# Patient Record
Sex: FEMALE | Race: WHITE | NOT HISPANIC OR LATINO | Employment: OTHER | URBAN - METROPOLITAN AREA
[De-identification: names, ages, dates, MRNs, and addresses within clinical notes are randomized per-mention and may not be internally consistent; named-entity substitution may affect disease eponyms.]

---

## 2017-02-28 ENCOUNTER — ALLSCRIPTS OFFICE VISIT (OUTPATIENT)
Dept: OTHER | Facility: OTHER | Age: 62
End: 2017-02-28

## 2017-02-28 LAB
FLUAV AG SPEC QL IA: NEGATIVE
INFLUENZA B AG (HISTORICAL): NEGATIVE

## 2017-06-09 ENCOUNTER — APPOINTMENT (OUTPATIENT)
Dept: LAB | Facility: CLINIC | Age: 62
End: 2017-06-09
Payer: COMMERCIAL

## 2017-06-09 ENCOUNTER — TRANSCRIBE ORDERS (OUTPATIENT)
Dept: LAB | Facility: CLINIC | Age: 62
End: 2017-06-09

## 2017-06-09 DIAGNOSIS — E78.5 OTHER AND UNSPECIFIED HYPERLIPIDEMIA: ICD-10-CM

## 2017-06-09 DIAGNOSIS — R53.83 FATIGUE, UNSPECIFIED TYPE: ICD-10-CM

## 2017-06-09 DIAGNOSIS — R73.01 IMPAIRED FASTING GLUCOSE: ICD-10-CM

## 2017-06-09 DIAGNOSIS — R53.83 FATIGUE, UNSPECIFIED TYPE: Primary | ICD-10-CM

## 2017-06-09 LAB
ALBUMIN SERPL BCP-MCNC: 3.7 G/DL (ref 3.5–5)
ALP SERPL-CCNC: 105 U/L (ref 46–116)
ALT SERPL W P-5'-P-CCNC: 35 U/L (ref 12–78)
ANION GAP SERPL CALCULATED.3IONS-SCNC: 7 MMOL/L (ref 4–13)
AST SERPL W P-5'-P-CCNC: 27 U/L (ref 5–45)
BILIRUB SERPL-MCNC: 0.38 MG/DL (ref 0.2–1)
BUN SERPL-MCNC: 20 MG/DL (ref 5–25)
CALCIUM SERPL-MCNC: 9.2 MG/DL (ref 8.3–10.1)
CHLORIDE SERPL-SCNC: 106 MMOL/L (ref 100–108)
CHOLEST SERPL-MCNC: 200 MG/DL (ref 50–200)
CO2 SERPL-SCNC: 27 MMOL/L (ref 21–32)
CREAT SERPL-MCNC: 0.61 MG/DL (ref 0.6–1.3)
EST. AVERAGE GLUCOSE BLD GHB EST-MCNC: 128 MG/DL
GFR SERPL CREATININE-BSD FRML MDRD: >60 ML/MIN/1.73SQ M
GLUCOSE P FAST SERPL-MCNC: 105 MG/DL (ref 65–99)
HBA1C MFR BLD: 6.1 % (ref 4.2–6.3)
HDLC SERPL-MCNC: 44 MG/DL (ref 40–60)
LDLC SERPL CALC-MCNC: 128 MG/DL (ref 0–100)
LDLC SERPL DIRECT ASSAY-MCNC: 130 MG/DL (ref 0–100)
POTASSIUM SERPL-SCNC: 4.1 MMOL/L (ref 3.5–5.3)
PROT SERPL-MCNC: 7.4 G/DL (ref 6.4–8.2)
SODIUM SERPL-SCNC: 140 MMOL/L (ref 136–145)
TRIGL SERPL-MCNC: 141 MG/DL
TSH SERPL DL<=0.05 MIU/L-ACNC: 1.27 UIU/ML (ref 0.36–3.74)

## 2017-06-09 PROCEDURE — 83721 ASSAY OF BLOOD LIPOPROTEIN: CPT

## 2017-06-09 PROCEDURE — 36415 COLL VENOUS BLD VENIPUNCTURE: CPT | Performed by: NURSE PRACTITIONER

## 2017-06-09 PROCEDURE — 84443 ASSAY THYROID STIM HORMONE: CPT

## 2017-06-09 PROCEDURE — 80053 COMPREHEN METABOLIC PANEL: CPT | Performed by: NURSE PRACTITIONER

## 2017-06-09 PROCEDURE — 83036 HEMOGLOBIN GLYCOSYLATED A1C: CPT | Performed by: NURSE PRACTITIONER

## 2017-06-09 PROCEDURE — 80061 LIPID PANEL: CPT | Performed by: NURSE PRACTITIONER

## 2017-06-12 ENCOUNTER — ALLSCRIPTS OFFICE VISIT (OUTPATIENT)
Dept: OTHER | Facility: OTHER | Age: 62
End: 2017-06-12

## 2017-06-12 ENCOUNTER — GENERIC CONVERSION - ENCOUNTER (OUTPATIENT)
Dept: OTHER | Facility: OTHER | Age: 62
End: 2017-06-12

## 2017-12-11 ENCOUNTER — GENERIC CONVERSION - ENCOUNTER (OUTPATIENT)
Dept: FAMILY MEDICINE CLINIC | Facility: CLINIC | Age: 62
End: 2017-12-11

## 2018-01-12 VITALS
TEMPERATURE: 99.4 F | HEIGHT: 60 IN | WEIGHT: 153 LBS | DIASTOLIC BLOOD PRESSURE: 70 MMHG | HEART RATE: 78 BPM | RESPIRATION RATE: 16 BRPM | BODY MASS INDEX: 30.04 KG/M2 | SYSTOLIC BLOOD PRESSURE: 110 MMHG

## 2018-01-13 VITALS
WEIGHT: 154.38 LBS | TEMPERATURE: 98.4 F | DIASTOLIC BLOOD PRESSURE: 60 MMHG | HEIGHT: 60 IN | HEART RATE: 80 BPM | BODY MASS INDEX: 30.31 KG/M2 | SYSTOLIC BLOOD PRESSURE: 110 MMHG | RESPIRATION RATE: 18 BRPM

## 2018-01-13 NOTE — PROGRESS NOTES
Assessment    1  Encounter for preventive health examination (V70 0) (Z00 00)   2  Body mass index (BMI) of 29 0 to 29 9 in adult (V85 25) (Z68 29)    Plan  Fatigue, Hyperlipidemia, Impaired fasting glucose    · (1) HEMOGLOBIN A1C; Status:Active; Requested for:95Dwk6556;    · (Q) COMPREHENSIVE METABOLIC PANEL W/O eGFR; Status:Active; Requested  for:82Mho2966;    · (Q) LIPID PANEL WITH DIRECT LDL; Status:Active; Requested for:57Grx4456;    · (Q) TSH, 3RD GENERATION W/REFLEX TO FT4; Status:Active; Requested  for:25Tfy0887; Health Maintenance, Encounter for screening mammogram for malignant neoplasm of  breast    · * MAMMO SCREENING BILATERAL W CAD; Status:Hold For - Scheduling; Requested  for:21Dec2016;     Discussion/Summary  health maintenance visit Currently, she eats an adequate diet and has an inadequate exercise regimen  the risks and benefits of cervical cancer screening were discussed Breast cancer screening: the risks and benefits of breast cancer screening were discussed, self breast exam technique was taught, monthly self breast exam was advised and mammogram has been ordered  Colorectal cancer screening: the risks and benefits of colorectal cancer screening were discussed and colorectal cancer screening is current  Osteoporosis screening: the risks and benefits of osteoporosis screening were discussed  Screening lab work includes glucose  The risks and benefits of immunizations were discussed and patient declines immunizations  Advice and education were given regarding nutrition, aerobic exercise, weight bearing exercise, weight loss, cardiovascular risk reduction, sunscreen use, self skin examination, helmet use and seat belt use  Complete labs and mammogram       Chief Complaint  patient presenting for her annual physical with pap sl/cma      History of Present Illness  HM, Adult Female: The patient is being seen for a health maintenance and gynecology evaluation   The last health maintenance visit was >1 year(s) ago  Social History: Work status: retired  The patient is a former cigarette smoker  She reports occasional alcohol use  She has never used illicit drugs  General Health: The patient's health since the last visit is described as good  She has regular dental visits  She denies vision problems  She denies hearing loss  Immunizations status: not up to date   pt declines flu vac  Lifestyle:  She has weight concerns  She does not exercise regularly  She does not use tobacco  She denies alcohol use  She denies drug use  Reproductive health: the patient is postmenopausal   she reports normal menses  she uses no contraception  she is not sexually active    Screening: cancer screening reviewed and updated  metabolic screening reviewed and updated  risk screening reviewed and updated  HPI: pt here for pe  Review of Systems    Constitutional: No fever, no chills, feels well, no tiredness, no recent weight gain or weight loss  Eyes: No complaints of eye pain, no red eyes, no eyesight problems, no discharge, no dry eyes, no itching of eyes  ENT: no complaints of earache, no loss of hearing, no nose bleeds, no nasal discharge, no sore throat, no hoarseness  Cardiovascular: No complaints of slow heart rate, no fast heart rate, no chest pain, no palpitations, no leg claudication, no lower extremity edema  Respiratory: No complaints of shortness of breath, no wheezing, no cough, no SOB on exertion, no orthopnea, no PND  Gastrointestinal: No complaints of abdominal pain, no constipation, no nausea or vomiting, no diarrhea, no bloody stools  Genitourinary: No complaints of dysuria, no incontinence, no pelvic pain, no dysmenorrhea, no vaginal discharge or bleeding  Musculoskeletal: No complaints of arthralgias, no myalgias, no joint swelling or stiffness, no limb pain or swelling     Integumentary: No complaints of skin rash or lesions, no itching, no skin wounds, no breast pain or lump  Neurological: No complaints of headache, no confusion, no convulsions, no numbness, no dizziness or fainting, no tingling, no limb weakness, no difficulty walking  Psychiatric: Not suicidal, no sleep disturbance, no anxiety or depression, no change in personality, no emotional problems  Endocrine: No complaints of proptosis, no hot flashes, no muscle weakness, no deepening of the voice, no feelings of weakness  Hematologic/Lymphatic: No complaints of swollen glands, no swollen glands in the neck, does not bleed easily, does not bruise easily  Active Problems    1  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)   2  Benign paroxysmal positional vertigo due to bilateral vestibular disorder (386 11)   (H81 13)   3  Cervicalgia (723 1) (M54 2)   4  Depression (311) (F32 9)   5  Dysuria (788 1) (R30 0)   6  Encounter for routine gynecological examination (V72 31) (Z01 419)   7  Encounter for screening colonoscopy (V76 51) (Z12 11)   8  Encounter for screening mammogram for malignant neoplasm of breast (V76 12)   (Z12 31)   9  Fatigue (780 79) (R53 83)   10  Genital herpes simplex (054 10) (A60 00)   11  Herpes simplex infection (054 9) (B00 9)   12  Herpes simplex virus (HSV) infection (054 9) (B00 9)   13  HPV test positive (796 9,079 4)   14  Hyperlipidemia (272 4) (E78 5)   15  Impaired fasting glucose (790 21) (R73 01)   16  Limb pain (729 5) (M79 609)   17  Menopause (627 2) (Z78 0)   18  Personal history of urinary infection (V13 02) (Z87 440)   19  Snoring (786 09) (R06 83)   20  Tick bite (919 4,E906 4) (W57 XXXA)   21  Urinary frequency (788 41) (R35 0)   22  Urinary tract infection (599 0) (N39 0)   23   Vitamin D deficiency (268 9) (E55 9)    Past Medical History    · Acute Cystitis (595 0)   · History of Acute right-sided low back pain with right-sided sciatica (724 2,724 3) (M54 41)   · Acute upper respiratory infection (465 9) (J06 9)   · Anxiety disorder (300 00) (F41 9)   · Chronic constipation (564 00) (K59 09)   · Elbow pain, unspecified laterality (719 42) (M25 529)   · History of Gait disturbance (781 2) (R26 9)   · History of Hematochezia (578 1) (K92 1)   · History of acute bronchitis (V12 69) (Z87 09)   · History of constipation (V12 79) (Z87 19)   · History of gastroesophageal reflux (GERD) (V12 79) (Z87 19)   · History of headache (V13 89) (L80 886)   · History of nausea (V12 79) (H02 290)   · History of shortness of breath (V13 89) (B02 692)   · History of shortness of breath (V13 89) (R46 061)   · Impacted cerumen, unspecified laterality (380 4) (H61 20)   · History of Otalgia, unspecified laterality (388 70) (H92 09)   · History of Primary localized osteoarthrosis of the hip, unspecified laterality (715 15)  (M16 10)   · Thoracic neuritis (724 4) (M54 14)   · Thoracic neuritis (724 4) (M54 14)   · History of Urinary Tract Infection (V13 02)    Family History  Mother    · Family history of Denial Of Any Significant Medical History  Father    · Family history of Coronary Artery Disease (V17 49)  Sister    · Family history of Type 2 Diabetes Mellitus    Social History    · Caffeine use (V49 89) (F15 90)   · Former smoker (V15 82) (Z72 715)   · Occasional alcohol use    Current Meds   1  Cyclobenzaprine HCl - 10 MG Oral Tablet; 1 tab PO at bedtime as needed; Therapy: 13ZER2628 to (Last Rx:17Uja9380)  Requested for: 59Nos9988 Ordered   2  Fish Oil 1200 MG Oral Capsule; TAKE 2 CAPSULE Twice daily; Therapy: 63Dpe9216 to (Evaluate:12Cec0507); Last Rx:08Lop1977 Ordered   3  Naproxen 500 MG Oral Tablet; TAKE 1 TABLET EVERY 12 HOURS WITH FOOD; Therapy: 60RSC1691 to (Evaluate:08Nkw2678)  Requested for: 76Gcy2904; Last   Rx:64Jsr0283 Ordered   4  Omeprazole 20 MG Oral Capsule Delayed Release; Therapy: (Recorded:39Yph0037) to Recorded   5  ValACYclovir HCl - 500 MG Oral Tablet; TAKE 1 TABLET TWICE DAILY; Therapy: 82Awo7211 to (Last Rx:18Oct2016)  Requested for: 18Oct2016 Ordered   6  Vitamin D-3 5000 UNIT TABS; one tab QOD with food; Therapy: 84Esq8553 to (Last Rx:45Dfi0953) Ordered    Allergies    1  CIPROFLOXACIN    Vitals   Recorded: 76Bhy3453 08:30AM   Temperature 97 8 F   Heart Rate 80   Respiration 16   Systolic 343   Diastolic 80   Height 4 ft 11 5 in   Weight 150 lb    BMI Calculated 29 79   BSA Calculated 1 64     Physical Exam    Constitutional   General appearance: No acute distress, well appearing and well nourished  Head and Face   Head and face: Normal     Palpation of the face and sinuses: No sinus tenderness  Eyes   Conjunctiva and lids: No swelling, erythema or discharge  Pupils and irises: Equal, round, reactive to light  Ears, Nose, Mouth, and Throat   External inspection of ears and nose: Normal     Otoscopic examination: Tympanic membranes translucent with normal light reflex  Canals patent without erythema  Hearing: Normal     Nasal mucosa, septum, and turbinates: Normal without edema or erythema  Lips, teeth, and gums: Normal, good dentition  Oropharynx: Normal with no erythema, edema, exudate or lesions  Neck   Neck: Supple, symmetric, trachea midline, no masses  Thyroid: Normal, no thyromegaly  Pulmonary   Respiratory effort: No increased work of breathing or signs of respiratory distress  Auscultation of lungs: Clear to auscultation  Cardiovascular   Auscultation of heart: Normal rate and rhythm, normal S1 and S2, no murmurs  Peripheral vascular exam: Normal     Examination of extremities for edema and/or varicosities: Normal     Chest   Breasts: Normal, no dimpling or skin changes appreciated  Palpation of breasts and axillae: Normal, no masses palpated  Chest: Normal     Abdomen   Abdomen: Non-tender, no masses  Liver and spleen: No hepatomegaly or splenomegaly  Examination for hernias: No hernia appreciated  deferred  Genitourinary deferred  Lymphatic   Palpation of lymph nodes in neck: No lymphadenopathy  Palpation of lymph nodes in axillae: No lymphadenopathy  Palpation of lymph nodes in groin: No lymphadenopathy  Musculoskeletal   Gait and station: Normal     Skin   Skin and subcutaneous tissue: Normal without rashes or lesions  Neurologic   Cranial nerves: Cranial nerves II-XII intact  Cortical function: Normal mental status  Reflexes: 2+ and symmetric  Sensation: No sensory loss  Coordination: Normal finger to nose and heel to shin  Psychiatric   Orientation to person, place, and time: Normal     Mood and affect: Normal        Results/Data  PHQ-2 Adult Depression Screening 91Ixh8009 08:47AM User, [facility]     Test Name Result Flag Reference   PHQ-2 Adult Depression Score 0     Over the last two weeks, how often have you been bothered by any of the following problems? Little interest or pleasure in doing things: Not at all - 0  Feeling down, depressed, or hopeless: Not at all - 0   PHQ-2 Adult Depression Screening Negative         Health Management  Encounter for screening mammogram for malignant neoplasm of breast   Digital Bilateral Screening Mammogram With CAD; every 1 year; Last 38EVJ8771; Next  Due: 71Blu2308; Overdue  History of Need for prophylactic vaccination and inoculation against influenza   Influenza; every 1 year; Next Due: 64Lvo5727;  Overdue    Signatures   Electronically signed by : Bedford Lombard; Dec 21 2016  9:08AM EST                       (Author)    Electronically signed by : JESSICA Villegas ; Dec 21 2016 10:42AM EST                       (Author)

## 2018-01-15 NOTE — RESULT NOTES
Verified Results  (1) HEMOGLOBIN A1C 03WUV4887 09:26AM Roxanna Baeza     Test Name Result Flag Reference   HEMOGLOBIN A1C 6 1 %  4 2-6 3   EST  AVG  GLUCOSE 128 mg/dl       (1) LDL,DIRECT 88IUN2655 09:26AM Roxanna Baeza     Test Name Result Flag Reference   LDL, DIRECT 130 mg/dl H 0-100   This is a fasting blood test  Water,black tea or black  coffee only after 9:00pm the night before test  Drink 2 glasses of water the morning of test         LDL Cholesterol:        Optimal          <100 mg/dl        Near Optimal     100-129 mg/dl        Above Optimal          Borderline High   130-159 mg/dl          High              160-189 mg/dl          Very High        >189 mg/dl     (1) COMPREHENSIVE METABOLIC PANEL 81CUT9560 23:07EC Roxanna Baeza     Test Name Result Flag Reference   SODIUM 140 mmol/L  136-145   POTASSIUM 4 1 mmol/L  3 5-5 3   CHLORIDE 106 mmol/L  100-108   CARBON DIOXIDE 27 mmol/L  21-32   ANION GAP (CALC) 7 mmol/L  4-13   BLOOD UREA NITROGEN 20 mg/dL  5-25   CREATININE 0 61 mg/dL  0 60-1 30   Standardized to IDMS reference method   CALCIUM 9 2 mg/dL  8 3-10 1   BILI, TOTAL 0 38 mg/dL  0 20-1 00   ALK PHOSPHATAS 105 U/L     ALT (SGPT) 35 U/L  12-78   AST(SGOT) 27 U/L  5-45   ALBUMIN 3 7 g/dL  3 5-5 0   TOTAL PROTEIN 7 4 g/dL  6 4-8 2   eGFR Non-African American      >60 0 ml/min/1 73sq [facility] Disease Education Program recommendations are as follows:  GFR calculation is accurate only with a steady state creatinine  Chronic Kidney disease less than 60 ml/min/1 73 sq  meters  Kidney failure less than 15 ml/min/1 73 sq  meters  GLUCOSE FASTING 105 mg/dL H 65-99     (1) LIPID PANEL, FASTING 13ITD9231 09:26AM Roxanna Baeza     Test Name Result Flag Reference   CHOLESTEROL 200 mg/dL     HDL,DIRECT 44 mg/dL  40-60   Specimen collection should occur prior to Metamizole administration due to the potential for falsely depressed results     LDL CHOLESTEROL CALCULATED 128 mg/dL H 0-100 Triglyceride:         Normal              <150 mg/dl       Borderline High    150-199 mg/dl       High               200-499 mg/dl       Very High          >499 mg/dl  Cholesterol:         Desirable        <200 mg/dl      Borderline High  200-239 mg/dl      High             >239 mg/dl  HDL Cholesterol:        High    >59 mg/dL      Low     <41 mg/dL  LDL CALCULATED:    This screening LDL is a calculated result  It does not have the accuracy of the Direct Measured LDL in the monitoring of patients with hyperlipidemia and/or statin therapy  Direct Measure LDL (VVC335) must be ordered separately in these patients  TRIGLYCERIDES 141 mg/dL  <=150   Specimen collection should occur prior to N-Acetylcysteine or Metamizole administration due to the potential for falsely depressed results  (1) TSH WITH FT4 REFLEX 00LWI4814 09:26AM Channie Loss     Test Name Result Flag Reference   TSH 1 270 uIU/mL  0 358-3 740   Patients undergoing fluorescein dye angiography may retain small amounts of fluorescein in the body for 48-72 hours post procedure  Samples containing fluorescein can produce falsely depressed TSH values  If the patient had this procedure,a specimen should be resubmitted post fluorescein clearance            The recommended reference ranges for TSH during pregnancy are as follows:  First trimester 0 1 to 2 5 uIU/mL  Second trimester  0 2 to 3 0 uIU/mL  Third trimester 0 3 to 3 0 uIU/m

## 2018-08-28 ENCOUNTER — OFFICE VISIT (OUTPATIENT)
Dept: FAMILY MEDICINE CLINIC | Facility: CLINIC | Age: 63
End: 2018-08-28
Payer: COMMERCIAL

## 2018-08-28 VITALS
BODY MASS INDEX: 29.99 KG/M2 | HEART RATE: 66 BPM | TEMPERATURE: 98.1 F | RESPIRATION RATE: 12 BRPM | DIASTOLIC BLOOD PRESSURE: 70 MMHG | WEIGHT: 151 LBS | SYSTOLIC BLOOD PRESSURE: 112 MMHG
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
## 2018-08-28 DIAGNOSIS — Z12.31 SCREENING MAMMOGRAM, ENCOUNTER FOR: ICD-10-CM

## 2018-08-28 PROCEDURE — 99213 OFFICE O/P EST LOW 20 MIN: CPT | Performed by: FAMILY MEDICINE

## 2018-08-28 RX ORDER — AMOXICILLIN 500 MG
2 CAPSULE ORAL 2 TIMES DAILY
COMMUNITY
Start: 2015-09-17 | End: 2018-11-28

## 2018-08-28 RX ORDER — MAG HYDROX/ALUMINUM HYD/SIMETH 400-400-40
SUSPENSION, ORAL (FINAL DOSE FORM) ORAL DAILY
COMMUNITY
Start: 2015-09-17 | End: 2018-11-28

## 2018-08-28 NOTE — PROGRESS NOTES
Kristy Quiroga 1955 female MRN: [id number]    FAMILY PRACTICE ACUTE OFFICE VISIT  [facility] - [address]      ASSESSMENT/PLAN  Kristy Quiroga is a 66 y o  female presents to the office for     1  Bug bite:  -at this time neuro and physical exam was within normal limits  -no signs actual bite at this time   -continue hydrocortisone as needed for itchiness  -if the symptoms continue to worsen to please come back to the office for further testing    2  Screening mammogram script given today    Disposition:  In 1 week for health maintenance the PCP    No future appointments  SUBJECTIVE  CC: Insect Bite (rt  lower leg happened this morning)      HPI:  Kristy Quiroga is a 66 y o  female who presents as an appointment for an acute  At 9:00 a m  on 08/28/2008 today patient states that she was in the [place] and felt something stick her on her right lateral leg  Patient denied seeing anything physically on her skin but states that she had swelling that was visible to her and her daughter-in-law  She noted no radiated pain  But had numbness and tingling in that area  Currently she now has pruritus the area and has not used anything topically  She states that the symptoms have been improving by the hour without any over-the-counter medications  Will need her screening mammogram script today    Review of Systems   Constitutional: Negative for activity change, appetite change, chills, fatigue and fever  HENT: Negative for congestion  Eyes: Negative for visual disturbance  Respiratory: Negative for cough, chest tightness and shortness of breath  Cardiovascular: Negative for chest pain and leg swelling  Gastrointestinal: Negative for abdominal distention, abdominal pain, constipation, diarrhea, nausea and vomiting  Skin:        Right leg itchiness     Allergic/Immunologic: Negative for environmental allergies     Neurological: Negative for dizziness, light-headedness and headaches  All other systems reviewed and are negative  Historical Information   The patient history was reviewed as follows:  Past Medical History:   Diagnosis Date    GERD (gastroesophageal reflux disease)          Past Surgical History:   Procedure Laterality Date     SECTION      CHOLECYSTECTOMY       History reviewed  No pertinent family history  Social History   History   Alcohol Use    Yes     Comment: occ wine     History   Drug Use No     History   Smoking Status    Former Smoker   Smokeless Tobacco    Never Used       Medications:     Current Outpatient Prescriptions:     Cholecalciferol (VITAMIN D3) 5000 units CAPS, Take by mouth daily  , Disp: , Rfl:     Omega-3 Fatty Acids (FISH OIL) 1200 MG CAPS, Take 2 capsules by mouth 2 (two) times a day, Disp: , Rfl:     omeprazole (PriLOSEC) 20 mg delayed release capsule, Take 20 mg by mouth daily, Disp: , Rfl:     ondansetron (ZOFRAN) 4 mg tablet, Take 1 tablet by mouth every 6 (six) hours for 3 days, Disp: 9 tablet, Rfl: 0    polyethylene glycol (MIRALAX) 17 g packet, Take 17 g by mouth daily for 7 days, Disp: 119 g, Rfl: 0    Allergies   Allergen Reactions    Ciprofloxacin Vomiting     Reaction Date: ;        OBJECTIVE  Vitals:   Vitals:    18 1025   BP: 112/70   BP Location: Left arm   Patient Position: Sitting   Cuff Size: Standard   Pulse: 66   Resp: 12   Temp: 98 1 °F (36 7 °C)   TempSrc: Tympanic   Weight: 68 5 kg (151 lb)         Physical Exam   Constitutional: She is oriented to person, place, and time  Vital signs are normal  She appears well-developed and well-nourished  HENT:   Head: Normocephalic and atraumatic  Right Ear: Hearing normal    Left Ear: Hearing normal    Eyes: Conjunctivae and EOM are normal  Pupils are equal, round, and reactive to light  Neck: Normal range of motion  Neck supple     Cardiovascular: Normal rate, regular rhythm, S1 normal, S2 normal, normal heart sounds and intact distal pulses  No murmur heard  Pulmonary/Chest: Effort normal and breath sounds normal  No respiratory distress  She has no wheezes  Abdominal: Soft  Bowel sounds are normal  She exhibits no distension  There is no tenderness  Musculoskeletal: Normal range of motion  She exhibits no edema  Neurological: She is alert and oriented to person, place, and time  She has normal strength  Skin: Skin is warm  No rash noted  Normal exam over the right shin; microfilament/sensation within normal limits   Psychiatric: She has a normal mood and affect  Her speech is normal and behavior is normal  Judgment and thought content normal    Vitals reviewed                   Abraham Reyes MD,   [facility]  8/28/2018

## 2018-10-15 ENCOUNTER — OFFICE VISIT (OUTPATIENT)
Dept: FAMILY MEDICINE CLINIC | Facility: CLINIC | Age: 63
End: 2018-10-15
Payer: COMMERCIAL

## 2018-10-15 VITALS
HEIGHT: 60 IN | WEIGHT: 152 LBS | HEART RATE: 70 BPM | TEMPERATURE: 97.4 F | SYSTOLIC BLOOD PRESSURE: 130 MMHG | BODY MASS INDEX: 29.84 KG/M2 | RESPIRATION RATE: 16 BRPM | DIASTOLIC BLOOD PRESSURE: 76 MMHG

## 2018-10-15 DIAGNOSIS — J06.9 UPPER RESPIRATORY TRACT INFECTION, UNSPECIFIED TYPE: Primary | ICD-10-CM

## 2018-10-15 DIAGNOSIS — R10.9 ABDOMINAL PAIN, UNSPECIFIED ABDOMINAL LOCATION: ICD-10-CM

## 2018-10-15 PROCEDURE — 99213 OFFICE O/P EST LOW 20 MIN: CPT | Performed by: FAMILY MEDICINE

## 2018-10-15 PROCEDURE — 3008F BODY MASS INDEX DOCD: CPT | Performed by: FAMILY MEDICINE

## 2018-10-15 RX ORDER — AZITHROMYCIN 250 MG/1
TABLET, FILM COATED ORAL
Qty: 6 TABLET | Refills: 0 | Status: SHIPPED | OUTPATIENT
Start: 2018-10-15 | End: 2018-10-19

## 2018-10-15 NOTE — PROGRESS NOTES
Michael Patton 1955 female MRN: [id number]    FAMILY PRACTICE ACUTE OFFICE VISIT  [facility] - [address]      ASSESSMENT/PLAN  Michael Patton is a 66 y o  female presents to the office for    Upper respiratory tract infection w/ abdominal pain  - Likely viral in nature; however upcoming appointment for dental procedure  Explained to the patient that a viral illness/ mixed with GI symptoms is going around  If her symptoms don't improve to continue OTC supportive care  Encourage the patient to continue supportive care  For fevers > 100 4 please use Tylenol/ Ibuprofen  If your symptoms worsen please contact our office for a sooner appointment  - Abdominal pain has resolved, however can present again  -     azithromycin (ZITHROMAX) 250 mg tablet; Take 2 tablets today, then 1 tablet for 4 days  Disposition:  Return to the office in 1 week if symptoms worsen    No future appointments  SUBJECTIVE  CC: Nausea (had stomach pain )      HPI:  Michael Patton is a 66 y o  female who presents for who presents to the office for abdominal pain that started Friday and has already resolved  She states that it was epigastric pain/and N  with no acid reflux  The patient now has developed URI symptoms since Saturday with cough, congestion  Patient is an upcoming dental appointment and is concerned being placed on antibiotics given that she is unsure if it will mix with anesthesia  The patient understands that she is unable to per the surgery if her cough persists  The patient has requested that she only see Krys Alberts her main provider at next visit    Review of Systems   Constitutional: Negative for activity change, appetite change, chills, fatigue and fever  HENT: Positive for congestion  Respiratory: Positive for cough  Gastrointestinal: Positive for abdominal pain and nausea  Negative for diarrhea  Genitourinary: Negative          Historical Information   The patient history was reviewed as follows:  Past Medical History:   Diagnosis Date    Anxiety disorder 2009    last assessed 09    Chronic constipation 2004    last assessed 04    Gait disturbance 2012    last assessed 12    GERD (gastroesophageal reflux disease) 2007    last assessed 3/23/07    Primary localized osteoarthritis of hip 10/27/2011    last assessed 10/27/11    Thoracic neuritis 2007    last assessed 07         Past Surgical History:   Procedure Laterality Date     SECTION      CHOLECYSTECTOMY       Family History   Problem Relation Age of Onset    Coronary artery disease Father     Diabetes type II Sister       Social History   History   Alcohol Use    Yes     Comment: occ wine / occasional      History   Drug Use No     History   Smoking Status    Former Smoker   Smokeless Tobacco    Never Used       Medications:     Current Outpatient Prescriptions:     Cholecalciferol (VITAMIN D3) 5000 units CAPS, Take by mouth daily  , Disp: , Rfl:     Omega-3 Fatty Acids (FISH OIL) 1200 MG CAPS, Take 2 capsules by mouth 2 (two) times a day, Disp: , Rfl:     omeprazole (PriLOSEC) 20 mg delayed release capsule, Take 20 mg by mouth daily, Disp: , Rfl:     Allergies   Allergen Reactions    Ciprofloxacin Vomiting     Reaction Date: 2010;        OBJECTIVE  Vitals:   Vitals:    10/15/18 0925   BP: 130/76   BP Location: Left arm   Patient Position: Sitting   Cuff Size: Standard   Pulse: 70   Resp: 16   Temp: (!) 97 4 °F (36 3 °C)   Weight: 68 9 kg (152 lb)   Height: 5' (1 524 m)         Physical Exam   Constitutional: She is oriented to person, place, and time  Vital signs are normal  She appears well-developed and well-nourished  HENT:   Head: Normocephalic and atraumatic     Right Ear: Hearing and external ear normal    Left Ear: Hearing and external ear normal    Nose: Nose normal    Mouth/Throat: Uvula is midline and mucous membranes are normal  Posterior oropharyngeal erythema present  Eyes: Pupils are equal, round, and reactive to light  Conjunctivae and EOM are normal    Neck: Normal range of motion  Neck supple  Cardiovascular: Normal rate, regular rhythm, S1 normal, S2 normal, normal heart sounds and intact distal pulses  No murmur heard  Pulmonary/Chest: Effort normal  No respiratory distress  She has no wheezes  She has rhonchi in the right upper field, the right lower field, the left upper field and the left lower field  Abdominal: Soft  Bowel sounds are normal  She exhibits no distension  There is no tenderness  Musculoskeletal: Normal range of motion  She exhibits no edema  Neurological: She is alert and oriented to person, place, and time  She has normal strength  Skin: Skin is warm  No rash noted  Psychiatric: She has a normal mood and affect  Her speech is normal and behavior is normal  Judgment and thought content normal    Vitals reviewed                   Lianna Berkowitz MD,   [facility]  10/15/2018

## 2018-11-28 ENCOUNTER — HOSPITAL ENCOUNTER (EMERGENCY)
Facility: HOSPITAL | Age: 63
Discharge: HOME | End: 2018-11-28
Attending: EMERGENCY MEDICINE
Payer: COMMERCIAL

## 2018-11-28 ENCOUNTER — APPOINTMENT (EMERGENCY)
Dept: RADIOLOGY | Facility: HOSPITAL | Age: 63
End: 2018-11-28
Payer: COMMERCIAL

## 2018-11-28 VITALS
TEMPERATURE: 97.3 F | HEART RATE: 84 BPM | DIASTOLIC BLOOD PRESSURE: 75 MMHG | RESPIRATION RATE: 20 BRPM | OXYGEN SATURATION: 96 % | SYSTOLIC BLOOD PRESSURE: 136 MMHG

## 2018-11-28 DIAGNOSIS — M25.569 KNEE PAIN: Primary | ICD-10-CM

## 2018-11-28 DIAGNOSIS — S76.912A MUSCLE STRAIN OF LEFT THIGH, INITIAL ENCOUNTER: ICD-10-CM

## 2018-11-28 PROCEDURE — 73564 X-RAY EXAM KNEE 4 OR MORE: CPT

## 2018-11-28 PROCEDURE — 73552 X-RAY EXAM OF FEMUR 2/>: CPT

## 2018-11-28 PROCEDURE — 99283 EMERGENCY DEPT VISIT LOW MDM: CPT

## 2018-11-28 RX ORDER — AMOXICILLIN 500 MG/1
500 TABLET, FILM COATED ORAL 3 TIMES DAILY
COMMUNITY
End: 2019-11-21

## 2018-11-28 RX ORDER — GINSENG 100 MG
1 CAPSULE ORAL ONCE
Status: COMPLETED | OUTPATIENT
Start: 2018-11-28 | End: 2018-11-28

## 2018-11-28 RX ADMIN — BACITRACIN ZINC 1 SMALL APPLICATION: 500 OINTMENT TOPICAL at 12:41

## 2018-11-28 NOTE — DISCHARGE INSTRUCTIONS

## 2018-11-28 NOTE — ED PROVIDER NOTES
History  Chief Complaint   Patient presents with    Fall     fell about 8;30 am on the ice     c/o L knee and upper leg pain     32-year-old female presents with left knee and thigh pain x3 days  She slipped on ice while walking her dog  She fell on her L knee, it is bruised, mildly swollen with radiation to her thigh  She did not take anything for the pain  She denies any chest pain, shortness of breath, difficulty breathing before or after the fall  She did not lose consciousness  She did not hit her head  She is not on blood thinners  She has pain with ambulation  She was able to walk with a cane  She has 2 abrasions on her left knee  She denies any fever, chills, chest pain, shortness of breath, difficulty breathing, headache  Prior to Admission Medications   Prescriptions Last Dose Informant Patient Reported? Taking?   amoxicillin (AMOXIL) 500 MG tablet   Yes Yes   Sig: Take 500 mg by mouth 3 (three) times a day   omeprazole (PriLOSEC) 20 mg delayed release capsule   Yes No   Sig: Take 20 mg by mouth daily      Facility-Administered Medications: None       Past Medical History:   Diagnosis Date    Anxiety disorder 2009    last assessed 09    Chronic constipation 2004    last assessed 04    Gait disturbance 2012    last assessed 12    GERD (gastroesophageal reflux disease) 2007    last assessed 3/23/07    Primary localized osteoarthritis of hip 10/27/2011    last assessed 10/27/11    Thoracic neuritis 2007    last assessed 07       Past Surgical History:   Procedure Laterality Date     SECTION      CHOLECYSTECTOMY      DENTAL SURGERY         Family History   Problem Relation Age of Onset    Coronary artery disease Father     Diabetes type II Sister      I have reviewed and agree with the history as documented      Social History   Substance Use Topics    Smoking status: Former Smoker    Smokeless tobacco: Never Used    Alcohol use Yes      Comment: occ wine / occasional         Review of Systems   Constitutional: Negative for chills and fever  HENT: Negative for sneezing and sore throat  Eyes: Negative for photophobia and visual disturbance  Respiratory: Negative for cough and shortness of breath  Cardiovascular: Negative for chest pain, palpitations and leg swelling  Gastrointestinal: Negative for abdominal pain, constipation, diarrhea, nausea and vomiting  Musculoskeletal: Positive for arthralgias and myalgias  Negative for back pain, gait problem, joint swelling, neck pain and neck stiffness  Skin: Negative for color change, pallor, rash and wound  Neurological: Negative for dizziness, syncope, weakness, light-headedness, numbness and headaches  Psychiatric/Behavioral: Negative for agitation  All other systems reviewed and are negative  Physical Exam  Physical Exam   Constitutional: She appears well-developed and well-nourished  No distress  HENT:   Head: Normocephalic and atraumatic  Nose: Nose normal    Eyes: EOM are normal    Neck: Normal range of motion  Neck supple  Cardiovascular: Normal rate, regular rhythm, normal heart sounds and intact distal pulses  Exam reveals no gallop and no friction rub  No murmur heard  Pulmonary/Chest: Effort normal and breath sounds normal  No respiratory distress  She has no wheezes  She has no rales  Sp02 is 96% indicating adequate oxygenation on room air   Musculoskeletal: She exhibits tenderness  Legs:  Lymphadenopathy:     She has no cervical adenopathy  Skin: Skin is warm and dry  Capillary refill takes less than 2 seconds  No rash noted  She is not diaphoretic  No erythema  No pallor  Nursing note and vitals reviewed        Vital Signs  ED Triage Vitals [11/28/18 1119]   Temperature Pulse Respirations Blood Pressure SpO2   (!) 97 3 °F (36 3 °C) 84 20 136/75 96 %      Temp Source Heart Rate Source Patient Position - Orthostatic VS BP Location FiO2 (%)   Tympanic Monitor Sitting Right arm --      Pain Score       3           Vitals:    11/28/18 1119   BP: 136/75   Pulse: 84   Patient Position - Orthostatic VS: Sitting       Visual Acuity      ED Medications  Medications   bacitracin topical ointment 1 small application (1 small application Topical Given 11/28/18 1241)       Diagnostic Studies  Results Reviewed     None                 XR knee 4+ vw left injury   Final Result by Len Hudson MD (11/28 1231)      No acute osseous abnormality  Workstation performed: [id number]         XR femur 2 views LEFT   Final Result by Len Hudson MD (11/28 1230)      No acute osseous abnormality  Workstation performed: [id number]                    Procedures  Procedures       Phone Contacts  ED Phone Contact    ED Course                               MDM  Number of Diagnoses or Management Options  Knee pain:   Muscle strain of left thigh, initial encounter:   Diagnosis management comments: xrays no acute osseous deformity  Likely muscular strain, advised to take tylenol/ibuprofen as needed for pain  Gave patient proper education regarding diagnosis  Answered all questions  Return to ED for any worsening of symptoms otherwise follow up with primary care physician for re-evaluation  Discussed plan with patient who verbalized understanding and agreed to plan         Amount and/or Complexity of Data Reviewed  Tests in the radiology section of CPT®: ordered and reviewed  Discussion of test results with the performing providers: yes  Review and summarize past medical records: yes  Discuss the patient with other providers: yes  Independent visualization of images, tracings, or specimens: yes      CritCare Time    Disposition  Final diagnoses:   Knee pain   Muscle strain of left thigh, initial encounter     Time reflects when diagnosis was documented in both MDM as applicable and the Disposition within this note     Time User Action Codes Description Comment    11/28/2018 12:34 PM Brittany Knight Add [W36 [id number]] Knee pain     11/28/2018 12:34 PM Brittany Knight Add [W42 [id number]] Muscle strain of left thigh, initial encounter       ED Disposition     ED Disposition Condition Comment    Discharge  [address] discharge to home/self care  Condition at discharge: Good        Follow-up Information     Follow up With Specialties Details Why Contact Info Additional [address] [facility], Nurse Practitioner Schedule an appointment as soon as possible for a visit in 3 days If symptoms worsen [facility] [address]  [address] [id number] [address] [facility] Emergency Department Emergency Medicine Go to As needed [address] [facility] ED, [place], [place], [address]          Discharge Medication List as of 11/28/2018 12:36 PM      CONTINUE these medications which have NOT CHANGED    Details   amoxicillin (AMOXIL) 500 MG tablet Take 500 mg by mouth 3 (three) times a day, Historical Med      omeprazole (PriLOSEC) 20 mg delayed release capsule Take 20 mg by mouth daily, Until Discontinued, Historical Med           No discharge procedures on file      ED Provider  Electronically Signed by           Dennis Rutherford PA-C  11/28/18 6266

## 2018-11-28 NOTE — ED NOTES
Pt reports slipping on black ice and falling today while walking the dog  Pt reports pain to mid-thighon left side with movement and walking  Pt noted with two abraisions to rught knee  Area cleansed with saline and treated with bacitracin and covered with bandage  No bruising, redness or swelling noted to left thigh         Rosmery Torres RN  11/28/18 Art Manjarrez RN  11/28/18 4024

## 2019-10-03 ENCOUNTER — OFFICE VISIT (OUTPATIENT)
Dept: FAMILY MEDICINE CLINIC | Facility: CLINIC | Age: 64
End: 2019-10-03
Payer: COMMERCIAL

## 2019-10-03 VITALS
BODY MASS INDEX: 34.71 KG/M2 | WEIGHT: 150 LBS | HEIGHT: 55 IN | TEMPERATURE: 98.2 F | SYSTOLIC BLOOD PRESSURE: 128 MMHG | HEART RATE: 60 BPM | DIASTOLIC BLOOD PRESSURE: 82 MMHG

## 2019-10-03 DIAGNOSIS — S32.019A CLOSED FRACTURE OF FIRST LUMBAR VERTEBRA, UNSPECIFIED FRACTURE MORPHOLOGY, INITIAL ENCOUNTER (HCC): Primary | ICD-10-CM

## 2019-10-03 PROCEDURE — 99213 OFFICE O/P EST LOW 20 MIN: CPT | Performed by: FAMILY MEDICINE

## 2019-10-07 ENCOUNTER — HOSPITAL ENCOUNTER (OUTPATIENT)
Dept: RADIOLOGY | Facility: HOSPITAL | Age: 64
Discharge: HOME | End: 2019-10-07
Attending: ORTHOPAEDIC SURGERY
Payer: COMMERCIAL

## 2019-10-07 ENCOUNTER — OFFICE VISIT (OUTPATIENT)
Dept: OBGYN CLINIC | Facility: HOSPITAL | Age: 64
End: 2019-10-07
Payer: COMMERCIAL

## 2019-10-07 VITALS
HEIGHT: 60 IN | WEIGHT: 150 LBS | HEART RATE: 65 BPM | BODY MASS INDEX: 29.45 KG/M2 | SYSTOLIC BLOOD PRESSURE: 114 MMHG | DIASTOLIC BLOOD PRESSURE: 74 MMHG

## 2019-10-07 DIAGNOSIS — S32.019A CLOSED FRACTURE OF FIRST LUMBAR VERTEBRA, UNSPECIFIED FRACTURE MORPHOLOGY, INITIAL ENCOUNTER (HCC): ICD-10-CM

## 2019-10-07 DIAGNOSIS — M46.1 SACROILIITIS (HCC): Primary | ICD-10-CM

## 2019-10-07 PROCEDURE — 72100 X-RAY EXAM L-S SPINE 2/3 VWS: CPT

## 2019-10-07 PROCEDURE — 99203 OFFICE O/P NEW LOW 30 MIN: CPT | Performed by: ORTHOPAEDIC SURGERY

## 2019-10-07 RX ORDER — METHYLPREDNISOLONE 4 MG/1
TABLET ORAL
Qty: 21 TABLET | Refills: 0 | Status: SHIPPED | OUTPATIENT
Start: 2019-10-07 | End: 2019-11-21

## 2019-10-07 NOTE — PROGRESS NOTES
Assessment/Plan:     Diagnoses and all orders for this visit:    Closed fracture of first lumbar vertebra, unspecified fracture morphology, initial encounter [facility])  -     Ambulatory referral to Orthopedic Surgery; Future    Advised analgesic with food  Non-weight bearing  Advised can give physical therapy but patient would like to see orthopedic first  Precautions reviewed  Subjective:      Patient ID: Caprice Taylor is a 60 y o  female  61 y/o female presents for referral after having a fall and tripped with her socks on  Patient was seen in the ER 10/1 and found to have L1 closed fracture  Patient states she has pain with prolong standing and pain is worse on the lower left side  Has tried OTC analgesic with no relief  She denies any saddle anesthesia or loss of bowel/bladder  Denies any numbness or tingling down the legs  The following portions of the patient's history were reviewed and updated as appropriate: allergies, current medications, past family history, past medical history, past social history, past surgical history and problem list     Review of Systems   Constitutional: Negative for appetite change and fever  HENT: Negative for ear pain and sore throat  Eyes: Negative for visual disturbance  Respiratory: Negative for shortness of breath and wheezing  Cardiovascular: Negative for chest pain and leg swelling  Gastrointestinal: Negative for abdominal pain, diarrhea, nausea and vomiting  Musculoskeletal: Positive for back pain and gait problem  Negative for arthralgias, neck pain and neck stiffness  Skin: Negative for color change  Neurological: Negative for dizziness, tremors, light-headedness and headaches  Psychiatric/Behavioral: Negative for agitation and behavioral problems           Objective:      /82 (BP Location: Left arm, Patient Position: Sitting, Cuff Size: Standard)   Pulse 60   Temp 98 2 °F (36 8 °C) (Tympanic)   Ht (!) 5" (0 127 m)   Wt 68 kg (150 lb)   BMI 4218 46 kg/m²          Physical Exam   Constitutional: She is oriented to person, place, and time  She appears well-developed and well-nourished  No distress  HENT:   Head: Normocephalic and atraumatic  Nose: Nose normal    Eyes: EOM are normal  Right eye exhibits no discharge  Left eye exhibits no discharge  Cardiovascular: Normal rate, regular rhythm, normal heart sounds and intact distal pulses  No murmur heard  Pulmonary/Chest: Effort normal and breath sounds normal  No respiratory distress  Abdominal: Soft  Bowel sounds are normal  She exhibits no distension  There is no tenderness  Musculoskeletal: Normal range of motion  She exhibits no edema  Left Lumbosacral pain and midsacral tenderness  Sensation intact   +2 pulses    Neurological: She is alert and oriented to person, place, and time  Skin: Skin is warm  No rash noted  Psychiatric: She has a normal mood and affect   Her behavior is normal

## 2019-10-07 NOTE — PROGRESS NOTES
64 y o female presenting for evaluation low back pain after a slip and fall down stairs earlier this week  She was seen in an emergency department recommended to follow up with a spine surgeon  Today she complains of low back pain that is worse on the lower left side of her back  It is made worse when rising from a low seated chair and prolonged standing  Pain is relieved by rest and taking in oral anti-inflammatories  Denies any numbness tingling or weakness in her extremities  Review of Systems  Review of systems negative unless otherwise specified in HPI    Past Medical History  Past Medical History:   Diagnosis Date    Anxiety disorder 2009    last assessed 09    Chronic constipation 2004    last assessed 04    Gait disturbance 2012    last assessed 12    GERD (gastroesophageal reflux disease) 2007    last assessed 3/23/07    Primary localized osteoarthritis of hip 10/27/2011    last assessed 10/27/11    Thoracic neuritis 2007    last assessed 07       Past Surgical History  Past Surgical History:   Procedure Laterality Date     SECTION      CHOLECYSTECTOMY      DENTAL SURGERY         Current Medications  Current Outpatient Medications on File Prior to Visit   Medication Sig Dispense Refill    amoxicillin (AMOXIL) 500 MG tablet Take 500 mg by mouth 3 (three) times a day      omeprazole (PriLOSEC) 20 mg delayed release capsule Take 20 mg by mouth daily       No current facility-administered medications on file prior to visit          Recent Labs [facility])  0   Lab Value Date/Time    HCT 40 6 2016 0026    HGB 13 5 2016 0026    WBC 12 00 (H) 2016 0026    HGBA1C 6 1 2017 0926    HGBA1C 6 2 (H) 09/10/2015 1101         Physical exam  · General: Awake, Alert, Oriented  · Eyes: Pupils equal, round and reactive to light  · Heart: regular rate and rhythm  · Lungs: No audible wheezing  · Abdomen: soft  Back exam  · Skin intact  · Tender over left lumbosacral area  Midline tenderness  · 5/5 strength in L3-S1 distribution   · Sensation intact L3-S1  · Equal reflexes bilaterally  · Intact distal pulses      Imaging  Images were personally reviewed with the patient    Two views of the lumbar spine shows a left transverse process fracture at L1    Assessment/Plan:   60 y  o female with a left transverse process fracture    · Weightbearing as tolerated, activities as tolerated  · Non operative management  · Medrol Dosepak  · Physical therapy to begin in 1 2 weeks  · Follow-up as needed

## 2019-11-16 ENCOUNTER — APPOINTMENT (EMERGENCY)
Dept: CT IMAGING | Facility: HOSPITAL | Age: 64
End: 2019-11-16
Payer: COMMERCIAL

## 2019-11-16 ENCOUNTER — HOSPITAL ENCOUNTER (EMERGENCY)
Facility: HOSPITAL | Age: 64
Discharge: HOME | End: 2019-11-16
Attending: EMERGENCY MEDICINE | Admitting: EMERGENCY MEDICINE
Payer: COMMERCIAL

## 2019-11-16 VITALS
SYSTOLIC BLOOD PRESSURE: 193 MMHG | BODY MASS INDEX: 30.4 KG/M2 | DIASTOLIC BLOOD PRESSURE: 77 MMHG | RESPIRATION RATE: 18 BRPM | HEART RATE: 75 BPM | TEMPERATURE: 97.5 F | WEIGHT: 155.65 LBS | OXYGEN SATURATION: 97 %

## 2019-11-16 DIAGNOSIS — R20.0 NUMBNESS AND TINGLING IN LEFT ARM: ICD-10-CM

## 2019-11-16 DIAGNOSIS — R51.9 HEADACHE: Primary | ICD-10-CM

## 2019-11-16 DIAGNOSIS — R20.2 NUMBNESS AND TINGLING IN LEFT ARM: ICD-10-CM

## 2019-11-16 LAB
ANION GAP SERPL CALCULATED.3IONS-SCNC: 11 MMOL/L (ref 4–13)
BASOPHILS # BLD AUTO: 0.05 THOUSANDS/ΜL (ref 0–0.1)
BASOPHILS NFR BLD AUTO: 1 % (ref 0–1)
BUN SERPL-MCNC: 10 MG/DL (ref 5–25)
CALCIUM SERPL-MCNC: 8.7 MG/DL (ref 8.3–10.1)
CHLORIDE SERPL-SCNC: 105 MMOL/L (ref 100–108)
CO2 SERPL-SCNC: 26 MMOL/L (ref 21–32)
CREAT SERPL-MCNC: 0.66 MG/DL (ref 0.6–1.3)
EOSINOPHIL # BLD AUTO: 0.22 THOUSAND/ΜL (ref 0–0.61)
EOSINOPHIL NFR BLD AUTO: 3 % (ref 0–6)
ERYTHROCYTE [DISTWIDTH] IN BLOOD BY AUTOMATED COUNT: 12.9 % (ref 11.6–15.1)
GFR SERPL CREATININE-BSD FRML MDRD: 95 ML/MIN/1.73SQ M
GLUCOSE SERPL-MCNC: 142 MG/DL (ref 65–140)
HCT VFR BLD AUTO: 40.7 % (ref 34.8–46.1)
HGB BLD-MCNC: 13.2 G/DL (ref 11.5–15.4)
IMM GRANULOCYTES # BLD AUTO: 0.04 THOUSAND/UL (ref 0–0.2)
IMM GRANULOCYTES NFR BLD AUTO: 0 % (ref 0–2)
LYMPHOCYTES # BLD AUTO: 3.64 THOUSANDS/ΜL (ref 0.6–4.47)
LYMPHOCYTES NFR BLD AUTO: 41 % (ref 14–44)
MCH RBC QN AUTO: 29.3 PG (ref 26.8–34.3)
MCHC RBC AUTO-ENTMCNC: 32.4 G/DL (ref 31.4–37.4)
MCV RBC AUTO: 90 FL (ref 82–98)
MONOCYTES # BLD AUTO: 0.65 THOUSAND/ΜL (ref 0.17–1.22)
MONOCYTES NFR BLD AUTO: 7 % (ref 4–12)
NEUTROPHILS # BLD AUTO: 4.31 THOUSANDS/ΜL (ref 1.85–7.62)
NEUTS SEG NFR BLD AUTO: 48 % (ref 43–75)
NRBC BLD AUTO-RTO: 0 /100 WBCS
PLATELET # BLD AUTO: 209 THOUSANDS/UL (ref 149–390)
PMV BLD AUTO: 8.5 FL (ref 8.9–12.7)
POTASSIUM SERPL-SCNC: 3.4 MMOL/L (ref 3.5–5.3)
RBC # BLD AUTO: 4.5 MILLION/UL (ref 3.81–5.12)
SODIUM SERPL-SCNC: 142 MMOL/L (ref 136–145)
TROPONIN I SERPL-MCNC: <0.02 NG/ML
WBC # BLD AUTO: 8.91 THOUSAND/UL (ref 4.31–10.16)

## 2019-11-16 PROCEDURE — 80048 BASIC METABOLIC PNL TOTAL CA: CPT | Performed by: EMERGENCY MEDICINE

## 2019-11-16 PROCEDURE — 99285 EMERGENCY DEPT VISIT HI MDM: CPT | Performed by: EMERGENCY MEDICINE

## 2019-11-16 PROCEDURE — 84484 ASSAY OF TROPONIN QUANT: CPT | Performed by: EMERGENCY MEDICINE

## 2019-11-16 PROCEDURE — 70450 CT HEAD/BRAIN W/O DYE: CPT

## 2019-11-16 PROCEDURE — 36415 COLL VENOUS BLD VENIPUNCTURE: CPT | Performed by: EMERGENCY MEDICINE

## 2019-11-16 PROCEDURE — 93005 ELECTROCARDIOGRAM TRACING: CPT

## 2019-11-16 PROCEDURE — 85025 COMPLETE CBC W/AUTO DIFF WBC: CPT | Performed by: EMERGENCY MEDICINE

## 2019-11-16 PROCEDURE — 99284 EMERGENCY DEPT VISIT MOD MDM: CPT

## 2019-11-17 LAB
ATRIAL RATE: 60 BPM
ATRIAL RATE: 67 BPM
P AXIS: 125 DEGREES
P AXIS: 55 DEGREES
PR INTERVAL: 170 MS
PR INTERVAL: 178 MS
QRS AXIS: 164 DEGREES
QRS AXIS: 17 DEGREES
QRSD INTERVAL: 70 MS
QRSD INTERVAL: 82 MS
QT INTERVAL: 398 MS
QT INTERVAL: 422 MS
QTC INTERVAL: 420 MS
QTC INTERVAL: 422 MS
T WAVE AXIS: 152 DEGREES
T WAVE AXIS: 19 DEGREES
VENTRICULAR RATE: 60 BPM
VENTRICULAR RATE: 67 BPM

## 2019-11-17 PROCEDURE — 93010 ELECTROCARDIOGRAM REPORT: CPT | Performed by: INTERNAL MEDICINE

## 2019-11-17 NOTE — ED PROVIDER NOTES
History  Chief Complaint   Patient presents with    Numbness     Here for evaluation of L arm numbness that started around 2 pm  Pt states she had a headache and took a generic combination of acetaminophen, aspirin, and caffeine -- has never used that medication before  Denies any other symptoms, and no other symptoms present at this time  Denies pain  60 yr female--  States several hrs ago with left arm numbness tingling-- earlier in day had headache-  Which is she has had before-- not new- non thunderclap--  No lue weakness- no cp/sob/palp/near syncope no neck pain -- at present symptoms have resolved      History provided by:  Patient and relative   used: No        Prior to Admission Medications   Prescriptions Last Dose Informant Patient Reported? Taking?   amoxicillin (AMOXIL) 500 MG tablet  Self Yes No   Sig: Take 500 mg by mouth 3 (three) times a day   methylPREDNISolone 4 MG tablet therapy pack   No No   Sig: Use as directed on package   omeprazole (PriLOSEC) 20 mg delayed release capsule  Self Yes No   Sig: Take 20 mg by mouth daily      Facility-Administered Medications: None       Past Medical History:   Diagnosis Date    Anxiety disorder 2009    last assessed 09    Chronic constipation 2004    last assessed 04    Gait disturbance 2012    last assessed 12    GERD (gastroesophageal reflux disease) 2007    last assessed 3/23/07    Primary localized osteoarthritis of hip 10/27/2011    last assessed 10/27/11    Thoracic neuritis 2007    last assessed 07       Past Surgical History:   Procedure Laterality Date     SECTION      CHOLECYSTECTOMY      DENTAL SURGERY         Family History   Problem Relation Age of Onset    Coronary artery disease Father     Diabetes type II Sister      I have reviewed and agree with the history as documented      Social History     Tobacco Use    Smoking status: Former Smoker    Smokeless tobacco: Never Used   Substance Use Topics    Alcohol use: Yes     Comment: occ wine / occasional     Drug use: No        Review of Systems   Constitutional: Negative  HENT: Negative  Eyes: Negative  Respiratory: Negative  Cardiovascular: Negative  Gastrointestinal: Negative  Endocrine: Negative  Genitourinary: Negative  Musculoskeletal: Negative  Skin: Negative  Allergic/Immunologic: Negative  Neurological: Positive for numbness and headaches  Negative for dizziness, tremors, seizures, syncope, facial asymmetry, speech difficulty, weakness and light-headedness  Hematological: Negative  Psychiatric/Behavioral: Negative  Physical Exam  Physical Exam   Constitutional: She is oriented to person, place, and time  She appears well-developed and well-nourished  No distress  avss- htnsive-- - which resolved in the er -- pulse ox 97 % on ra- interpretation is normal- no intervention    HENT:   Head: Normocephalic and atraumatic  Eyes: Pupils are equal, round, and reactive to light  Conjunctivae and EOM are normal  Right eye exhibits no discharge  Left eye exhibits no discharge  No scleral icterus  Mm pink   Neck: Normal range of motion  Neck supple  No JVD present  No tracheal deviation present  No thyromegaly present  Cardiovascular: Normal rate, regular rhythm, normal heart sounds and intact distal pulses  Exam reveals no gallop and no friction rub  No murmur heard  Pulmonary/Chest: Effort normal and breath sounds normal  No stridor  No respiratory distress  She has no wheezes  She has no rales  She exhibits no tenderness  Abdominal: Soft  Bowel sounds are normal  She exhibits no distension and no mass  There is no tenderness  There is no rebound and no guarding  No hernia  Musculoskeletal: Normal range of motion  She exhibits no edema, tenderness or deformity     Equal bilateral radial/dp pulses- no ble edema/calf tenderness/asym/ erythema Lymphadenopathy:     She has no cervical adenopathy  Neurological: She is alert and oriented to person, place, and time  No cranial nerve deficit or sensory deficit  She exhibits normal muscle tone  Coordination normal    No nystagmus- neg test of sckew/ normal finger to nose - normal bue/ sensation/ strength- non focal neuro exam    Skin: Skin is warm  Capillary refill takes less than 2 seconds  No rash noted  She is not diaphoretic  No erythema  No pallor  Psychiatric: She has a normal mood and affect  Her behavior is normal  Judgment and thought content normal    Nursing note and vitals reviewed        Vital Signs  ED Triage Vitals [11/16/19 1913]   Temperature Pulse Respirations Blood Pressure SpO2   97 5 °F (36 4 °C) 75 18 (!) 193/77 97 %      Temp Source Heart Rate Source Patient Position - Orthostatic VS BP Location FiO2 (%)   Oral Monitor Lying Right arm --      Pain Score       No Pain           Vitals:    11/16/19 1913   BP: (!) 193/77   Pulse: 75   Patient Position - Orthostatic VS: Lying         Visual Acuity      ED Medications  Medications - No data to display    Diagnostic Studies  Results Reviewed     Procedure Component Value Units Date/Time    Troponin I [[id number]]  (Normal) Collected:  11/16/19 1935    Lab Status:  Final result Specimen:  Blood from Arm, Left Updated:  11/16/19 1959     Troponin I <0 02 ng/mL     Basic metabolic panel [[id number]]  (Abnormal) Collected:  11/16/19 1935    Lab Status:  Final result Specimen:  Blood from Arm, Left Updated:  11/16/19 1949     Sodium 142 mmol/L      Potassium 3 4 mmol/L      Chloride 105 mmol/L      CO2 26 mmol/L      ANION GAP 11 mmol/L      BUN 10 mg/dL      Creatinine 0 66 mg/dL      Glucose 142 mg/dL      Calcium 8 7 mg/dL      eGFR 95 ml/min/1 73sq m     Narrative:       [place] guidelines for Chronic Kidney Disease (CKD):     Stage 1 with normal or high GFR (GFR > 90 mL/min/1 73 square meters)    Stage 2 Mild CKD (GFR = 60-89 mL/min/1 73 square meters)    Stage 3A Moderate CKD (GFR = 45-59 mL/min/1 73 square meters)    Stage 3B Moderate CKD (GFR = 30-44 mL/min/1 73 square meters)    Stage 4 Severe CKD (GFR = 15-29 mL/min/1 73 square meters)    Stage 5 End Stage CKD (GFR <15 mL/min/1 73 square meters)  Note: GFR calculation is accurate only with a steady state creatinine    CBC and differential [[id number]]  (Abnormal) Collected:  11/16/19 1935    Lab Status:  Final result Specimen:  Blood from Arm, Left Updated:  11/16/19 1940     WBC 8 91 Thousand/uL      RBC 4 50 Million/uL      Hemoglobin 13 2 g/dL      Hematocrit 40 7 %      MCV 90 fL      MCH 29 3 pg      MCHC 32 4 g/dL      RDW 12 9 %      MPV 8 5 fL      Platelets 642 Thousands/uL      nRBC 0 /100 WBCs      Neutrophils Relative 48 %      Immat GRANS % 0 %      Lymphocytes Relative 41 %      Monocytes Relative 7 %      Eosinophils Relative 3 %      Basophils Relative 1 %      Neutrophils Absolute 4 31 Thousands/µL      Immature Grans Absolute 0 04 Thousand/uL      Lymphocytes Absolute 3 64 Thousands/µL      Monocytes Absolute 0 65 Thousand/µL      Eosinophils Absolute 0 22 Thousand/µL      Basophils Absolute 0 05 Thousands/µL                  CT head without contrast   Final Result by Ivonne Howell DO (11/16 2020)      No acute intracranial abnormality  Workstation performed: [id number]                    Procedures  Procedures       ED Course  ED Course as of Nov 19 2300   Sat Nov 16, 2019 2108 - ER MD NOTE- PT- RE-EVALUATED PRIOR TO ER D/C- PT ASYMPTOMATIC  WITH NO COMPS- NORMAL  NON FOCAL NEURO EXAM UNCHANGED FROM INITIAL; NEURO EXAM- SBP 'S UPON ER D/C                                  MDM    Disposition  Final diagnoses:   None     ED Disposition     None      Follow-up Information    None         Patient's Medications   Discharge Prescriptions    No medications on file     No discharge procedures on file      ED Provider  Electronically Signed by           Angelita Watts MD  11/19/19 3059

## 2019-11-17 NOTE — DISCHARGE INSTRUCTIONS
DIAGNOSIS; HEADACHE- RESOLVED- LEFT ARM TINGLING /NUMBNESS RESOLVED    - ACTIVITY AS TOLERATED    - PLEASE CALL YOUR PRIMARY DOCTOR ON Monday TO SCHEDULE AN APPOINTMENT FOR A RECHECK  THIS WEEK     - PLEASE RETURN TO  THE ER FOR ANY NEW PROBLEMS WITH VISION/ TALKING/ SWALLOWING OR WITH BALANCE ANY SIDED SIDED BODY WEAKNESS  OR ANY NEW/ WORSENING/CONCERNING SYMPTOMS TO YOU     - BLOOD SUGAR IN ER

## 2019-11-17 NOTE — ED PROCEDURE NOTE
PROCEDURE  ECG 12 Lead Documentation Only  Date/Time: 11/16/2019 8:27 PM  Performed by: Leonie Doll MD  Authorized by: Leonie Doll MD     Indications / Diagnosis:  Left arm tingling  ECG reviewed by me, the ED Provider: yes    Patient location:  Bedside and ED  Previous ECG:     Previous ECG:  Unavailable  Interpretation:     Interpretation: non-specific    Rate:     ECG rate:  60    ECG rate assessment: normal    Rhythm:     Rhythm: sinus rhythm    Ectopy:     Ectopy: none    QRS:     QRS axis:  Normal    QRS intervals:  Normal  Conduction:     Conduction: normal    ST segments:     ST segments:  Normal  T waves:     T waves: flattening      Flattening:  III, aVF, V1, V2 and V3  Other findings:     Other findings: U wave    Comments:      - no ecg signs of ischemiA/ injury/ r heart strain / blake/pericarditis         Leonie Doll MD  11/16/19 2029

## 2019-11-18 ENCOUNTER — TELEPHONE (OUTPATIENT)
Dept: FAMILY MEDICINE CLINIC | Facility: CLINIC | Age: 64
End: 2019-11-18

## 2019-11-18 DIAGNOSIS — Z12.31 BREAST CANCER SCREENING BY MAMMOGRAM: Primary | ICD-10-CM

## 2019-11-18 NOTE — TELEPHONE ENCOUNTER
Bharat Anderson was seen in the ER on Saturday for L arm pain and was advised to come in for a follow up  The next 30 min appt  Available isn't until next Tues  She still has pain below her ribs, feels as if she did too many situps and is keeping her up at night  She cannot come in for an appt   Tomorrow or Wed am   Also, need order from routine mamma that is scheduled for Wed am

## 2019-11-18 NOTE — TELEPHONE ENCOUNTER
Maryjo Clifton well then patient should have the appointment on whatever day she can come in with any provider  It can be 15 if its just to addresse the pain   Also its ok to place Mammo order in my name, while Keyana Khan is out

## 2019-11-18 NOTE — TELEPHONE ENCOUNTER
Called patient and advised mammo order is ready for   Scheduled er fu 11/21 with Dr Marilin Gillette

## 2019-11-20 ENCOUNTER — HOSPITAL ENCOUNTER (OUTPATIENT)
Dept: RADIOLOGY | Facility: HOSPITAL | Age: 64
Discharge: HOME | End: 2019-11-20
Attending: FAMILY MEDICINE
Payer: COMMERCIAL

## 2019-11-20 VITALS — BODY MASS INDEX: 30.43 KG/M2 | HEIGHT: 60 IN | WEIGHT: 155 LBS

## 2019-11-20 DIAGNOSIS — Z12.31 SCREENING MAMMOGRAM, ENCOUNTER FOR: ICD-10-CM

## 2019-11-20 PROCEDURE — 77067 SCR MAMMO BI INCL CAD: CPT

## 2019-11-21 ENCOUNTER — OFFICE VISIT (OUTPATIENT)
Dept: FAMILY MEDICINE CLINIC | Facility: CLINIC | Age: 64
End: 2019-11-21
Payer: COMMERCIAL

## 2019-11-21 VITALS
TEMPERATURE: 98.7 F | WEIGHT: 156.4 LBS | RESPIRATION RATE: 16 BRPM | HEART RATE: 78 BPM | BODY MASS INDEX: 30.7 KG/M2 | HEIGHT: 60 IN | DIASTOLIC BLOOD PRESSURE: 80 MMHG | SYSTOLIC BLOOD PRESSURE: 130 MMHG

## 2019-11-21 DIAGNOSIS — R73.09 ABNORMAL GLUCOSE: Primary | ICD-10-CM

## 2019-11-21 DIAGNOSIS — G44.209 TENSION-TYPE HEADACHE, NOT INTRACTABLE, UNSPECIFIED CHRONICITY PATTERN: ICD-10-CM

## 2019-11-21 DIAGNOSIS — B02.9 HERPES ZOSTER WITHOUT COMPLICATION: ICD-10-CM

## 2019-11-21 DIAGNOSIS — E78.5 HYPERLIPIDEMIA, UNSPECIFIED HYPERLIPIDEMIA TYPE: ICD-10-CM

## 2019-11-21 DIAGNOSIS — E55.9 VITAMIN D DEFICIENCY: ICD-10-CM

## 2019-11-21 PROCEDURE — 99213 OFFICE O/P EST LOW 20 MIN: CPT | Performed by: FAMILY MEDICINE

## 2019-11-21 RX ORDER — VALACYCLOVIR HYDROCHLORIDE 1 G/1
1000 TABLET, FILM COATED ORAL 3 TIMES DAILY
Qty: 21 TABLET | Refills: 0 | Status: SHIPPED | OUTPATIENT
Start: 2019-11-21 | End: 2020-02-24 | Stop reason: ALTCHOICE

## 2019-11-21 RX ORDER — PANTOPRAZOLE SODIUM 20 MG/1
TABLET, DELAYED RELEASE ORAL
Refills: 0 | COMMUNITY
Start: 2019-10-16 | End: 2021-10-13

## 2019-11-21 RX ORDER — MELATONIN
1000 DAILY
COMMUNITY

## 2019-11-21 NOTE — PROGRESS NOTES
Zay Espinal 1955 female MRN: [id number]    FAMILY PRACTICE OFFICE VISIT  [facility] - [address]      ASSESSMENT/PLAN  Zay Espinal is a 60 y o  female presents to the office for    Diagnoses and all orders for this visit:    Abnormal glucose    Hyperlipidemia, unspecified hyperlipidemia type    Tension-type headache, not intractable, unspecified chronicity pattern    Vitamin D deficiency  -     Vitamin D 25 hydroxy; Future    Herpes zoster without complication  -     valACYclovir (VALTREX) 1,000 mg tablet; Take 1 tablet (1,000 mg total) by mouth 3 (three) times a day for 7 days    Other orders  -     pantoprazole (PROTONIX) 20 mg tablet  -     cholecalciferol (VITAMIN D3) 1,000 units tablet; Take 1,000 Units by mouth daily       Given acute findings of Shingles, will hold off on giving script for BW  Patient had multiple complaints today that weren't able to be addressed in one visit  At this time start Valtrex TID x 7 days, recommend staying away from her daughter n law who is pregnant  Tension Headache resolved at this time after ED visit  Vit D will repeat levels at next visit  Future Appointments   Date Time Provider Cindy Nelson   11/21/2019 11:30 AM Greg Khalil MD [address] [place] [address] [facility]          SUBJECTIVE  CC: Follow-up (ER  left arm pain )      HPI:  Zay Espinal is a 60 y o  female who presents for an ER follow up  Was seen in the ED for a headache with left arm numbness, she was concern for an MI  Patient currently has no more symptoms of a headache  Symptoms completely resolved  Acid relfux taking protonix  PAtient was suppose to get her BW for Vit D, sugars, and HLD  States that her follow up with her pcp was for kellen Meltonco has started with tenderness over her right shoulder blade that wrap to the front of her breast  Rash presented today   After I told her dx was very concerned given her grandson and her daughter n law is pregnant  Recently had mammogram    Review of Systems   Constitutional: Negative for activity change, appetite change, chills, fatigue and fever  HENT: Negative for congestion  Respiratory: Negative for cough, chest tightness and shortness of breath  Cardiovascular: Negative for chest pain and leg swelling  Gastrointestinal: Negative for abdominal distention, abdominal pain, constipation, diarrhea, nausea and vomiting  Acid refulx   Skin: Positive for rash  Neurological: Negative for headaches  All other systems reviewed and are negative  Historical Information   The patient history was reviewed as follows:  Past Medical History:   Diagnosis Date    Anxiety disorder 01/27/2009    last assessed 1/27/09    Chronic constipation 04/22/2004    last assessed 4/22/04    Gait disturbance 07/12/2012    last assessed 7/12/12    GERD (gastroesophageal reflux disease) 03/23/2007    last assessed 3/23/07    Primary localized osteoarthritis of hip 10/27/2011    last assessed 10/27/11    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    last assessed 11/14/07         Medications:     Current Outpatient Medications:     cholecalciferol (VITAMIN D3) 1,000 units tablet, Take 1,000 Units by mouth daily, Disp: , Rfl:     pantoprazole (PROTONIX) 20 mg tablet, , Disp: , Rfl: 0    Allergies   Allergen Reactions    Ciprofloxacin Vomiting     Reaction Date: 25Mar2010;        OBJECTIVE  Vitals:   Vitals:    11/21/19 1112   BP: 130/80   BP Location: Left arm   Patient Position: Sitting   Cuff Size: Standard   Pulse: 78   Resp: 16   Temp: 98 7 °F (37 1 °C)   Weight: 70 9 kg (156 lb 6 4 oz)   Height: 5' (1 524 m)         Physical Exam   Constitutional: She is oriented to person, place, and time  Vital signs are normal  She appears well-developed and well-nourished  HENT:   Head: Normocephalic and atraumatic  Eyes: Pupils are equal, round, and reactive to light   Conjunctivae and EOM are normal    Neck: Normal range of motion  Neck supple  Cardiovascular: Normal rate, regular rhythm, S1 normal, S2 normal, normal heart sounds and intact distal pulses  No murmur heard  Pulmonary/Chest: Effort normal and breath sounds normal  No respiratory distress  She has no wheezes  Musculoskeletal: Normal range of motion  She exhibits no edema  Neurological: She is alert and oriented to person, place, and time  She has normal strength  Skin: Skin is warm  Rash (Under right breast line; vesicular rash present with oozing contents  ) noted  Psychiatric: She has a normal mood and affect  Her speech is normal and behavior is normal  Judgment and thought content normal    Vitals reviewed                   Jossy Cannon MD,   [facility]  11/21/2019

## 2019-11-21 NOTE — PATIENT INSTRUCTIONS
Shingles   WHAT YOU NEED TO KNOW:   Shingles is a painful infection caused by the same virus that causes chickenpox (varicella-zoster virus)  After you get chickenpox, the virus stays in your body for several years without causing any symptoms  Shingles occurs when the virus becomes active again  Once active, the virus will travel along a nerve to your skin and cause a rash  DISCHARGE INSTRUCTIONS:   Return to the emergency department if:   · You have painful, red, warm skin around the blisters, or the blisters drain pus  · Your neck is stiff or you have trouble moving it  · You have trouble moving your arms, legs, or face  · You have a seizure  · You have weakness in an arm or leg  · You become confused, or have difficulty speaking  · You have dizziness, a severe headache, hearing or vision loss  Contact your healthcare provider if:   · You feel weak or have a headache  · You have a cough, chills, or a fever  · You have abdominal pain, nausea, or vomiting  · Your rash becomes more itchy or painful  · Your rash spreads to other parts of your body  · Your pain worsens and does not go away even after taking medicine  · You have questions or concerns about your condition or care  Medicines:   · Antiviral medicine  helps decrease symptoms and healing time  They may also decrease your risk of developing nerve pain  You will need to start taking them within 3 days of the start of symptoms to prevent nerve pain  · Pain medicine  may be prescribed or suggested by your healthcare provider  You may need NSAIDs, acetaminophen, or opioid medicine depending on how much pain you are in  Do not wait until the pain is severe before you take more pain medicine  · Topical anesthetics  are used to numb the skin and decrease pain  They can be a cream, gel, spray, or patch  · Anticonvulsants  decrease nerve pain and may help you sleep at night      · Antidepressants  may be used to decrease nerve pain  · Take your medicine as directed  Contact your healthcare provider if you think your medicine is not helping or if you have side effects  Tell him of her if you are allergic to any medicine  Keep a list of the medicines, vitamins, and herbs you take  Include the amounts, and when and why you take them  Bring the list or the pill bottles to follow-up visits  Carry your medicine list with you in case of an emergency  Follow up with your healthcare provider as directed:  Write down your questions so you remember to ask them during your visits  Self-care:  Keep your rash clean and dry  Cover your rash with a bandage or clothing  Do not use bandages that stick to your skin  The sticky part may irritate your skin and make your rash last longer  Prevent the spread of shingles: The virus can be passed to a person who has never had chickenpox  This person may get chickenpox, but not shingles  You may pass the virus to others as long as you have a rash  The virus is spread by direct contact with the fluid from the blisters  Usually, you cannot spread the virus once the blisters dry up  Prevent shingles or another shingles outbreak:  A vaccine may be given to help prevent shingles  Ask for more information about this vaccine  © 2017 [employer] Information is for End User's use only and may not be sold, redistributed or otherwise used for commercial purposes  All illustrations and images included in CareNotes® are the copyrighted property of A D A M , Inc  or [employer]  The above information is an  only  It is not intended as medical advice for individual conditions or treatments  Talk to your doctor, nurse or pharmacist before following any medical regimen to see if it is safe and effective for you

## 2019-11-27 ENCOUNTER — OFFICE VISIT (OUTPATIENT)
Dept: FAMILY MEDICINE CLINIC | Facility: CLINIC | Age: 64
End: 2019-11-27
Payer: COMMERCIAL

## 2019-11-27 VITALS
HEIGHT: 60 IN | DIASTOLIC BLOOD PRESSURE: 70 MMHG | TEMPERATURE: 98.1 F | BODY MASS INDEX: 30.59 KG/M2 | RESPIRATION RATE: 16 BRPM | SYSTOLIC BLOOD PRESSURE: 130 MMHG | WEIGHT: 155.8 LBS | HEART RATE: 80 BPM

## 2019-11-27 DIAGNOSIS — R73.09 ABNORMAL GLUCOSE: ICD-10-CM

## 2019-11-27 DIAGNOSIS — G44.209 TENSION-TYPE HEADACHE, NOT INTRACTABLE, UNSPECIFIED CHRONICITY PATTERN: ICD-10-CM

## 2019-11-27 DIAGNOSIS — B02.9 HERPES ZOSTER WITHOUT COMPLICATION: Primary | ICD-10-CM

## 2019-11-27 DIAGNOSIS — E78.5 HYPERLIPIDEMIA, UNSPECIFIED HYPERLIPIDEMIA TYPE: ICD-10-CM

## 2019-11-27 DIAGNOSIS — E55.9 VITAMIN D DEFICIENCY: ICD-10-CM

## 2019-11-27 PROCEDURE — 99213 OFFICE O/P EST LOW 20 MIN: CPT | Performed by: FAMILY MEDICINE

## 2019-11-27 NOTE — PROGRESS NOTES
Indu Felipe 1955 female MRN: [id number]    FAMILY PRACTICE OFFICE VISIT  [facility] - [address]      ASSESSMENT/PLAN  Indu Felipe is a 60 y o  female presents to the office for    Diagnoses and all orders for this visit:    Herpes zoster without complication    Abnormal glucose  -     Comprehensive metabolic panel; Future    Hyperlipidemia, unspecified hyperlipidemia type  -     Lipid panel; Future    Tension-type headache, not intractable, unspecified chronicity pattern  -     CBC and differential; Future  -     TSH, 3rd generation with Free T4 reflex; Future    Vitamin D deficiency  -     Vitamin D 25 hydroxy; Future         Shingles-> Improving  Declined Neuropathy medications today  Headache-> resolved; sent for labs  Vit D-> Repeat levels; Continue on supplements   HLD-> repeat levels; continue diet and exercise  Abnormal glucose-> Repeat levels      No future appointments  SUBJECTIVE  CC: Follow-up (rash)      HPI:  Indu Felipe is a 60 y o  female who presents for BW scripts and a check on chingles  Patient states that the rash started to spread but stopped after day 3  No longer on valtrex  Patient states that she has mild pain; and doesn't want any pain medications at this time  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 and has close contact with daughter n law that is preg  Taking Vit D as prescribed  Headache has resolved since her ED visit  HLD/abnormal glucose not taking any supplements at this time  Review of Systems   Constitutional: Negative for activity change, appetite change, chills, fatigue and fever  HENT: Negative for congestion  Respiratory: Negative for cough, chest tightness and shortness of breath  Cardiovascular: Negative for chest pain and leg swelling  Gastrointestinal: Negative for abdominal distention, abdominal pain, constipation, diarrhea, nausea and vomiting  Skin: Positive for rash     All other systems reviewed and are negative  Historical Information   The patient history was reviewed as follows:  Past Medical History:   Diagnosis Date    Anxiety disorder 01/27/2009    last assessed 1/27/09    Chronic constipation 04/22/2004    last assessed 4/22/04    Gait disturbance 07/12/2012    last assessed 7/12/12    GERD (gastroesophageal reflux disease) 03/23/2007    last assessed 3/23/07    Primary localized osteoarthritis of hip 10/27/2011    last assessed 10/27/11    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    last assessed 11/14/07         Medications:     Current Outpatient Medications:     cholecalciferol (VITAMIN D3) 1,000 units tablet, Take 1,000 Units by mouth daily, Disp: , Rfl:     pantoprazole (PROTONIX) 20 mg tablet, , Disp: , Rfl: 0    valACYclovir (VALTREX) 1,000 mg tablet, Take 1 tablet (1,000 mg total) by mouth 3 (three) times a day for 7 days, Disp: 21 tablet, Rfl: 0    Allergies   Allergen Reactions    Ciprofloxacin Vomiting     Reaction Date: 25Mar2010;        OBJECTIVE  Vitals:   Vitals:    11/27/19 0851   BP: 130/70   BP Location: Left arm   Patient Position: Sitting   Cuff Size: Standard   Pulse: 80   Resp: 16   Temp: 98 1 °F (36 7 °C)   Weight: 70 7 kg (155 lb 12 8 oz)   Height: 5' (1 524 m)         Physical Exam   Constitutional: She is oriented to person, place, and time  Vital signs are normal  She appears well-developed and well-nourished  HENT:   Head: Normocephalic and atraumatic  Eyes: Pupils are equal, round, and reactive to light  Conjunctivae and EOM are normal    Neck: Normal range of motion  Neck supple  Cardiovascular: Normal rate, regular rhythm, S1 normal, S2 normal, normal heart sounds and intact distal pulses  No murmur heard  Pulmonary/Chest: Effort normal and breath sounds normal  No respiratory distress  She has no wheezes  Musculoskeletal: Normal range of motion  She exhibits no edema     Neurological: She is alert and oriented to person, place, and time  She has normal strength  Skin: Skin is warm  Multiple cluster of vesc; not oozing and crusted over Dermatone T6   Psychiatric: She has a normal mood and affect  Her speech is normal and behavior is normal  Judgment and thought content normal    Vitals reviewed                   Thomas Manley MD,   [facility]  11/27/2019

## 2019-12-06 ENCOUNTER — APPOINTMENT (OUTPATIENT)
Dept: LAB | Facility: CLINIC | Age: 64
End: 2019-12-06
Payer: COMMERCIAL

## 2019-12-06 DIAGNOSIS — E78.5 HYPERLIPIDEMIA, UNSPECIFIED HYPERLIPIDEMIA TYPE: ICD-10-CM

## 2019-12-06 DIAGNOSIS — R73.09 ABNORMAL GLUCOSE: ICD-10-CM

## 2019-12-06 DIAGNOSIS — G44.209 TENSION-TYPE HEADACHE, NOT INTRACTABLE, UNSPECIFIED CHRONICITY PATTERN: ICD-10-CM

## 2019-12-06 DIAGNOSIS — E55.9 VITAMIN D DEFICIENCY: ICD-10-CM

## 2019-12-06 LAB
25(OH)D3 SERPL-MCNC: 24.8 NG/ML (ref 30–100)
ALBUMIN SERPL BCP-MCNC: 4.3 G/DL (ref 3.5–5)
ALP SERPL-CCNC: 92 U/L (ref 46–116)
ALT SERPL W P-5'-P-CCNC: 28 U/L (ref 12–78)
ANION GAP SERPL CALCULATED.3IONS-SCNC: 4 MMOL/L (ref 4–13)
AST SERPL W P-5'-P-CCNC: 18 U/L (ref 5–45)
BASOPHILS # BLD AUTO: 0.04 THOUSANDS/ΜL (ref 0–0.1)
BASOPHILS NFR BLD AUTO: 1 % (ref 0–1)
BILIRUB SERPL-MCNC: 0.59 MG/DL (ref 0.2–1)
BUN SERPL-MCNC: 14 MG/DL (ref 5–25)
CALCIUM SERPL-MCNC: 9.5 MG/DL (ref 8.3–10.1)
CHLORIDE SERPL-SCNC: 109 MMOL/L (ref 100–108)
CHOLEST SERPL-MCNC: 193 MG/DL (ref 50–200)
CO2 SERPL-SCNC: 28 MMOL/L (ref 21–32)
CREAT SERPL-MCNC: 0.69 MG/DL (ref 0.6–1.3)
EOSINOPHIL # BLD AUTO: 0.34 THOUSAND/ΜL (ref 0–0.61)
EOSINOPHIL NFR BLD AUTO: 5 % (ref 0–6)
ERYTHROCYTE [DISTWIDTH] IN BLOOD BY AUTOMATED COUNT: 13.4 % (ref 11.6–15.1)
GFR SERPL CREATININE-BSD FRML MDRD: 94 ML/MIN/1.73SQ M
GLUCOSE P FAST SERPL-MCNC: 104 MG/DL (ref 65–99)
HCT VFR BLD AUTO: 43.7 % (ref 34.8–46.1)
HDLC SERPL-MCNC: 48 MG/DL
HGB BLD-MCNC: 13.8 G/DL (ref 11.5–15.4)
IMM GRANULOCYTES # BLD AUTO: 0.01 THOUSAND/UL (ref 0–0.2)
IMM GRANULOCYTES NFR BLD AUTO: 0 % (ref 0–2)
LDLC SERPL CALC-MCNC: 125 MG/DL (ref 0–100)
LYMPHOCYTES # BLD AUTO: 2.38 THOUSANDS/ΜL (ref 0.6–4.47)
LYMPHOCYTES NFR BLD AUTO: 36 % (ref 14–44)
MCH RBC QN AUTO: 28.9 PG (ref 26.8–34.3)
MCHC RBC AUTO-ENTMCNC: 31.6 G/DL (ref 31.4–37.4)
MCV RBC AUTO: 91 FL (ref 82–98)
MONOCYTES # BLD AUTO: 0.53 THOUSAND/ΜL (ref 0.17–1.22)
MONOCYTES NFR BLD AUTO: 8 % (ref 4–12)
NEUTROPHILS # BLD AUTO: 3.34 THOUSANDS/ΜL (ref 1.85–7.62)
NEUTS SEG NFR BLD AUTO: 50 % (ref 43–75)
NONHDLC SERPL-MCNC: 145 MG/DL
NRBC BLD AUTO-RTO: 0 /100 WBCS
PLATELET # BLD AUTO: 230 THOUSANDS/UL (ref 149–390)
PMV BLD AUTO: 9.3 FL (ref 8.9–12.7)
POTASSIUM SERPL-SCNC: 4.2 MMOL/L (ref 3.5–5.3)
PROT SERPL-MCNC: 7.7 G/DL (ref 6.4–8.2)
RBC # BLD AUTO: 4.78 MILLION/UL (ref 3.81–5.12)
SODIUM SERPL-SCNC: 141 MMOL/L (ref 136–145)
TRIGL SERPL-MCNC: 98 MG/DL
TSH SERPL DL<=0.05 MIU/L-ACNC: 1.47 UIU/ML (ref 0.36–3.74)
WBC # BLD AUTO: 6.64 THOUSAND/UL (ref 4.31–10.16)

## 2019-12-06 PROCEDURE — 36415 COLL VENOUS BLD VENIPUNCTURE: CPT

## 2019-12-06 PROCEDURE — 80053 COMPREHEN METABOLIC PANEL: CPT

## 2019-12-06 PROCEDURE — 80061 LIPID PANEL: CPT

## 2019-12-06 PROCEDURE — 82306 VITAMIN D 25 HYDROXY: CPT

## 2019-12-06 PROCEDURE — 84443 ASSAY THYROID STIM HORMONE: CPT

## 2019-12-06 PROCEDURE — 85025 COMPLETE CBC W/AUTO DIFF WBC: CPT

## 2019-12-27 ENCOUNTER — TELEPHONE (OUTPATIENT)
Dept: FAMILY MEDICINE CLINIC | Facility: CLINIC | Age: 64
End: 2019-12-27

## 2019-12-27 DIAGNOSIS — R06.83 SNORING: Primary | ICD-10-CM

## 2020-01-25 ENCOUNTER — OFFICE VISIT (OUTPATIENT)
Dept: URGENT CARE | Facility: CLINIC | Age: 65
End: 2020-01-25
Payer: COMMERCIAL

## 2020-01-25 VITALS
BODY MASS INDEX: 31.8 KG/M2 | SYSTOLIC BLOOD PRESSURE: 140 MMHG | DIASTOLIC BLOOD PRESSURE: 80 MMHG | RESPIRATION RATE: 16 BRPM | HEIGHT: 60 IN | OXYGEN SATURATION: 97 % | TEMPERATURE: 103.8 F | WEIGHT: 162 LBS | HEART RATE: 108 BPM

## 2020-01-25 DIAGNOSIS — R68.89 INFLUENZA-LIKE SYMPTOMS: ICD-10-CM

## 2020-01-25 DIAGNOSIS — R50.9 FEVER, UNSPECIFIED: Primary | ICD-10-CM

## 2020-01-25 PROCEDURE — 99213 OFFICE O/P EST LOW 20 MIN: CPT | Performed by: PHYSICIAN ASSISTANT

## 2020-01-25 RX ORDER — OSELTAMIVIR PHOSPHATE 75 MG/1
75 CAPSULE ORAL 2 TIMES DAILY
Qty: 10 CAPSULE | Refills: 0 | Status: SHIPPED | OUTPATIENT
Start: 2020-01-25 | End: 2020-01-30

## 2020-01-25 RX ORDER — IBUPROFEN 200 MG
400 TABLET ORAL ONCE
Status: COMPLETED | OUTPATIENT
Start: 2020-01-25 | End: 2020-01-25

## 2020-01-25 RX ADMIN — Medication 400 MG: at 10:17

## 2020-01-25 NOTE — PATIENT INSTRUCTIONS
Influenza   WHAT YOU NEED TO KNOW:   Influenza (the flu) is an infection caused by the influenza virus  The flu is easily spread when an infected person coughs, sneezes, or has close contact with others  You may be able to spread the flu to others for 1 week or longer after signs or symptoms appear  DISCHARGE INSTRUCTIONS:   Call 911 for any of the following:   · You have trouble breathing, and your lips look purple or blue  · You have a seizure  Return to the emergency department if:   · You are dizzy, or you are urinating less or not at all  · You have a headache with a stiff neck, and you feel tired or confused  · You have new pain or pressure in your chest     · Your symptoms, such as shortness of breath, vomiting, or diarrhea, get worse  · Your symptoms, such as fever and coughing, seem to get better, but then get worse  Contact your healthcare provider if:   · You have new muscle pain or weakness  · You have questions or concerns about your condition or care  Medicines: You may need any of the following:  · Acetaminophen  decreases pain and fever  It is available without a doctor's order  Ask how much to take and how often to take it  Follow directions  Acetaminophen can cause liver damage if not taken correctly  · NSAIDs , such as ibuprofen, help decrease swelling, pain, and fever  This medicine is available with or without a doctor's order  NSAIDs can cause stomach bleeding or kidney problems in certain people  If you take blood thinner medicine, always ask your healthcare provider if NSAIDs are safe for you  Always read the medicine label and follow directions  · Antivirals  help fight a viral infection  · Take your medicine as directed  Contact your healthcare provider if you think your medicine is not helping or if you have side effects  Tell him or her if you are allergic to any medicine  Keep a list of the medicines, vitamins, and herbs you take   Include the amounts, and when and why you take them  Bring the list or the pill bottles to follow-up visits  Carry your medicine list with you in case of an emergency  Rest  as much as you can to help you recover  Drink liquids as directed  to help prevent dehydration  Ask how much liquid to drink each day and which liquids are best for you  Prevent the spread of influenza:   · Wash your hands often  Use soap and water  Wash your hands after you use the bathroom, change a child's diapers, or sneeze  Wash your hands before you prepare or eat food  Use gel hand cleanser when soap and water are not available  Do not touch your eyes, nose, or mouth unless you have washed your hands first            · Cover your mouth when you sneeze or cough  Cough into a tissue or the bend of your arm  · Clean shared items with a germ-killing   Clean table surfaces, doorknobs, and light switches  Do not share towels, silverware, and dishes with people who are sick  Wash bed sheets, towels, silverware, and dishes with soap and water  · Wear a mask  over your mouth and nose if you are sick or are near anyone who is sick  · Stay away from others  if you are sick  · Influenza vaccine  helps prevent influenza (flu)  Everyone older than 6 months should get a yearly influenza vaccine  Get the vaccine as soon as it is available, usually in September or October each year  Follow up with your healthcare provider as directed:  Write down your questions so you remember to ask them during your visits  © 2017 [facility] Information is for End User's use only and may not be sold, redistributed or otherwise used for commercial purposes  All illustrations and images included in CareNotes® are the copyrighted property of [employer]  or [employer]en Melton  The above information is an  only  It is not intended as medical advice for individual conditions or treatments   Talk to your doctor, nurse or pharmacist before following any medical regimen to see if it is safe and effective for you

## 2020-01-25 NOTE — PROGRESS NOTES
[facility] [employer] [facility]        NAME: Gi Hendrix is a 60 y o  female  : 1955    MRN: [id number]  DATE: 2020  TIME: 12:52 PM    Assessment and Plan   Fever, unspecified [R50 9]  1  Fever, unspecified  ibuprofen (MOTRIN) tablet 400 mg   2  Influenza-like symptoms  oseltamivir (TAMIFLU) 75 mg capsule         Patient Instructions     Discussed condition with pt  Presentation is concerning for Influenza  We discussed options and ultimately decided to start pt on Tamiflu  and rec hydration, rest, discussed OTC cough/cold meds, fever management, need to quarantine and wear mask if must go out  Follow up with PCP in 3-5 days  Proceed to  ER if symptoms worsen  Chief Complaint     Chief Complaint   Patient presents with    Flu Symptoms     Patient complains of flu like symptoms starting thursday evening, body aches, chills, congestion, cough  Has been taking over the counter cold and flu medication  History of Present Illness       Pt presents with onset 2 days ago of fever, chills, myalgias, dry cough, congestion  Denies ST, ear pain, N/V/D  Has taken OTC medications for symptoms  Does not smoke or vape  Review of Systems   Review of Systems   Constitutional: Positive for chills and fever  HENT: Positive for congestion  Negative for ear pain and sore throat  Respiratory: Positive for cough  Cardiovascular: Negative  Gastrointestinal: Negative  Genitourinary: Negative  Musculoskeletal: Positive for myalgias           Current Medications       Current Outpatient Medications:     cholecalciferol (VITAMIN D3) 1,000 units tablet, Take 1,000 Units by mouth daily, Disp: , Rfl:     pantoprazole (PROTONIX) 20 mg tablet, , Disp: , Rfl: 0    valACYclovir (VALTREX) 1,000 mg tablet, Take 1 tablet (1,000 mg total) by mouth 3 (three) times a day for 7 days, Disp: 21 tablet, Rfl: 0    Current Allergies     Allergies as of 2020 - Reviewed 2020   Allergen Reaction Noted    Ciprofloxacin Vomiting 2010            The following portions of the patient's history were reviewed and updated as appropriate: allergies, current medications, past family history, past medical history, past social history, past surgical history and problem list      Past Medical History:   Diagnosis Date    Anxiety disorder 2009    last assessed 09    Chronic constipation 2004    last assessed 04    Gait disturbance 2012    last assessed 12    GERD (gastroesophageal reflux disease) 2007    last assessed 3/23/07    Primary localized osteoarthritis of hip 10/27/2011    last assessed 10/27/11    Thoracic neuritis 2007    last assessed 07       Past Surgical History:   Procedure Laterality Date     SECTION      CHOLECYSTECTOMY      DENTAL SURGERY         Family History   Problem Relation Age of Onset    Coronary artery disease Father     No Known Problems Sister     No Known Problems Mother     No Known Problems Maternal Grandmother     No Known Problems Paternal Grandmother     No Known Problems Sister     No Known Problems Sister     No Known Problems Sister     Esophageal cancer Paternal Aunt          Medications have been verified  Objective   /80 (BP Location: Left arm, Patient Position: Sitting, Cuff Size: Standard)   Pulse (!) 108   Temp (!) 103 8 °F (39 9 °C) (Tympanic)   Resp 16   Ht 5' (1 524 m)   Wt 73 5 kg (162 lb)   SpO2 97%   BMI 31 64 kg/m²        Physical Exam     Physical Exam   Constitutional: She is oriented to person, place, and time  She appears well-developed and well-nourished  No distress  Pt ill appearing   HENT:   Right Ear: Hearing, tympanic membrane, external ear and ear canal normal    Left Ear: Hearing, tympanic membrane, external ear and ear canal normal    Nose: Mucosal edema (B/L boggy turbinates) present     Mouth/Throat: Mucous membranes are normal  Posterior oropharyngeal erythema (PND) present  No oropharyngeal exudate  Neck: Neck supple  Cardiovascular: Normal rate, regular rhythm and normal heart sounds  Pulmonary/Chest: Effort normal and breath sounds normal    Lymphadenopathy:     She has no cervical adenopathy  Neurological: She is alert and oriented to person, place, and time  Psychiatric: She has a normal mood and affect  Vitals reviewed

## 2020-01-28 ENCOUNTER — OFFICE VISIT (OUTPATIENT)
Dept: FAMILY MEDICINE CLINIC | Facility: CLINIC | Age: 65
End: 2020-01-28
Payer: COMMERCIAL

## 2020-01-28 VITALS
DIASTOLIC BLOOD PRESSURE: 70 MMHG | RESPIRATION RATE: 14 BRPM | BODY MASS INDEX: 31.8 KG/M2 | HEIGHT: 60 IN | WEIGHT: 162 LBS | TEMPERATURE: 99.1 F | HEART RATE: 72 BPM | SYSTOLIC BLOOD PRESSURE: 130 MMHG

## 2020-01-28 DIAGNOSIS — R68.89 FLU-LIKE SYMPTOMS: Primary | ICD-10-CM

## 2020-01-28 DIAGNOSIS — R05.9 COUGH: ICD-10-CM

## 2020-01-28 DIAGNOSIS — H61.22 IMPACTED CERUMEN OF LEFT EAR: ICD-10-CM

## 2020-01-28 PROCEDURE — 99213 OFFICE O/P EST LOW 20 MIN: CPT | Performed by: FAMILY MEDICINE

## 2020-01-28 PROCEDURE — 1036F TOBACCO NON-USER: CPT | Performed by: FAMILY MEDICINE

## 2020-01-28 PROCEDURE — 3008F BODY MASS INDEX DOCD: CPT | Performed by: FAMILY MEDICINE

## 2020-01-28 PROCEDURE — 69210 REMOVE IMPACTED EAR WAX UNI: CPT | Performed by: FAMILY MEDICINE

## 2020-01-28 NOTE — PROGRESS NOTES
Parmjit Cruz 1955 female MRN: [id number]    [address] [place] [address]      ASSESSMENT/PLAN  Parmjit Cruz is a 60 y o  female presents to the office for    Diagnoses and all orders for this visit:    Flu-like symptoms    Cough    Impacted cerumen of left ear     complete course of Tamiflu  Left impacted cerumen removed with alligator forceps and Curette  Tolerated well  Would recommend using Debrox as needed  Right impacted cerumen unable to remove  Recommend to come back for evaluation    Use Mucinex twice a day for cough suppression    BMI Counseling: Body mass index is 31 64 kg/m²  The BMI is above normal  Exercise recommendations include exercising 3-5 times per week  Return to the office in 2 weeks of symptoms do not improve      Future Appointments   Date Time Provider Cindy Nelson   2/24/2020 11:00 AM Adithya Rolle, [address]  CC: Follow-up (cough/congestion saw UC given tamiflu on 4th day)      HPI:  Parmjit Cruz is a 60 y o  female who presents for an acute appointment  Recently seen at urgent care for flu-like symptoms and fevers as high as 103  Patient states that she was on Tamiflu for the last 4 days and has shown significant improvement however continues to have a persistent cough with fatigue  Patient states that she is eating chicken noodle soup in small amounts to help with her energy level  She is taking Tylenol at nighttime to help with the fevers and chills  Patient has not used anything over-the-counter for cough  Currently her  is also sick with the flu  Review of Systems   Constitutional: Positive for activity change, chills and fever  Negative for appetite change and fatigue  HENT: Negative for congestion  Respiratory: Positive for cough and wheezing  Negative for chest tightness and shortness of breath      Cardiovascular: Negative for chest pain and leg swelling  Gastrointestinal: Negative for abdominal distention, abdominal pain, constipation, diarrhea, nausea and vomiting  Musculoskeletal: Positive for myalgias  All other systems reviewed and are negative  Historical Information   The patient history was reviewed as follows:  Past Medical History:   Diagnosis Date    Anxiety disorder 01/27/2009    last assessed 1/27/09    Chronic constipation 04/22/2004    last assessed 4/22/04    Gait disturbance 07/12/2012    last assessed 7/12/12    GERD (gastroesophageal reflux disease) 03/23/2007    last assessed 3/23/07    Primary localized osteoarthritis of hip 10/27/2011    last assessed 10/27/11    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    last assessed 11/14/07         Medications:     Current Outpatient Medications:     cholecalciferol (VITAMIN D3) 1,000 units tablet, Take 1,000 Units by mouth daily, Disp: , Rfl:     oseltamivir (TAMIFLU) 75 mg capsule, Take 1 capsule (75 mg total) by mouth 2 (two) times a day for 5 days, Disp: 10 capsule, Rfl: 0    pantoprazole (PROTONIX) 20 mg tablet, , Disp: , Rfl: 0    valACYclovir (VALTREX) 1,000 mg tablet, Take 1 tablet (1,000 mg total) by mouth 3 (three) times a day for 7 days, Disp: 21 tablet, Rfl: 0    Allergies   Allergen Reactions    Ciprofloxacin Vomiting     Reaction Date: 25Mar2010;        OBJECTIVE  Vitals:   Vitals:    01/28/20 1330   BP: 130/70   BP Location: Left arm   Patient Position: Sitting   Cuff Size: Adult   Pulse: 72   Resp: 14   Temp: 99 1 °F (37 3 °C)   TempSrc: Tympanic   Weight: 73 5 kg (162 lb)   Height: 5' (1 524 m)         Physical Exam   Constitutional: She is oriented to person, place, and time  Vital signs are normal  She appears well-developed and well-nourished  HENT:   Head: Normocephalic and atraumatic     Left Ear: External ear normal    Nose: Nose normal    Mouth/Throat: Oropharynx is clear and moist    Right cerumen impacted   Eyes: Pupils are equal, round, and reactive to light  Conjunctivae and EOM are normal    Neck: Normal range of motion  Neck supple  Cardiovascular: Normal rate, regular rhythm, S1 normal, S2 normal, normal heart sounds and intact distal pulses  No murmur heard  Pulmonary/Chest: Effort normal and breath sounds normal  No respiratory distress  She has no wheezes  Coarse breath sounds on lower bases  No rales or wheezes appreciated   Musculoskeletal: Normal range of motion  She exhibits no edema  Neurological: She is alert and oriented to person, place, and time  She has normal strength  Skin: Skin is warm  Psychiatric: She has a normal mood and affect  Her speech is normal and behavior is normal  Judgment and thought content normal    Vitals reviewed  Ear cerumen removal  Date/Time: 1/28/2020 2:19 PM  Performed by: Lazaro Lindsay MD  Authorized by: Lazaro Lindsay MD     Patient location:  Clinic  Consent:     Consent obtained:  Verbal    Consent given by:  Patient    Risks discussed:  Bleeding, dizziness, incomplete removal, pain and TM perforation    Alternatives discussed:  Delayed treatment  Universal protocol:     Procedure explained and questions answered to patient or proxy's satisfaction: yes      Patient identity confirmed:  Verbally with patient  Procedure details:     Local anesthetic:  None    Location:  L ear    Procedure type: curette      Approach:  External and internal  Post-procedure details:     Complication:  None    Hearing quality:  Improved    Patient tolerance of procedure:   Tolerated well, no immediate complications          Lazaro Lindsay MD,   [facility]  1/28/2020

## 2020-02-24 ENCOUNTER — OFFICE VISIT (OUTPATIENT)
Dept: PULMONOLOGY | Facility: MEDICAL CENTER | Age: 65
End: 2020-02-24
Payer: COMMERCIAL

## 2020-02-24 VITALS
BODY MASS INDEX: 33.58 KG/M2 | SYSTOLIC BLOOD PRESSURE: 122 MMHG | OXYGEN SATURATION: 98 % | WEIGHT: 160 LBS | RESPIRATION RATE: 12 BRPM | DIASTOLIC BLOOD PRESSURE: 62 MMHG | HEIGHT: 58 IN | HEART RATE: 72 BPM | TEMPERATURE: 97.7 F

## 2020-02-24 DIAGNOSIS — G47.19 DAYTIME HYPERSOMNOLENCE: Primary | ICD-10-CM

## 2020-02-24 PROCEDURE — 99203 OFFICE O/P NEW LOW 30 MIN: CPT | Performed by: NURSE PRACTITIONER

## 2020-02-24 PROCEDURE — 1036F TOBACCO NON-USER: CPT | Performed by: NURSE PRACTITIONER

## 2020-02-24 PROCEDURE — 3008F BODY MASS INDEX DOCD: CPT | Performed by: NURSE PRACTITIONER

## 2020-02-24 RX ORDER — OSELTAMIVIR PHOSPHATE 75 MG/1
CAPSULE ORAL
COMMUNITY
Start: 2020-01-25 | End: 2020-02-24 | Stop reason: ALTCHOICE

## 2020-02-24 NOTE — PATIENT INSTRUCTIONS
Sleep Apnea   AMBULATORY CARE:   Sleep apnea  is also called obstructive sleep apnea  It is a condition that causes you to stop breathing for 10 seconds or more while you are sleeping  During normal sleep, your throat is kept open by muscles that let air pass through easily  Sleep apnea causes the muscles and tissues around your throat to relax and block air from passing through  Sleep apnea may happen many times while you are asleep  Common symptoms include the following:   · No signs of breathing for 10 seconds or more while you sleep    · Snoring loudly, snorting, gasping or choking while you sleep, and waking up suddenly because of these    · A hard time thinking, remembering things, or focusing on your tasks the following day    · Headache or nausea    · Bedwetting or waking up often during the night to urinate    · Feeling sleepy, slow, and tired during the day  Seek care immediately if:   · You have chest pain or trouble breathing  Contact your healthcare provider if:   · You feel tired or depressed  · You have trouble staying awake during the day  · You have trouble thinking clearly  · You have questions or concerns about your condition or care  Treatment for sleep apnea  includes using a continuous positive airway pressure (CPAP) machine to keep your airway open during sleep  A mask is placed over your nose and mouth, or just your nose  The mask is hooked to the CPAP machine that blows a gentle stream of air into the mask when you breathe  This helps keep your airway open so you can breathe more regularly  Extra oxygen may be given to you through the machine  You may be given a mouth device  It looks like a mouth guard or dental retainer and stops your tongue and mouth tissues from blocking your throat while you sleep  Surgery may be needed to remove extra tissues that block your mouth, throat, or nose  Manage sleep apnea:   · Do not smoke    Nicotine and other chemicals in cigarettes and cigars can cause lung damage  Ask your healthcare provider for information if you currently smoke and need help to quit  E-cigarettes or smokeless tobacco still contain nicotine  Talk to your healthcare provider before you use these products  · Do not drink alcohol or take sedative medicine before you go to sleep  Alcohol and sedatives can relax the muscles and tissues around your throat  This can block the airflow to your lungs  · Maintain a healthy weight  Excess tissue around your throat may restrict your breathing  Ask your healthcare provider for information if you need to lose weight  · Sleep on your side or use pillows designed to prevent sleep apnea  This prevents your tongue or other tissues from blocking your throat  You can also raise the head of your bed  Follow up with your healthcare provider as directed:  Write down your questions so you remember to ask them during your visits  © 2017 [address] [employer] Information is for End User's use only and may not be sold, redistributed or otherwise used for commercial purposes  All illustrations and images included in CareNotes® are the copyrighted property of [employer]  or Alden Melton  The above information is an  only  It is not intended as medical advice for individual conditions or treatments  Talk to your doctor, nurse or pharmacist before following any medical regimen to see if it is safe and effective for you

## 2020-02-24 NOTE — PROGRESS NOTES
Assessment/Plan:       Problem List Items Addressed This Visit        Other    Daytime hypersomnolence - Primary     Melissa Metcalf is here today for evaluation of possible obstructive sleep apnea  She has an elevated [place] Sleepiness Scale of 13 and a crowded oral airway  She does report to wakening with snoring  I briefly reviewed anatomy the upper airway diagnosis and treatment of obstructive sleep apnea  She would prefer an in-lab study as she has interrupted sleep due to interruption in her house  Madelin Wang is sleeps approximately 7-8 hours per night  She recently got a Fitbit so to better monitor her quality of sleep  Her sleep data reveals a wide range of quality of sleep and appears to have low amounts of REM sleep  I will order an in-lab study  I feel she is at high risk for obstructive sleep apnea  Patient has agreed with plan of care                 No follow-ups on file  All questions are answered to the patient's satisfaction and understanding  She verbalizes understanding  She is encouraged to call with any further questions or concerns  Portions of the record may have been created with voice recognition software  Occasional wrong word or "sound a like" substitutions may have occurred due to the inherent limitations of voice recognition software  Read the chart carefully and recognize, using context, where substitutions have occurred  a  HPI:  Acosta hassan is a 62-year-old female who is here today for chief complaint of daytime hypersomnolence  She has a Fitbit that she has used for approximately 2-3 weeks  It is noted that she has irregular sleeping patterns with reduced indication of REM  Patient does have a history of GERD and vitamin D deficiency  Body mass index is slightly elevated at 33  Patient has been retired for 10 years  Previous to that time she worked at the [employer] facility where she worked swing shift    Electronically Signed by Rain Tam CRNP    ______________________________________________________________________    Chief Complaint:   Chief Complaint   Patient presents with    Sleep Apnea     pt is here for consult     Sleeping Problem     pt states that she falls asleep easily if sitting  Pt reports snoring at night         Patient ID: [place] is a 60 y o  y o  female has a past medical history of Anxiety disorder (2009), Chronic constipation (2004), Gait disturbance (2012), GERD (gastroesophageal reflux disease) (2007), Primary localized osteoarthritis of hip (10/27/2011), and Thoracic neuritis (2007)  2020  Patient presents today for initial visit  Fatigue   This is a chronic problem  The current episode started more than 1 year ago  The problem occurs daily  The problem has been gradually worsening  Associated symptoms include fatigue  The symptoms are aggravated by exertion  She has tried lying down, walking and sleep for the symptoms  The treatment provided mild relief  Occupational/Exposure history:  Pets/Enviroment:  Travel history:  Review of Systems   Constitutional: Positive for fatigue  HENT: Negative  Eyes: Negative  Respiratory: Negative  Cardiovascular: Negative  Gastrointestinal: Negative  Endocrine: Negative  Genitourinary: Negative  Musculoskeletal: Negative  Skin: Negative  Allergic/Immunologic: Negative  Psychiatric/Behavioral: Negative  Social history: She reports that she quit smoking about 34 years ago  She has a 15 00 pack-year smoking history  She has never used smokeless tobacco  She reports that she drinks alcohol  She reports that she does not use drugs      Past surgical history:   Past Surgical History:   Procedure Laterality Date     SECTION      CHOLECYSTECTOMY      DENTAL SURGERY       Family history:   Family History   Problem Relation Age of Onset    Coronary artery disease Father     No Known Problems Sister     No Known Problems Mother     No Known Problems Maternal Grandmother     No Known Problems Paternal Grandmother     No Known Problems Sister     No Known Problems Sister     No Known Problems Sister     Esophageal cancer Paternal Aunt        Immunization History   Administered Date(s) Administered    Hep B, adult 12/13/2012, 01/15/2013, 06/13/2013    Influenza TIV (IM) 1955, 12/13/2012, 10/22/2013    Tdap 08/28/2013     Current Outpatient Medications   Medication Sig Dispense Refill    cholecalciferol (VITAMIN D3) 1,000 units tablet Take 1,000 Units by mouth daily      LYSINE PO Take by mouth      pantoprazole (PROTONIX) 20 mg tablet   0     No current facility-administered medications for this visit  Allergies: Ciprofloxacin    Objective:  Vitals:    02/24/20 1046   BP: 122/62   BP Location: Left arm   Patient Position: Sitting   Cuff Size: Standard   Pulse: 72   Resp: 12   Temp: 97 7 °F (36 5 °C)   TempSrc: Tympanic   SpO2: 98%   Weight: 72 6 kg (160 lb)   Height: 4' 10" (1 473 m)   Oxygen Therapy  SpO2: 98 %    Wt Readings from Last 3 Encounters:   02/24/20 72 6 kg (160 lb)   01/28/20 73 5 kg (162 lb)   01/25/20 73 5 kg (162 lb)     Body mass index is 33 44 kg/m²  Physical Exam   Constitutional: She appears well-developed and well-nourished  BMI 33   HENT:   Head: Normocephalic and atraumatic  Mallampati 4   Eyes: Pupils are equal, round, and reactive to light  EOM are normal    Neck: Normal range of motion  Cardiovascular: Normal rate  Pulmonary/Chest: Effort normal    Abdominal: Soft  Musculoskeletal: Normal range of motion  Skin: Skin is warm and dry  Capillary refill takes less than 2 seconds  Psychiatric: She has a normal mood and affect   Her behavior is normal        Lab Review:   Appointment on 12/06/2019   Component Date Value    Sodium 12/06/2019 141     Potassium 12/06/2019 4 2     Chloride 12/06/2019 109*    CO2 12/06/2019 28     ANION GAP 12/06/2019 4     BUN 12/06/2019 14     Creatinine 12/06/2019 0 69     Glucose, Fasting 12/06/2019 104*    Calcium 12/06/2019 9 5     AST 12/06/2019 18     ALT 12/06/2019 28     Alkaline Phosphatase 12/06/2019 92     Total Protein 12/06/2019 7 7     Albumin 12/06/2019 4 3     Total Bilirubin 12/06/2019 0 59     eGFR 12/06/2019 94     WBC 12/06/2019 6 64     RBC 12/06/2019 4 78     Hemoglobin 12/06/2019 13 8     Hematocrit 12/06/2019 43 7     MCV 12/06/2019 91     MCH 12/06/2019 28 9     MCHC 12/06/2019 31 6     RDW 12/06/2019 13 4     MPV 12/06/2019 9 3     Platelets 00/52/0339 230     nRBC 12/06/2019 0     Neutrophils Relative 12/06/2019 50     Immat GRANS % 12/06/2019 0     Lymphocytes Relative 12/06/2019 36     Monocytes Relative 12/06/2019 8     Eosinophils Relative 12/06/2019 5     Basophils Relative 12/06/2019 1     Neutrophils Absolute 12/06/2019 3 34     Immature Grans Absolute 12/06/2019 0 01     Lymphocytes Absolute 12/06/2019 2 38     Monocytes Absolute 12/06/2019 0 53     Eosinophils Absolute 12/06/2019 0 34     Basophils Absolute 12/06/2019 0 04     TSH 3RD GENERATON 12/06/2019 1 470     Cholesterol 12/06/2019 193     Triglycerides 12/06/2019 98     HDL, Direct 12/06/2019 48     LDL Calculated 12/06/2019 125*    Non-HDL-Chol (CHOL-HDL) 12/06/2019 145     Vit D, 25-Hydroxy 12/06/2019 24 8*   Admission on 11/16/2019, Discharged on 11/16/2019   Component Date Value    WBC 11/16/2019 8 91     RBC 11/16/2019 4 50     Hemoglobin 11/16/2019 13 2     Hematocrit 11/16/2019 40 7     MCV 11/16/2019 90     MCH 11/16/2019 29 3     MCHC 11/16/2019 32 4     RDW 11/16/2019 12 9     MPV 11/16/2019 8 5*    Platelets 38/63/2237 209     nRBC 11/16/2019 0     Neutrophils Relative 11/16/2019 48     Immat GRANS % 11/16/2019 0     Lymphocytes Relative 11/16/2019 41     Monocytes Relative 11/16/2019 7     Eosinophils Relative 11/16/2019 3     Basophils Relative 11/16/2019 1     Neutrophils Absolute 11/16/2019 4 31     Immature Grans Absolute 11/16/2019 0 04     Lymphocytes Absolute 11/16/2019 3 64     Monocytes Absolute 11/16/2019 0 65     Eosinophils Absolute 11/16/2019 0 22     Basophils Absolute 11/16/2019 0 05     Sodium 11/16/2019 142     Potassium 11/16/2019 3 4*    Chloride 11/16/2019 105     CO2 11/16/2019 26     ANION GAP 11/16/2019 11     BUN 11/16/2019 10     Creatinine 11/16/2019 0 66     Glucose 11/16/2019 142*    Calcium 11/16/2019 8 7     eGFR 11/16/2019 95     Troponin I 11/16/2019 <0 02     Ventricular Rate 11/16/2019 67     Atrial Rate 11/16/2019 67     RI Interval 11/16/2019 170     QRSD Interval 11/16/2019 70     QT Interval 11/16/2019 398     QTC Interval 11/16/2019 420     P Axis 11/16/2019 125     QRS Axis 11/16/2019 164     T Wave Axis 11/16/2019 152     Ventricular Rate 11/16/2019 60     Atrial Rate 11/16/2019 60     RI Interval 11/16/2019 178     QRSD Interval 11/16/2019 82     QT Interval 11/16/2019 422     QTC Interval 11/16/2019 422     P Axis 11/16/2019 55     QRS Axis 11/16/2019 17     T Wave Axis 11/16/2019 19          Diagnostics:  I have personally reviewed pertinent reports  Office Spirometry Results:     ESS: Total score: 13  No results found

## 2020-04-13 ENCOUNTER — TRANSCRIBE ORDERS (OUTPATIENT)
Dept: SLEEP CENTER | Facility: CLINIC | Age: 65
End: 2020-04-13

## 2020-04-13 DIAGNOSIS — R40.0 DAYTIME SLEEPINESS: Primary | ICD-10-CM

## 2020-05-15 ENCOUNTER — HOSPITAL ENCOUNTER (OUTPATIENT)
Dept: SLEEP CENTER | Facility: CLINIC | Age: 65
Discharge: HOME | End: 2020-05-15
Payer: COMMERCIAL

## 2020-05-15 DIAGNOSIS — R40.0 DAYTIME SLEEPINESS: ICD-10-CM

## 2020-05-15 PROCEDURE — 95810 POLYSOM 6/> YRS 4/> PARAM: CPT | Performed by: INTERNAL MEDICINE

## 2020-05-15 PROCEDURE — 95810 POLYSOM 6/> YRS 4/> PARAM: CPT

## 2020-05-21 ENCOUNTER — TELEPHONE (OUTPATIENT)
Dept: PULMONOLOGY | Facility: MEDICAL CENTER | Age: 65
End: 2020-05-21

## 2020-05-21 DIAGNOSIS — G47.33 OBSTRUCTIVE SLEEP APNEA: Primary | ICD-10-CM

## 2020-12-15 ENCOUNTER — VBI (OUTPATIENT)
Dept: ADMINISTRATIVE | Facility: OTHER | Age: 65
End: 2020-12-15

## 2021-01-26 ENCOUNTER — APPOINTMENT (OUTPATIENT)
Dept: LAB | Facility: CLINIC | Age: 66
End: 2021-01-26
Payer: COMMERCIAL

## 2021-01-26 ENCOUNTER — OFFICE VISIT (OUTPATIENT)
Dept: FAMILY MEDICINE CLINIC | Facility: CLINIC | Age: 66
End: 2021-01-26
Payer: COMMERCIAL

## 2021-01-26 ENCOUNTER — VBI (OUTPATIENT)
Dept: ADMINISTRATIVE | Facility: OTHER | Age: 66
End: 2021-01-26

## 2021-01-26 VITALS
BODY MASS INDEX: 31.91 KG/M2 | WEIGHT: 152 LBS | RESPIRATION RATE: 16 BRPM | HEIGHT: 58 IN | SYSTOLIC BLOOD PRESSURE: 120 MMHG | TEMPERATURE: 98 F | HEART RATE: 83 BPM | OXYGEN SATURATION: 97 % | DIASTOLIC BLOOD PRESSURE: 64 MMHG

## 2021-01-26 DIAGNOSIS — E78.5 HYPERLIPIDEMIA, UNSPECIFIED HYPERLIPIDEMIA TYPE: ICD-10-CM

## 2021-01-26 DIAGNOSIS — R30.0 DYSURIA: ICD-10-CM

## 2021-01-26 DIAGNOSIS — Z78.0 POST-MENOPAUSAL: ICD-10-CM

## 2021-01-26 DIAGNOSIS — R53.83 OTHER FATIGUE: ICD-10-CM

## 2021-01-26 DIAGNOSIS — S32.019A CLOSED FRACTURE OF FIRST LUMBAR VERTEBRA, UNSPECIFIED FRACTURE MORPHOLOGY, INITIAL ENCOUNTER (HCC): ICD-10-CM

## 2021-01-26 DIAGNOSIS — Z12.31 SCREENING MAMMOGRAM, ENCOUNTER FOR: ICD-10-CM

## 2021-01-26 DIAGNOSIS — N95.2 VAGINAL ATROPHY: ICD-10-CM

## 2021-01-26 DIAGNOSIS — R73.01 IMPAIRED FASTING GLUCOSE: ICD-10-CM

## 2021-01-26 DIAGNOSIS — Z00.00 PHYSICAL EXAM: Primary | ICD-10-CM

## 2021-01-26 DIAGNOSIS — G47.33 OBSTRUCTIVE SLEEP APNEA: ICD-10-CM

## 2021-01-26 DIAGNOSIS — Z12.4 SCREENING FOR CERVICAL CANCER: ICD-10-CM

## 2021-01-26 LAB
ALBUMIN SERPL BCP-MCNC: 3.9 G/DL (ref 3.5–5)
ALP SERPL-CCNC: 103 U/L (ref 46–116)
ALT SERPL W P-5'-P-CCNC: 30 U/L (ref 12–78)
ANION GAP SERPL CALCULATED.3IONS-SCNC: 3 MMOL/L (ref 4–13)
AST SERPL W P-5'-P-CCNC: 18 U/L (ref 5–45)
BASOPHILS # BLD AUTO: 0.01 THOUSANDS/ΜL (ref 0–0.1)
BASOPHILS NFR BLD AUTO: 0 % (ref 0–1)
BILIRUB SERPL-MCNC: 0.3 MG/DL (ref 0.2–1)
BUN SERPL-MCNC: 16 MG/DL (ref 5–25)
CALCIUM SERPL-MCNC: 9.3 MG/DL (ref 8.3–10.1)
CHLORIDE SERPL-SCNC: 111 MMOL/L (ref 100–108)
CHOLEST SERPL-MCNC: 183 MG/DL (ref 50–200)
CO2 SERPL-SCNC: 28 MMOL/L (ref 21–32)
CREAT SERPL-MCNC: 0.58 MG/DL (ref 0.6–1.3)
EOSINOPHIL # BLD AUTO: 0.2 THOUSAND/ΜL (ref 0–0.61)
EOSINOPHIL NFR BLD AUTO: 3 % (ref 0–6)
ERYTHROCYTE [DISTWIDTH] IN BLOOD BY AUTOMATED COUNT: 13.2 % (ref 11.6–15.1)
EST. AVERAGE GLUCOSE BLD GHB EST-MCNC: 120 MG/DL
GFR SERPL CREATININE-BSD FRML MDRD: 98 ML/MIN/1.73SQ M
GLUCOSE P FAST SERPL-MCNC: 97 MG/DL (ref 65–99)
HBA1C MFR BLD: 5.8 %
HCT VFR BLD AUTO: 42.8 % (ref 34.8–46.1)
HDLC SERPL-MCNC: 47 MG/DL
HGB BLD-MCNC: 13.5 G/DL (ref 11.5–15.4)
IMM GRANULOCYTES # BLD AUTO: 0.01 THOUSAND/UL (ref 0–0.2)
IMM GRANULOCYTES NFR BLD AUTO: 0 % (ref 0–2)
LDLC SERPL CALC-MCNC: 108 MG/DL (ref 0–100)
LYMPHOCYTES # BLD AUTO: 2.06 THOUSANDS/ΜL (ref 0.6–4.47)
LYMPHOCYTES NFR BLD AUTO: 31 % (ref 14–44)
MCH RBC QN AUTO: 28.8 PG (ref 26.8–34.3)
MCHC RBC AUTO-ENTMCNC: 31.5 G/DL (ref 31.4–37.4)
MCV RBC AUTO: 92 FL (ref 82–98)
MONOCYTES # BLD AUTO: 0.38 THOUSAND/ΜL (ref 0.17–1.22)
MONOCYTES NFR BLD AUTO: 6 % (ref 4–12)
NEUTROPHILS # BLD AUTO: 4.1 THOUSANDS/ΜL (ref 1.85–7.62)
NEUTS SEG NFR BLD AUTO: 60 % (ref 43–75)
NONHDLC SERPL-MCNC: 136 MG/DL
NRBC BLD AUTO-RTO: 0 /100 WBCS
PLATELET # BLD AUTO: 214 THOUSANDS/UL (ref 149–390)
PMV BLD AUTO: 9.7 FL (ref 8.9–12.7)
POTASSIUM SERPL-SCNC: 4.5 MMOL/L (ref 3.5–5.3)
PROT SERPL-MCNC: 7.9 G/DL (ref 6.4–8.2)
RBC # BLD AUTO: 4.68 MILLION/UL (ref 3.81–5.12)
SL AMB  POCT GLUCOSE, UA: ABNORMAL
SL AMB LEUKOCYTE ESTERASE,UA: 75
SL AMB POCT BILIRUBIN,UA: ABNORMAL
SL AMB POCT BLOOD,UA: ABNORMAL
SL AMB POCT CLARITY,UA: CLEAR
SL AMB POCT COLOR,UA: YELLOW
SL AMB POCT KETONES,UA: 15
SL AMB POCT NITRITE,UA: ABNORMAL
SL AMB POCT PH,UA: 5
SL AMB POCT SPECIFIC GRAVITY,UA: 1.02
SL AMB POCT URINE PROTEIN: ABNORMAL
SL AMB POCT UROBILINOGEN: ABNORMAL
SODIUM SERPL-SCNC: 142 MMOL/L (ref 136–145)
TRIGL SERPL-MCNC: 139 MG/DL
TSH SERPL DL<=0.05 MIU/L-ACNC: 1.56 UIU/ML (ref 0.36–3.74)
VIT B12 SERPL-MCNC: 929 PG/ML (ref 100–900)
WBC # BLD AUTO: 6.76 THOUSAND/UL (ref 4.31–10.16)

## 2021-01-26 PROCEDURE — 80053 COMPREHEN METABOLIC PANEL: CPT

## 2021-01-26 PROCEDURE — 3725F SCREEN DEPRESSION PERFORMED: CPT | Performed by: FAMILY MEDICINE

## 2021-01-26 PROCEDURE — 82607 VITAMIN B-12: CPT

## 2021-01-26 PROCEDURE — 81002 URINALYSIS NONAUTO W/O SCOPE: CPT | Performed by: FAMILY MEDICINE

## 2021-01-26 PROCEDURE — 85025 COMPLETE CBC W/AUTO DIFF WBC: CPT

## 2021-01-26 PROCEDURE — 83036 HEMOGLOBIN GLYCOSYLATED A1C: CPT

## 2021-01-26 PROCEDURE — 99396 PREV VISIT EST AGE 40-64: CPT | Performed by: FAMILY MEDICINE

## 2021-01-26 PROCEDURE — 86038 ANTINUCLEAR ANTIBODIES: CPT

## 2021-01-26 PROCEDURE — 80061 LIPID PANEL: CPT

## 2021-01-26 PROCEDURE — 36415 COLL VENOUS BLD VENIPUNCTURE: CPT

## 2021-01-26 PROCEDURE — 1036F TOBACCO NON-USER: CPT | Performed by: FAMILY MEDICINE

## 2021-01-26 PROCEDURE — 84443 ASSAY THYROID STIM HORMONE: CPT

## 2021-01-26 PROCEDURE — 3008F BODY MASS INDEX DOCD: CPT | Performed by: FAMILY MEDICINE

## 2021-01-26 RX ORDER — CHOLECALCIFEROL (VITAMIN D3) 10 MCG
1 TABLET ORAL DAILY
COMMUNITY

## 2021-01-26 NOTE — PROGRESS NOTES
Keanu Glynn 1955 female MRN: [id number]    Health Maintenance Visit    ASSESSMENT/PLAN  Problem List Items Addressed This Visit        Endocrine    Impaired fasting glucose    Relevant Orders    Hemoglobin A1C       Respiratory    Obstructive sleep apnea       Musculoskeletal and Integument    Closed fracture of first lumbar vertebra (HCC)    Relevant Orders    DXA bone density spine hip and pelvis       Other    Fatigue    Relevant Orders    Vitamin B12    CBC and differential    Comprehensive metabolic panel    TSH, 3rd generation with Free T4 reflex    TABITHA Screen w/ Reflex to Titer/Pattern    Hyperlipidemia    Relevant Orders    Lipid panel      Other Visit Diagnoses     Physical exam    -  Primary    Dysuria        Relevant Orders    POCT urine dip (Completed)    Urine culture    Post-menopausal        Relevant Orders    DXA bone density spine hip and pelvis    Vaginal atrophy        Relevant Orders    Ambulatory referral to Urology    Screening mammogram, encounter for        Relevant Orders    Mammo screening bilateral w cad    Screening for cervical cancer        Relevant Orders    Thinprep Pap and HPV mRNA E6/E7            Most Recent Immunizations   Administered Date(s) Administered    Hep B, adult 06/13/2013    Influenza, seasonal, injectable 10/22/2013    Tdap 08/28/2013     Recommend testing B12 to see if def  Hx of fracture, till check DEXA  Start Vit D 2000 units  Repeat HLD  Sleep apnea might nee to be evaluated again  But machine didn't help her in the past    Fatigue? Unsure etiology  Dysuria: likely vaginal atrophy, send UC  No signs of yeast infection on exam    HPV with Pap sent    In addition to the above, the patient was counseled on general preventative health care subjects, including but not limited to:  - Nutrition, healthy weight, aerobic and weight-bearing exercise  - Mental health, social support, and self care    - Patient made aware of  services at the office  BMI Counseling: Body mass index is 31 77 kg/m²  The BMI is above normal  Nutrition recommendations include consuming healthier snacks  Future Appointments   Date Time Provider Cindy Nelson   3/1/2021  3:00 PM [facility] [address]   3/1/2021  3:30 PM Jamarcus [address]   7/27/2021  4:00 PM Solitario Davis MD [facility]            SUBJECTIVE    HPI:  Jose Luis eL is a 63 y o  female who presents for a routine health maintenance visit  Patient's main concern is that she is always chronic fatigue especially when she is sitting down she tends to fall asleep that is not normally how she is  Patient wants to be able to be active with her grandchildren  Taking supplements to help give her energy  Stop taking her vitamin-D  Started this morning with some vaginal irritation  Health Maintenance   Topic Date Due    Hepatitis C Screening  1955    Depression Remission PHQ  03/07/1969    HIV Screening  03/07/1972    Annual Physical  03/07/1975    Cervical Cancer Screening  09/10/2020    MAMMOGRAM  11/20/2020    BMI: Followup Plan  01/28/2021    Influenza Vaccine (1) 06/30/2021 (Originally 9/1/2020)    BMI: Adult  01/26/2022    DTaP,Tdap,and Td Vaccines (2 - Td) 08/28/2023    Colorectal Cancer Screening  12/11/2027    Pneumococcal Vaccine: Pediatrics (0 to 5 Years) and At-Risk Patients (6 to 59 Years)  Aged Out    HIB Vaccine  Aged Out    Hepatitis B Vaccine  Aged Out    IPV Vaccine  Aged Out    Hepatitis A Vaccine  Aged Out    Meningococcal ACWY Vaccine  Aged Out    HPV Vaccine  Aged Out         Review of Systems   Constitutional: Positive for fatigue  Negative for activity change, appetite change, chills and fever  HENT: Negative for congestion  Respiratory: Negative for cough, chest tightness and shortness of breath  Cardiovascular: Negative for chest pain and leg swelling     Gastrointestinal: Negative for abdominal distention, abdominal pain, constipation, diarrhea, nausea and vomiting  Genitourinary: Positive for vaginal pain (Discomfort)  All other systems reviewed and are negative        Historical Information   Past Medical History:   Diagnosis Date    Anxiety disorder 2009    last assessed 09    Chronic constipation 2004    last assessed 04    Gait disturbance 2012    last assessed 12    GERD (gastroesophageal reflux disease) 2007    last assessed 3/23/07    Primary localized osteoarthritis of hip 10/27/2011    last assessed 10/27/11    Thoracic neuritis 2007    last assessed 07     Past Surgical History:   Procedure Laterality Date     SECTION      CHOLECYSTECTOMY      DENTAL SURGERY       Family History   Problem Relation Age of Onset    Coronary artery disease Father     No Known Problems Sister     No Known Problems Mother     No Known Problems Maternal Grandmother     No Known Problems Paternal Grandmother     No Known Problems Sister     No Known Problems Sister     No Known Problems Sister     Esophageal cancer Paternal Aunt      Social History     Socioeconomic History    Marital status: /Civil Union     Spouse name: Not on file    Number of children: Not on file    Years of education: Not on file    Highest education level: Not on file   Occupational History    Not on file   Social Needs    Financial resource strain: Not on file    Food insecurity     Worry: Not on file     Inability: Not on file   [employer] needs     Medical: Not on file     Non-medical: Not on file   Tobacco Use    Smoking status: Former Smoker     Packs/day: 1 00     Years: 15 00     Pack years: 15 00     Quit date: 1986     Years since quittin 9    Smokeless tobacco: Never Used   Substance and Sexual Activity    Alcohol use: Yes     Comment: occ wine / occasional     Drug use: No    Sexual activity: Not on file   Lifestyle    Physical activity     Days per week: Not on file     Minutes per session: Not on file    Stress: Not on file   Relationships    Social connections     Talks on phone: Not on file     Gets together: Not on file     Attends [employer] service: Not on file     Active member of club or organization: Not on file     Attends meetings of clubs or organizations: Not on file     Relationship status: Not on file    Intimate partner violence     Fear of current or ex partner: Not on file     Emotionally abused: Not on file     Physically abused: Not on file     Forced sexual activity: Not on file   Other Topics Concern    Not on file   Social History Narrative    Caffeine use          Medications:    Current Outpatient Medications:     b complex-vitamin C-folic acid (NEPHROCAPS) 1 mg capsule, Take 1 capsule by mouth daily, Disp: , Rfl:     LYSINE PO, Take by mouth, Disp: , Rfl:     pantoprazole (PROTONIX) 20 mg tablet, , Disp: , Rfl: 0    cholecalciferol (VITAMIN D3) 1,000 units tablet, Take 1,000 Units by mouth daily, Disp: , Rfl:     Allergies   Allergen Reactions    Ciprofloxacin Vomiting     Reaction Date: 25Mar2010;        OBJECTIVE  Vitals:   Vitals:    01/26/21 0925   BP: 120/64   BP Location: Right arm   Patient Position: Sitting   Cuff Size: Standard   Pulse: 83   Resp: 16   Temp: 98 °F (36 7 °C)   TempSrc: Temporal   SpO2: 97%   Weight: 68 9 kg (152 lb)   Height: 4' 10" (1 473 m)     Physical Exam  Vitals signs reviewed  Constitutional:       Appearance: She is well-developed  HENT:      Head: Normocephalic and atraumatic  Eyes:      Conjunctiva/sclera: Conjunctivae normal       Pupils: Pupils are equal, round, and reactive to light  Neck:      Musculoskeletal: Normal range of motion and neck supple  Cardiovascular:      Rate and Rhythm: Normal rate and regular rhythm  Heart sounds: Normal heart sounds  Pulmonary:      Effort: Pulmonary effort is normal  No respiratory distress        Breath sounds: Normal breath sounds  Chest:      Breasts:         Right: No inverted nipple, mass, nipple discharge, skin change or tenderness  Left: No inverted nipple, mass, nipple discharge, skin change or tenderness  Abdominal:      Hernia: There is no hernia in the left inguinal area  Genitourinary:     Labia:         Right: No rash, tenderness, lesion or injury  Left: No rash, tenderness, lesion or injury  Vagina: Normal       Cervix: No cervical motion tenderness, discharge or friability  Adnexa:         Right: No mass or tenderness  Left: No mass or tenderness  Rectum: No external hemorrhoid  Musculoskeletal: Normal range of motion  Skin:     General: Skin is warm and dry  Capillary Refill: Capillary refill takes less than 2 seconds  Neurological:      Mental Status: She is alert and oriented to person, place, and time  Psychiatric:         Behavior: Behavior normal          Thought Content:  Thought content normal          Judgment: Judgment normal

## 2021-01-27 LAB — RYE IGE QN: NEGATIVE

## 2021-01-28 LAB
BACTERIA UR CULT: NORMAL
Lab: NO GROWTH

## 2021-01-29 LAB
CYTOLOGIST CVX/VAG CYTO: NORMAL
DX ICD CODE: NORMAL
HPV I/H RISK 4 DNA CVX QL PROBE+SIG AMP: NEGATIVE
OTHER STN SPEC: NORMAL
PATH REPORT.FINAL DX SPEC: NORMAL
SL AMB NOTE:: NORMAL
SL AMB SPECIMEN ADEQUACY: NORMAL
SL AMB TEST METHODOLOGY: NORMAL

## 2021-02-01 DIAGNOSIS — R53.82 CHRONIC FATIGUE: Primary | ICD-10-CM

## 2021-03-01 ENCOUNTER — HOSPITAL ENCOUNTER (OUTPATIENT)
Dept: RADIOLOGY | Facility: HOSPITAL | Age: 66
Discharge: HOME | End: 2021-03-01
Attending: FAMILY MEDICINE
Payer: COMMERCIAL

## 2021-03-01 DIAGNOSIS — Z12.31 SCREENING MAMMOGRAM, ENCOUNTER FOR: ICD-10-CM

## 2021-03-01 DIAGNOSIS — S32.019A CLOSED FRACTURE OF FIRST LUMBAR VERTEBRA, UNSPECIFIED FRACTURE MORPHOLOGY, INITIAL ENCOUNTER (HCC): ICD-10-CM

## 2021-03-01 DIAGNOSIS — Z78.0 POST-MENOPAUSAL: ICD-10-CM

## 2021-03-01 PROCEDURE — 77067 SCR MAMMO BI INCL CAD: CPT

## 2021-03-01 PROCEDURE — 77063 BREAST TOMOSYNTHESIS BI: CPT

## 2021-03-01 PROCEDURE — 77080 DXA BONE DENSITY AXIAL: CPT

## 2021-03-02 ENCOUNTER — TELEPHONE (OUTPATIENT)
Dept: FAMILY MEDICINE CLINIC | Facility: CLINIC | Age: 66
End: 2021-03-02

## 2021-03-02 NOTE — TELEPHONE ENCOUNTER
----- Message from Yohana Rodriguez MD sent at 3/2/2021 11:12 AM EST -----  Mammogram is normal repeat in 1 year

## 2021-04-09 ENCOUNTER — OFFICE VISIT (OUTPATIENT)
Dept: FAMILY MEDICINE CLINIC | Facility: CLINIC | Age: 66
End: 2021-04-09
Payer: COMMERCIAL

## 2021-04-09 VITALS
TEMPERATURE: 98 F | HEIGHT: 58 IN | WEIGHT: 145.6 LBS | DIASTOLIC BLOOD PRESSURE: 78 MMHG | SYSTOLIC BLOOD PRESSURE: 128 MMHG | OXYGEN SATURATION: 97 % | RESPIRATION RATE: 15 BRPM | HEART RATE: 61 BPM | BODY MASS INDEX: 30.56 KG/M2

## 2021-04-09 DIAGNOSIS — M54.41 ACUTE RIGHT-SIDED LOW BACK PAIN WITH RIGHT-SIDED SCIATICA: Primary | ICD-10-CM

## 2021-04-09 DIAGNOSIS — M77.30 HEEL SPUR, UNSPECIFIED LATERALITY: ICD-10-CM

## 2021-04-09 PROCEDURE — 99213 OFFICE O/P EST LOW 20 MIN: CPT | Performed by: FAMILY MEDICINE

## 2021-04-09 PROCEDURE — 3008F BODY MASS INDEX DOCD: CPT | Performed by: FAMILY MEDICINE

## 2021-04-09 PROCEDURE — 1036F TOBACCO NON-USER: CPT | Performed by: FAMILY MEDICINE

## 2021-04-09 RX ORDER — NAPROXEN 500 MG/1
500 TABLET ORAL 2 TIMES DAILY WITH MEALS
Qty: 60 TABLET | Refills: 0 | Status: SHIPPED | OUTPATIENT
Start: 2021-04-09 | End: 2021-07-01

## 2021-04-09 NOTE — PROGRESS NOTES
Assessment/Plan:    1  Acute right-sided low back pain with right-sided sciatica  -     naproxen (NAPROSYN) 500 mg tablet; Take 1 tablet (500 mg total) by mouth 2 (two) times a day with meals    2  Heel spur, unspecified laterality  -     Ambulatory referral to Podiatry; Future    back pain: naproxen given side effects and precautions reviewed  Patient would like to try to see chiropractor  If no improvement, patient will try physical therapy  Precautions reviewed  Return in about 1 month (around 5/9/2021)  Subjective:      Patient ID: Ankita Murillo is a 61 y o  female  Chief Complaint   Patient presents with    Pain     leg, from low back right side all the way down the leg       HPI  62 y/o female presents with right leg pain for the past 1 month  Pain from sacral region to the legs  Pain mostly in the lower thigh towards knee  6/10 in severity of pain that is dull  No loss of bowel or bladder  No numbness or tingling  Has tried OTC tylenol and advil  She also tried some medication over the counter that helps with pain and helps with sleep  Not helping  Patient states she wants to see chiropractor and wants to make sure its okay  Denies any chest pain or shortness of breath  Patient would like to see podiatry for heel spurs  No bowel or bladder incontinence  The following portions of the patient's history were reviewed and updated as appropriate: allergies, current medications, past family history, past medical history, past social history, past surgical history and problem list     Review of Systems   Constitutional: Negative for appetite change and fever  HENT: Negative for ear pain and sore throat  Eyes: Negative for visual disturbance  Respiratory: Negative for shortness of breath  Cardiovascular: Negative for chest pain and leg swelling  Gastrointestinal: Negative for abdominal pain, diarrhea, nausea and vomiting  Musculoskeletal: Positive for back pain     Skin: Negative for color change  Neurological: Negative for dizziness, light-headedness and headaches  Psychiatric/Behavioral: Negative for agitation and behavioral problems  Current Outpatient Medications   Medication Sig Dispense Refill    b complex-vitamin C-folic acid (NEPHROCAPS) 1 mg capsule Take 1 capsule by mouth daily      cholecalciferol (VITAMIN D3) 1,000 units tablet Take 1,000 Units by mouth daily      LYSINE PO Take by mouth      pantoprazole (PROTONIX) 20 mg tablet   0    naproxen (NAPROSYN) 500 mg tablet Take 1 tablet (500 mg total) by mouth 2 (two) times a day with meals 60 tablet 0     No current facility-administered medications for this visit  Objective:    /78 (BP Location: Right arm, Patient Position: Sitting, Cuff Size: Standard)   Pulse 61   Temp 98 °F (36 7 °C) (Temporal)   Resp 15   Ht 4' 10" (1 473 m)   Wt 66 kg (145 lb 9 6 oz)   SpO2 97%   BMI 30 43 kg/m²        Physical Exam  Constitutional:       General: She is not in acute distress  Appearance: She is well-developed  HENT:      Head: Normocephalic and atraumatic  Right Ear: External ear normal       Left Ear: External ear normal       Nose: Nose normal       Mouth/Throat:      Pharynx: No oropharyngeal exudate  Eyes:      General:         Right eye: No discharge  Left eye: No discharge  Conjunctiva/sclera: Conjunctivae normal    Neck:      Musculoskeletal: Normal range of motion and neck supple  Cardiovascular:      Rate and Rhythm: Normal rate and regular rhythm  Heart sounds: Normal heart sounds  No murmur  Pulmonary:      Effort: Pulmonary effort is normal  No respiratory distress  Breath sounds: Normal breath sounds  Abdominal:      General: Bowel sounds are normal  There is no distension  Palpations: Abdomen is soft  Tenderness: There is no abdominal tenderness  Musculoskeletal: Normal range of motion        Comments: Straight leg test negative Sensation and strength intact  Pulse present      Skin:     General: Skin is warm  Neurological:      Mental Status: She is alert and oriented to person, place, and time     Psychiatric:         Mood and Affect: Mood normal          Behavior: Behavior normal                 Breann Herrera MD

## 2021-06-12 NOTE — PROGRESS NOTES
Assessment and Plan:   Ms Ha Bolivar is a 74-year-old female with history significant for obstructive sleep apnea (not being treated currently), vitamin-D insufficiency, lumbar degenerative arthritis and osteopenia, who presents for further evaluation of chronic fatigue  She is referred by Dr Velvet Campo for a rheumatology consult  Cherylene Mercy presents today for further evaluation of chronic fatigue she has been experiencing over the past 1-2 years  She does not describe any additional symptoms with the fatigue and this would lower my suspicion for an underlying rheumatological etiology to her presentation  I suspect most likely this may be secondary to untreated obstructive sleep apnea and I recommend she reestablish with the sleep clinic to look into alternate options for treatment  Plan:  Diagnoses and all orders for this visit:    Chronic fatigue  -     Vitamin D 25 hydroxy; Future  -     Ferritin; Future  -     Ambulatory referral to Rheumatology    Obstructive sleep apnea    Vitamin D deficiency  -     Vitamin D 25 hydroxy; Future    Osteoarthritis of lumbar spine, unspecified spinal osteoarthritis complication status    Osteopenia, unspecified location      I have personally reviewed pertinent films in PACS of the lumbar spine XR which shows mild degenerative changes  Activities as tolerated  Exercise: try to maintain a low impact exercise regimen as much as possible  Walk for 30 minutes a day for at least 3 days a week  Continue other medications as prescribed by PCP and other specialists  RTC PRN  HPI  Ms Ha Bolivar is a 74-year-old female with history significant for obstructive sleep apnea (not being treated currently), vitamin-D insufficiency, lumbar degenerative arthritis and osteopenia, who presents for further evaluation of chronic fatigue  She is referred by Dr Velvet Campo for a rheumatology consult        Patient reports for the past 1-2 years she has been feeling fatigued to the point of extreme exhaustion  She reports that she is able to sleep well at night and thinks that she wakes up feeling refreshed, and is generally able to do her activities during the day but any time she stops to rest or if she is playing with her grandchildren she feels a sudden wave of fatigue that exhausts her  There has been no change with her sleep habits, lifestyle or any new medical conditions diagnosed within this time period  She does have a history of obstructive sleep apnea but was not compliant with a CPAP as it was uncomfortable and did not fit properly  She is currently not being treated for the sleep apnea  She does not report any associated symptoms with the fatigue such as fevers, unintentional weight loss, dry eyes, dry mouth, inflammatory eye disease, skin rash, psoriasis, mouth/nose ulcers, swollen glands, chest pain, shortness of breath, inflammatory bowel disease, blood clots, Raynaud's, joint pain/swelling/stiffness (except for chronic low back pain) or a family history of autoimmune disease  She was seen by her primary care physician for this and had recent testing done which showed a normal CBC, CMP, TABITHA, vitamin B12 and TSH  A Vitamin D level in 12/2019 was borderline low at 24 8  The following portions of the patient's history were reviewed and updated as appropriate: allergies, current medications, past family history, past medical history, past social history, past surgical history and problem list       Review of Systems  Constitutional: Negative for weight change, fevers, chills, night sweats  Positive for fatigue  ENT/Mouth: Negative for hearing changes, ear pain, nasal congestion, sinus pain, hoarseness, sore throat, rhinorrhea, swallowing difficulty  Eyes: Negative for pain, redness, discharge, vision changes  Cardiovascular: Negative for chest pain, SOB, palpitations  Respiratory: Negative for cough, sputum, wheezing, dyspnea     Gastrointestinal: Negative for nausea, vomiting, diarrhea, constipation, pain, heartburn  Genitourinary: Negative for dysuria, urinary frequency, hematuria  Musculoskeletal: As per HPI  Skin: Negative for skin rash, color changes  Neuro: Negative for weakness, numbness, tingling, loss of consciousness  Psych: Negative for anxiety, depression  Heme/Lymph: Negative for easy bruising, bleeding, lymphadenopathy  Past Medical History:   Diagnosis Date    Anxiety disorder 2009    last assessed 09    Chronic constipation 2004    last assessed 04    Gait disturbance 2012    last assessed 12    GERD (gastroesophageal reflux disease) 2007    last assessed 3/23/07    Primary localized osteoarthritis of hip 10/27/2011    last assessed 10/27/11    Thoracic neuritis 2007    last assessed 07       Past Surgical History:   Procedure Laterality Date     SECTION      CHOLECYSTECTOMY      DENTAL SURGERY         Social History     Socioeconomic History    Marital status: /Civil Union     Spouse name: Not on file    Number of children: Not on file    Years of education: Not on file    Highest education level: Not on file   Occupational History    Not on file   Tobacco Use    Smoking status: Former Smoker     Packs/day: 1 00     Years: 15 00     Pack years: 15 00     Quit date: 1986     Years since quittin 3    Smokeless tobacco: Never Used   Substance and Sexual Activity    Alcohol use: Yes     Comment: occ wine / occasional     Drug use: No    Sexual activity: Not on file   Other Topics Concern    Not on file   Social History Narrative    Caffeine use      Social Determinants of Health     Financial Resource Strain:     Difficulty of Paying Living Expenses:    Food Insecurity:     Worried About Running Out of Food in the Last Year:     Ran Out of Food in the Last Year:    Transportation Needs:     Lack of Transportation (Medical):      Lack of Transportation (Non-Medical):    Physical Activity:     Days of Exercise per Week:     Minutes of Exercise per Session:    Stress:     Feeling of Stress :    Social Connections:     Frequency of Communication with Friends and Family:     Frequency of Social Gatherings with Friends and Family:     Attends Temple Services:     Active Member of Clubs or Organizations:     Attends Club or Organization Meetings:     Marital Status:    Intimate Partner Violence:     Fear of Current or Ex-Partner:     Emotionally Abused:     Physically Abused:     Sexually Abused:        Family History   Problem Relation Age of Onset    Coronary artery disease Father     No Known Problems Sister     No Known Problems Mother     No Known Problems Maternal Grandmother     No Known Problems Paternal Grandmother     No Known Problems Sister     No Known Problems Sister     No Known Problems Sister     Esophageal cancer Paternal Aunt        Allergies   Allergen Reactions    Ciprofloxacin Vomiting     Reaction Date: 25Mar2010;        Current Outpatient Medications:     b complex-vitamin C-folic acid (NEPHROCAPS) 1 mg capsule, Take 1 capsule by mouth daily, Disp: , Rfl:     cholecalciferol (VITAMIN D3) 1,000 units tablet, Take 1,000 Units by mouth daily, Disp: , Rfl:     LYSINE PO, Take by mouth, Disp: , Rfl:     naproxen (NAPROSYN) 500 mg tablet, Take 1 tablet (500 mg total) by mouth 2 (two) times a day with meals, Disp: 60 tablet, Rfl: 0    pantoprazole (PROTONIX) 20 mg tablet, , Disp: , Rfl: 0      Objective:    Vitals:    06/14/21 1345   BP: 118/76   BP Location: Left arm   Patient Position: Sitting   Cuff Size: Standard   Pulse: (!) 54   Temp: 98 6 °F (37 °C)   Weight: 60 3 kg (133 lb)       Physical Exam  General: Well appearing, well nourished, in no distress  Oriented x 3, normal mood and affect  Ambulating without difficulty  Skin: Good turgor, no rash, unusual bruising or prominent lesions    Hair: Normal texture and distribution  Nails: Normal color, no deformities  HEENT:  Head: Normocephalic, atraumatic  Eyes: Conjunctiva clear, sclera non-icteric, EOM intact  Extremities: No amputations or deformities, cyanosis, edema  Musculoskeletal: No swelling visualized  Neurologic: Alert and oriented  No focal neurological deficits appreciated  Psychiatric: Normal mood and affect  JESSICA Goetz    Rheumatology

## 2021-06-14 ENCOUNTER — CONSULT (OUTPATIENT)
Dept: RHEUMATOLOGY | Facility: CLINIC | Age: 66
End: 2021-06-14
Payer: COMMERCIAL

## 2021-06-14 VITALS
BODY MASS INDEX: 27.8 KG/M2 | WEIGHT: 133 LBS | SYSTOLIC BLOOD PRESSURE: 118 MMHG | DIASTOLIC BLOOD PRESSURE: 76 MMHG | TEMPERATURE: 98.6 F | HEART RATE: 54 BPM

## 2021-06-14 DIAGNOSIS — M47.816 OSTEOARTHRITIS OF LUMBAR SPINE, UNSPECIFIED SPINAL OSTEOARTHRITIS COMPLICATION STATUS: ICD-10-CM

## 2021-06-14 DIAGNOSIS — E55.9 VITAMIN D DEFICIENCY: ICD-10-CM

## 2021-06-14 DIAGNOSIS — M85.80 OSTEOPENIA, UNSPECIFIED LOCATION: ICD-10-CM

## 2021-06-14 DIAGNOSIS — G47.33 OBSTRUCTIVE SLEEP APNEA: ICD-10-CM

## 2021-06-14 DIAGNOSIS — R53.82 CHRONIC FATIGUE: Primary | ICD-10-CM

## 2021-06-14 PROCEDURE — 99205 OFFICE O/P NEW HI 60 MIN: CPT | Performed by: INTERNAL MEDICINE

## 2021-06-15 ENCOUNTER — LAB (OUTPATIENT)
Dept: LAB | Facility: CLINIC | Age: 66
End: 2021-06-15
Payer: COMMERCIAL

## 2021-06-15 DIAGNOSIS — R53.82 CHRONIC FATIGUE: ICD-10-CM

## 2021-06-15 DIAGNOSIS — E55.9 VITAMIN D DEFICIENCY: ICD-10-CM

## 2021-06-15 LAB
25(OH)D3 SERPL-MCNC: 58 NG/ML (ref 30–100)
FERRITIN SERPL-MCNC: 47 NG/ML (ref 8–388)

## 2021-06-15 PROCEDURE — 82306 VITAMIN D 25 HYDROXY: CPT

## 2021-06-15 PROCEDURE — 82728 ASSAY OF FERRITIN: CPT

## 2021-06-15 PROCEDURE — 36415 COLL VENOUS BLD VENIPUNCTURE: CPT

## 2021-06-26 ENCOUNTER — TELEMEDICINE (OUTPATIENT)
Dept: FAMILY MEDICINE CLINIC | Facility: CLINIC | Age: 66
End: 2021-06-26
Payer: COMMERCIAL

## 2021-06-26 DIAGNOSIS — Z11.59 ENCOUNTER FOR SCREENING FOR OTHER VIRAL DISEASES: ICD-10-CM

## 2021-06-26 DIAGNOSIS — J06.9 ACUTE URI: Primary | ICD-10-CM

## 2021-06-26 PROCEDURE — U0003 INFECTIOUS AGENT DETECTION BY NUCLEIC ACID (DNA OR RNA); SEVERE ACUTE RESPIRATORY SYNDROME CORONAVIRUS 2 (SARS-COV-2) (CORONAVIRUS DISEASE [COVID-19]), AMPLIFIED PROBE TECHNIQUE, MAKING USE OF HIGH THROUGHPUT TECHNOLOGIES AS DESCRIBED BY CMS-2020-01-R: HCPCS | Performed by: NURSE PRACTITIONER

## 2021-06-26 PROCEDURE — 99441 PR PHYS/QHP TELEPHONE EVALUATION 5-10 MIN: CPT | Performed by: NURSE PRACTITIONER

## 2021-06-26 PROCEDURE — U0005 INFEC AGEN DETEC AMPLI PROBE: HCPCS | Performed by: NURSE PRACTITIONER

## 2021-06-26 NOTE — PROGRESS NOTES
Virtual Brief Visit    Assessment/Plan:    Problem List Items Addressed This Visit     None      Visit Diagnoses     Acute URI    -  Primary    Relevant Orders    Novel Coronavirus (COVID-19), PCR [facility] Collected at [facility]    Encounter for screening for other viral diseases        Relevant Orders    Novel Coronavirus (COVID-19), PCR [facility] Collected at [facility]      The case discussed with patient using patient centered shared decision making  The patient was counseled regarding instructions for management,-- risk factor reductions,-- prognosis,-- impressions,-- risks and benefits of treatment options,-- importance of compliance with treatment  I have reviewed the instructions with the patient, answering all questions to her satisfaction  Must r/o COVID in view of possible exposure while in public UNMASKED (frequently) in unvaccinated pt  Supportive care as advised    F/u on monday          Reason for visit is   Chief Complaint   Patient presents with    Virtual Brief Visit    Virtual Regular Visit        Encounter provider XAVIER De Anda    Provider located at [address] [place] [id number]    Recent Visits  No visits were found meeting these conditions  Showing recent visits within past 7 days and meeting all other requirements  Today's Visits  Date Type Provider Dept   06/26/21 Telemedicine Serge De Anda 52 today's visits and meeting all other requirements  Future Appointments  No visits were found meeting these conditions  Showing future appointments within next 150 days and meeting all other requirements     It was my intent to perform this visit via video technology but the patient was not able to do a video connection so the visit was completed via audio telephone only  After connecting through telephone, the patient was identified by name and date of birth  Jonas Byrd was informed that this is a telemedicine visit and that the visit is being conducted through telephone  My office door was closed  No one else was in the room  She acknowledged consent and understanding of privacy and security of the platform  The patient has agreed to participate and understands she can discontinue the visit at any time  Patient is aware this is a billable service  Subjective    Jonas Byrd is a 61 y o  female who presents with flu like sxs  NOT VACCINATED>  NOT MASKING WHEN IN PUBLIC  URI   This is a new problem  Episode onset: started th night  The problem has been waxing and waning  There has been no fever  Associated symptoms include congestion, coughing, headaches, rhinorrhea, sinus pain, sneezing and a sore throat  Pertinent negatives include no abdominal pain  She has tried nothing for the symptoms  The treatment provided no relief          Past Medical History:   Diagnosis Date    Anxiety disorder 2009    last assessed 09    Chronic constipation 2004    last assessed 04    Gait disturbance 2012    last assessed 12    GERD (gastroesophageal reflux disease) 2007    last assessed 3/23/07    Primary localized osteoarthritis of hip 10/27/2011    last assessed 10/27/11    Thoracic neuritis 2007    last assessed 07       Past Surgical History:   Procedure Laterality Date     SECTION      CHOLECYSTECTOMY      DENTAL SURGERY         Current Outpatient Medications   Medication Sig Dispense Refill    b complex-vitamin C-folic acid (NEPHROCAPS) 1 mg capsule Take 1 capsule by mouth daily      cholecalciferol (VITAMIN D3) 1,000 units tablet Take 1,000 Units by mouth daily      LYSINE PO Take by mouth      naproxen (NAPROSYN) 500 mg tablet Take 1 tablet (500 mg total) by mouth 2 (two) times a day with meals 60 tablet 0    pantoprazole (PROTONIX) 20 mg tablet   0     No current facility-administered medications for this visit  Allergies   Allergen Reactions    Ciprofloxacin Vomiting     Reaction Date: [id number];        Review of Systems   Constitutional: Positive for fatigue and fever  HENT: Positive for congestion, rhinorrhea, sinus pain, sneezing and sore throat  Respiratory: Positive for cough  Negative for shortness of breath  Cardiovascular: Negative  Gastrointestinal: Negative for abdominal pain  Neurological: Positive for weakness and headaches  There were no vitals filed for this visit  I spent 5 minutes directly with the patient during this visit    VIRTUAL VISIT [address] acknowledges that she has consented to an online visit or consultation  She understands that the online visit is based solely on information provided by her, and that, in the absence of a face-to-face physical evaluation by the physician, the diagnosis she receives is both limited and provisional in terms of accuracy and completeness  This is not intended to replace a full medical face-to-face evaluation by the physician  Jefferson Perez understands and accepts these terms

## 2021-06-28 ENCOUNTER — TELEPHONE (OUTPATIENT)
Dept: FAMILY MEDICINE CLINIC | Facility: CLINIC | Age: 66
End: 2021-06-28

## 2021-06-28 PROCEDURE — U0005 INFEC AGEN DETEC AMPLI PROBE: HCPCS | Performed by: FAMILY MEDICINE

## 2021-06-28 PROCEDURE — U0003 INFECTIOUS AGENT DETECTION BY NUCLEIC ACID (DNA OR RNA); SEVERE ACUTE RESPIRATORY SYNDROME CORONAVIRUS 2 (SARS-COV-2) (CORONAVIRUS DISEASE [COVID-19]), AMPLIFIED PROBE TECHNIQUE, MAKING USE OF HIGH THROUGHPUT TECHNOLOGIES AS DESCRIBED BY CMS-2020-01-R: HCPCS | Performed by: FAMILY MEDICINE

## 2021-06-28 NOTE — TELEPHONE ENCOUNTER
Pt advised covid negative, she wants to see the doctor still feels congested, transferred to front schedule appt

## 2021-06-28 NOTE — TELEPHONE ENCOUNTER
----- Message from [facility] sent at 6/27/2021  9:07 PM EDT -----  Please advise covid testing is negative ty

## 2021-06-28 NOTE — TELEPHONE ENCOUNTER
Patient had virtual with Flor Mitchell 6/26  Negative covid test 6/26  Patient requesting appointment in office  She is still congested/cough, no sense of smell, she had 100 fever yesterday  Please advise

## 2021-06-28 NOTE — TELEPHONE ENCOUNTER
Sent patient for retesting, given that she has no smell that started this morning  Zpack sent in given fevers, and URI symptoms  Wendesday repeat eval; we will schedule appointment once I get results

## 2021-06-29 ENCOUNTER — TELEPHONE (OUTPATIENT)
Dept: FAMILY MEDICINE CLINIC | Facility: CLINIC | Age: 66
End: 2021-06-29

## 2021-06-29 NOTE — TELEPHONE ENCOUNTER
----- Message from Clive Oreilly MD sent at 6/29/2021 10:59 AM EDT -----  Please advise patient that COVID is negative  Would like her to be on abx for at least 48hrs and then see her Thursday for a recheck

## 2021-07-01 ENCOUNTER — OFFICE VISIT (OUTPATIENT)
Dept: FAMILY MEDICINE CLINIC | Facility: CLINIC | Age: 66
End: 2021-07-01
Payer: COMMERCIAL

## 2021-07-01 ENCOUNTER — APPOINTMENT (OUTPATIENT)
Dept: RADIOLOGY | Facility: CLINIC | Age: 66
End: 2021-07-01
Payer: COMMERCIAL

## 2021-07-01 VITALS
DIASTOLIC BLOOD PRESSURE: 64 MMHG | TEMPERATURE: 98.4 F | OXYGEN SATURATION: 97 % | SYSTOLIC BLOOD PRESSURE: 96 MMHG | RESPIRATION RATE: 16 BRPM | BODY MASS INDEX: 27.16 KG/M2 | HEART RATE: 65 BPM | WEIGHT: 129.4 LBS | HEIGHT: 58 IN

## 2021-07-01 DIAGNOSIS — R09.89 RHONCHI AT RIGHT LUNG BASE: ICD-10-CM

## 2021-07-01 DIAGNOSIS — J40 BRONCHITIS: ICD-10-CM

## 2021-07-01 DIAGNOSIS — J40 BRONCHITIS: Primary | ICD-10-CM

## 2021-07-01 PROCEDURE — 3008F BODY MASS INDEX DOCD: CPT | Performed by: FAMILY MEDICINE

## 2021-07-01 PROCEDURE — 99213 OFFICE O/P EST LOW 20 MIN: CPT | Performed by: FAMILY MEDICINE

## 2021-07-01 PROCEDURE — 1036F TOBACCO NON-USER: CPT | Performed by: FAMILY MEDICINE

## 2021-07-01 PROCEDURE — 71046 X-RAY EXAM CHEST 2 VIEWS: CPT

## 2021-07-01 RX ORDER — METHYLPREDNISOLONE 4 MG/1
TABLET ORAL
Qty: 21 EACH | Refills: 0 | Status: SHIPPED | OUTPATIENT
Start: 2021-07-01 | End: 2021-08-02

## 2021-07-01 RX ORDER — ALBUTEROL SULFATE 90 UG/1
2 AEROSOL, METERED RESPIRATORY (INHALATION) EVERY 6 HOURS PRN
Qty: 6.7 G | Refills: 5 | Status: SHIPPED | OUTPATIENT
Start: 2021-07-01 | End: 2022-02-09

## 2021-07-01 NOTE — PROGRESS NOTES
Andrew Angel 1955 female MRN: [id number]    FAMILY PRACTICE OFFICE VISIT  [facility] - [address]      ASSESSMENT/PLAN  Andrew Angel is a 61 y o  female presents to the office for    Diagnoses and all orders for this visit:    Bronchitis  -     methylPREDNISolone 4 MG tablet therapy pack; Use as directed on package  -     albuterol (Proventil HFA) 90 mcg/act inhaler; Inhale 2 puffs every 6 (six) hours as needed for wheezing  -     XR chest pa & lateral; Future    Rhonchi at right lung base  -     albuterol (Proventil HFA) 90 mcg/act inhaler; Inhale 2 puffs every 6 (six) hours as needed for wheezing  -     XR chest pa & lateral; Future        would like to perform a pulmonary function test after this acute phase is over  Patient placed on a Medrol pack/ albuterol/chest x-ray to rule out pneumonia   education given on the course of bronchitis         Future Appointments   Date Time Provider Cindy Nelson   7/27/2021  4:00 PM Cephus Meigs, MD [facility]          SUBJECTIVE  CC: Follow-up (patient here to follow up cough, feels better but still has cough) and Cough      HPI:  Andrew Angel is a 61 y o  female who presents for  And acute appointment  Patient was seen on June 26 virtually  Patient congestion, cough, headaches, sinus pressure sinus pain/sore throat  Patient had 2 COVID test which were negative  Patient states that overall her symptoms have improved but her cough is significantly worsen  Patient states that her granddaughter's also ill      Review of Systems   Constitutional: Positive for fatigue  Negative for activity change, appetite change, chills and fever  HENT: Negative for congestion  Respiratory: Positive for cough  Negative for chest tightness and shortness of breath  Cardiovascular: Negative for chest pain and leg swelling     Gastrointestinal: Negative for abdominal distention, abdominal pain, constipation, diarrhea, nausea and vomiting  All other systems reviewed and are negative  Historical Information   The patient history was reviewed as follows:  Past Medical History:   Diagnosis Date    Anxiety disorder 01/27/2009    last assessed 1/27/09    Chronic constipation 04/22/2004    last assessed 4/22/04    Gait disturbance 07/12/2012    last assessed 7/12/12    GERD (gastroesophageal reflux disease) 03/23/2007    last assessed 3/23/07    Primary localized osteoarthritis of hip 10/27/2011    last assessed 10/27/11    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    last assessed 11/14/07         Medications:     Current Outpatient Medications:     azithromycin (ZITHROMAX) 250 mg tablet, Take 2 tablets today then 1 tablet daily x 4 days, Disp: 6 tablet, Rfl: 0    b complex-vitamin C-folic acid (NEPHROCAPS) 1 mg capsule, Take 1 capsule by mouth daily, Disp: , Rfl:     cholecalciferol (VITAMIN D3) 1,000 units tablet, Take 1,000 Units by mouth daily, Disp: , Rfl:     LYSINE PO, Take by mouth, Disp: , Rfl:     naproxen (NAPROSYN) 500 mg tablet, Take 1 tablet (500 mg total) by mouth 2 (two) times a day with meals, Disp: 60 tablet, Rfl: 0    pantoprazole (PROTONIX) 20 mg tablet, , Disp: , Rfl: 0    Allergies   Allergen Reactions    Ciprofloxacin Vomiting     Reaction Date: 25Mar2010;        OBJECTIVE  Vitals:   Vitals:    07/01/21 1711   BP: 96/64   BP Location: Left arm   Patient Position: Sitting   Cuff Size: Standard   Pulse: 65   Resp: 16   Temp: 98 4 °F (36 9 °C)   TempSrc: Temporal   SpO2: 97%   Weight: 58 7 kg (129 lb 6 4 oz)   Height: 4' 10" (1 473 m)         Physical Exam  Vitals reviewed  Constitutional:       Appearance: She is well-developed  HENT:      Head: Normocephalic and atraumatic  Eyes:      Conjunctiva/sclera: Conjunctivae normal       Pupils: Pupils are equal, round, and reactive to light  Cardiovascular:      Rate and Rhythm: Normal rate and regular rhythm  Heart sounds: Normal heart sounds  Pulmonary:      Effort: Pulmonary effort is normal  No respiratory distress  Breath sounds: Wheezing present  Musculoskeletal:         General: Normal range of motion  Cervical back: Normal range of motion and neck supple  Skin:     General: Skin is warm  Capillary Refill: Capillary refill takes less than 2 seconds  Neurological:      Mental Status: She is alert and oriented to person, place, and time                      Gigi Rudolph MD,   [facility]  7/1/2021

## 2021-08-02 ENCOUNTER — OFFICE VISIT (OUTPATIENT)
Dept: FAMILY MEDICINE CLINIC | Facility: CLINIC | Age: 66
End: 2021-08-02
Payer: COMMERCIAL

## 2021-08-02 VITALS
HEIGHT: 58 IN | RESPIRATION RATE: 16 BRPM | WEIGHT: 127 LBS | HEART RATE: 64 BPM | BODY MASS INDEX: 26.66 KG/M2 | SYSTOLIC BLOOD PRESSURE: 120 MMHG | TEMPERATURE: 98.2 F | DIASTOLIC BLOOD PRESSURE: 70 MMHG

## 2021-08-02 DIAGNOSIS — R06.02 SOB (SHORTNESS OF BREATH): ICD-10-CM

## 2021-08-02 DIAGNOSIS — R73.01 IMPAIRED FASTING GLUCOSE: ICD-10-CM

## 2021-08-02 DIAGNOSIS — G47.30 SLEEP APNEA, UNSPECIFIED TYPE: ICD-10-CM

## 2021-08-02 DIAGNOSIS — E78.5 HYPERLIPIDEMIA, UNSPECIFIED HYPERLIPIDEMIA TYPE: Primary | ICD-10-CM

## 2021-08-02 DIAGNOSIS — Z78.0 POST-MENOPAUSAL: ICD-10-CM

## 2021-08-02 DIAGNOSIS — F33.9 DEPRESSION, RECURRENT (HCC): ICD-10-CM

## 2021-08-02 LAB — SL AMB POCT HEMOGLOBIN AIC: 5.6 (ref ?–6.5)

## 2021-08-02 PROCEDURE — 3008F BODY MASS INDEX DOCD: CPT | Performed by: FAMILY MEDICINE

## 2021-08-02 PROCEDURE — 1036F TOBACCO NON-USER: CPT | Performed by: FAMILY MEDICINE

## 2021-08-02 PROCEDURE — 99214 OFFICE O/P EST MOD 30 MIN: CPT | Performed by: FAMILY MEDICINE

## 2021-08-02 PROCEDURE — 83036 HEMOGLOBIN GLYCOSYLATED A1C: CPT | Performed by: FAMILY MEDICINE

## 2021-08-02 NOTE — PROGRESS NOTES
Tarsha Nelson 1955 female MRN: [id number]    FAMILY PRACTICE OFFICE VISIT  [facility] - [address]      ASSESSMENT/PLAN  Tarsha Nelson is a 61 y o  female presents to the office for    Diagnoses and all orders for this visit:    Hyperlipidemia, unspecified hyperlipidemia type    Impaired fasting glucose  -     POCT hemoglobin A1c    Post-menopausal  -     Cancel: DXA bone density spine hip and pelvis; Future    Sleep apnea, unspecified type  -     Ambulatory referral to Sleep Medicine; Future    Depression, recurrent (HCC)    SOB (shortness of breath)  -     Complete PFT with post bronchodilator; Future      Hyperlipidemia likely improve given patient's  Weight loss  Patient's A1c has significantly improved as well  I encouraged that the patient continue eating healthy and dieting  Canceled DEXA scan given the patient's up-to-date  Depression screening patient is currently showing no signs of depression  Will place this has resolved concern  Does have a history of sleep apnea  Currently uncontrolled with fatigue during the day  History of shortness of breath recommend a full workup to exclude COPD  Recently recovered fully from bronchitis    BMI Counseling: Body mass index is 26 54 kg/m²  The BMI is above normal  Nutrition recommendations include consuming healthier snacks  Exercise recommendations include exercising 3-5 times per week  Pharmacotherapy was ordered to help aid in weight loss  No future appointments  SUBJECTIVE  CC: Follow-up and Fatigue      HPI:  Tarsha Nelson is a 61 y o  female who presents for  A follow-up appointment  Patient states that her bronchitis symptoms have improved  Still has shortness of breath at times  Denies any persistent cough  Patient states that she has been actively trying to lose weight  States that she has been doing very well with this method  Patient has never been tested for COPD or asthma  She states that she has not had her sleep study machine  given noncompliance in the past   However her chronic fatigue is only worsened  She would like to reestablish to get her machine back if possible  Mother n law -> second hand smoke  History-> 36 years ago stop smoking    Review of Systems   Constitutional: Positive for fatigue  Negative for activity change, appetite change, chills and fever  HENT: Negative for congestion  Respiratory: Positive for shortness of breath  Negative for cough and chest tightness  Cardiovascular: Negative for chest pain and leg swelling  Gastrointestinal: Negative for abdominal distention, abdominal pain, constipation, diarrhea, nausea and vomiting  All other systems reviewed and are negative        Historical Information   The patient history was reviewed as follows:  Past Medical History:   Diagnosis Date    Anxiety disorder 01/27/2009    last assessed 1/27/09    Chronic constipation 04/22/2004    last assessed 4/22/04    Gait disturbance 07/12/2012    last assessed 7/12/12    GERD (gastroesophageal reflux disease) 03/23/2007    last assessed 3/23/07    Primary localized osteoarthritis of hip 10/27/2011    last assessed 10/27/11    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    last assessed 11/14/07         Medications:     Current Outpatient Medications:     albuterol (Proventil HFA) 90 mcg/act inhaler, Inhale 2 puffs every 6 (six) hours as needed for wheezing, Disp: 6 7 g, Rfl: 5    b complex-vitamin C-folic acid (NEPHROCAPS) 1 mg capsule, Take 1 capsule by mouth daily, Disp: , Rfl:     cholecalciferol (VITAMIN D3) 1,000 units tablet, Take 1,000 Units by mouth daily, Disp: , Rfl:     LYSINE PO, Take by mouth, Disp: , Rfl:     pantoprazole (PROTONIX) 20 mg tablet, , Disp: , Rfl: 0    Allergies   Allergen Reactions    Ciprofloxacin Vomiting     Reaction Date: 25Mar2010;        OBJECTIVE  Vitals:   Vitals:    08/02/21 1523   BP: 120/70   BP Location: Left arm   Patient Position: Sitting   Cuff Size: Standard   Pulse: 64   Resp: 16   Temp: 98 2 °F (36 8 °C)   TempSrc: Tympanic   Weight: 57 6 kg (127 lb)   Height: 4' 10" (1 473 m)         Physical Exam  Vitals reviewed  Constitutional:       Appearance: She is well-developed  HENT:      Head: Normocephalic and atraumatic  Eyes:      Conjunctiva/sclera: Conjunctivae normal       Pupils: Pupils are equal, round, and reactive to light  Cardiovascular:      Rate and Rhythm: Normal rate and regular rhythm  Heart sounds: Normal heart sounds  Pulmonary:      Effort: Pulmonary effort is normal  No respiratory distress  Breath sounds: Normal breath sounds  Musculoskeletal:         General: Normal range of motion  Cervical back: Normal range of motion and neck supple  Skin:     General: Skin is warm  Capillary Refill: Capillary refill takes less than 2 seconds  Neurological:      Mental Status: She is alert and oriented to person, place, and time  Psychiatric:         Mood and Affect: Mood normal          Thought Content:  Thought content normal                     Aspen Em MD,   [facility]  8/2/2021

## 2021-08-16 ENCOUNTER — HOSPITAL ENCOUNTER (OUTPATIENT)
Dept: PULMONOLOGY | Facility: HOSPITAL | Age: 66
Discharge: HOME | End: 2021-08-16
Attending: FAMILY MEDICINE
Payer: COMMERCIAL

## 2021-08-16 DIAGNOSIS — R06.02 SOB (SHORTNESS OF BREATH): ICD-10-CM

## 2021-08-16 PROCEDURE — 94726 PLETHYSMOGRAPHY LUNG VOLUMES: CPT

## 2021-08-16 PROCEDURE — 94060 EVALUATION OF WHEEZING: CPT

## 2021-08-16 PROCEDURE — 94729 DIFFUSING CAPACITY: CPT

## 2021-08-16 PROCEDURE — 94729 DIFFUSING CAPACITY: CPT | Performed by: INTERNAL MEDICINE

## 2021-08-16 PROCEDURE — 94726 PLETHYSMOGRAPHY LUNG VOLUMES: CPT | Performed by: INTERNAL MEDICINE

## 2021-08-16 PROCEDURE — 94760 N-INVAS EAR/PLS OXIMETRY 1: CPT

## 2021-08-16 PROCEDURE — 94060 EVALUATION OF WHEEZING: CPT | Performed by: INTERNAL MEDICINE

## 2021-08-16 RX ORDER — ALBUTEROL SULFATE 2.5 MG/3ML
2.5 SOLUTION RESPIRATORY (INHALATION) ONCE
Status: COMPLETED | OUTPATIENT
Start: 2021-08-16 | End: 2021-08-16

## 2021-08-16 RX ADMIN — ALBUTEROL SULFATE 2.5 MG: 2.5 SOLUTION RESPIRATORY (INHALATION) at 15:11

## 2021-08-23 ENCOUNTER — TELEPHONE (OUTPATIENT)
Dept: FAMILY MEDICINE CLINIC | Facility: CLINIC | Age: 66
End: 2021-08-23

## 2021-08-23 NOTE — TELEPHONE ENCOUNTER
----- Message from Mikayla Barlow MD sent at 8/23/2021  7:45 AM EDT -----  Please advise Leslie Villanueva that her lung test came back normal  Great sign

## 2021-09-17 ENCOUNTER — VBI (OUTPATIENT)
Dept: ADMINISTRATIVE | Facility: OTHER | Age: 66
End: 2021-09-17

## 2021-09-21 ENCOUNTER — TELEPHONE (OUTPATIENT)
Dept: GASTROENTEROLOGY | Facility: CLINIC | Age: 66
End: 2021-09-21

## 2021-09-21 ENCOUNTER — TELEPHONE (OUTPATIENT)
Dept: FAMILY MEDICINE CLINIC | Facility: CLINIC | Age: 66
End: 2021-09-21

## 2021-09-21 ENCOUNTER — OFFICE VISIT (OUTPATIENT)
Dept: FAMILY MEDICINE CLINIC | Facility: CLINIC | Age: 66
End: 2021-09-21
Payer: COMMERCIAL

## 2021-09-21 VITALS
HEIGHT: 59 IN | BODY MASS INDEX: 25.8 KG/M2 | WEIGHT: 128 LBS | RESPIRATION RATE: 14 BRPM | TEMPERATURE: 98.5 F | DIASTOLIC BLOOD PRESSURE: 60 MMHG | SYSTOLIC BLOOD PRESSURE: 92 MMHG | HEART RATE: 66 BPM

## 2021-09-21 DIAGNOSIS — F33.9 DEPRESSION, RECURRENT (HCC): ICD-10-CM

## 2021-09-21 DIAGNOSIS — R10.13 EPIGASTRIC PAIN: Primary | ICD-10-CM

## 2021-09-21 DIAGNOSIS — K22.719 BARRETT'S ESOPHAGUS WITH DYSPLASIA: ICD-10-CM

## 2021-09-21 DIAGNOSIS — R19.5 DARK STOOLS: ICD-10-CM

## 2021-09-21 PROCEDURE — 99214 OFFICE O/P EST MOD 30 MIN: CPT | Performed by: FAMILY MEDICINE

## 2021-09-21 PROCEDURE — 1036F TOBACCO NON-USER: CPT | Performed by: FAMILY MEDICINE

## 2021-09-21 PROCEDURE — 3008F BODY MASS INDEX DOCD: CPT | Performed by: FAMILY MEDICINE

## 2021-09-21 RX ORDER — PANTOPRAZOLE SODIUM 40 MG/1
40 TABLET, DELAYED RELEASE ORAL
Qty: 30 TABLET | Refills: 0 | Status: SHIPPED | OUTPATIENT
Start: 2021-09-21 | End: 2021-10-15 | Stop reason: SDUPTHER

## 2021-09-21 NOTE — TELEPHONE ENCOUNTER
Patient left message that she would like to schedule an appt with Dr Ronda Bill for gastritis  Please return her call   Thank you

## 2021-09-21 NOTE — PATIENT INSTRUCTIONS
1  Contact GI and advise them you are being treated for Gastritis; and started on protoinx  Your doctor wants you to have and endoscopy, and evalution  2  Tell them your had Black stool but she feels that it was secondary to [facility] given that it has resolved

## 2021-09-21 NOTE — TELEPHONE ENCOUNTER
Dr Bolivar Rollins    Patient says she had dark stools earlier and her stomach was making gurgling noises, feels bloated  Requesting appointment with you after 3:30 today

## 2021-09-21 NOTE — PROGRESS NOTES
Cindy Farah 1955 female MRN: [id number]    [address] [facility]      ASSESSMENT/PLAN  Cindy Farah is a 61 y o  female presents to the office for    Diagnoses and all orders for this visit:    Epigastric pain  -     pantoprazole (PROTONIX) 40 mg tablet; Take 1 tablet (40 mg total) by mouth daily before breakfast    Dark stools  -     pantoprazole (PROTONIX) 40 mg tablet; Take 1 tablet (40 mg total) by mouth daily before breakfast    Depression, recurrent (HCC)    Xiong's esophagus with dysplasia      1  Epigastric pain, start Protonix 40mgs daily  Black stook likely secondary to [employer]  Would like her consult her GI doctor for a sooner EGD  Diet for gastritis/PUD discussed  2  Depression RESOLVED removed off of list  3  Hx of Jareth due for a EGD later this year  Future Appointments   Date Time Provider Cindy Nelson   12/13/2021  1:40 PM Joaquina Brasher MD [facility]   2/2/2022  4:00 PM Balaji Tovar MD [facility]          SUBJECTIVE  CC: Abdominal Pain (black stool Sunday and Monday feels bloated)      HPI:  Cindy Farah is a 61 y o  female who presents for an acute appointment   Bloated feeling and worsening symptoms  Black stool after pepto use; Had normal BMS  Today normal BM     Patient states that she has not had depression for quite some time  That has completely resolved  Does have a her history of Xiong's esophagus that was due for repeat endoscopy and colonoscopy later this year  Patient states that foods to irritate her stomach  Review of Systems   Constitutional: Positive for appetite change  Negative for activity change, chills, fatigue and fever  HENT: Negative for congestion  Respiratory: Negative for cough, chest tightness and shortness of breath  Cardiovascular: Negative for chest pain and leg swelling     Gastrointestinal: Positive for abdominal distention and abdominal pain  Negative for constipation, diarrhea, nausea and vomiting  All other systems reviewed and are negative  Historical Information   The patient history was reviewed as follows:  Past Medical History:   Diagnosis Date    Anxiety disorder 01/27/2009    last assessed 1/27/09    Chronic constipation 04/22/2004    last assessed 4/22/04    Depression, recurrent ([facility] [address] ) 10/15/2015    Gait disturbance 07/12/2012    last assessed 7/12/12    GERD (gastroesophageal reflux disease) 03/23/2007    last assessed 3/23/07    Primary localized osteoarthritis of hip 10/27/2011    last assessed 10/27/11    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    last assessed 11/14/07         Medications:     Current Outpatient Medications:     b complex-vitamin C-folic acid (NEPHROCAPS) 1 mg capsule, Take 1 capsule by mouth daily, Disp: , Rfl:     cholecalciferol (VITAMIN D3) 1,000 units tablet, Take 1,000 Units by mouth daily, Disp: , Rfl:     LYSINE PO, Take by mouth, Disp: , Rfl:     pantoprazole (PROTONIX) 20 mg tablet, , Disp: , Rfl: 0    albuterol (Proventil HFA) 90 mcg/act inhaler, Inhale 2 puffs every 6 (six) hours as needed for wheezing (Patient not taking: Reported on 9/21/2021), Disp: 6 7 g, Rfl: 5    pantoprazole (PROTONIX) 40 mg tablet, Take 1 tablet (40 mg total) by mouth daily before breakfast, Disp: 30 tablet, Rfl: 0    Allergies   Allergen Reactions    Ciprofloxacin Vomiting     Reaction Date: 25Mar2010;        OBJECTIVE  Vitals:   Vitals:    09/21/21 1545   BP: 92/60   BP Location: Left arm   Patient Position: Sitting   Cuff Size: Standard   Pulse: 66   Resp: 14   Temp: 98 5 °F (36 9 °C)   TempSrc: Temporal   Weight: 58 1 kg (128 lb)   Height: 4' 11" (1 499 m)         Physical Exam  Vitals reviewed  Constitutional:       Appearance: She is well-developed  HENT:      Head: Normocephalic and atraumatic     Eyes:      Conjunctiva/sclera: Conjunctivae normal       Pupils: Pupils are equal, round, and reactive to light  Cardiovascular:      Rate and Rhythm: Normal rate and regular rhythm  Heart sounds: Normal heart sounds  Pulmonary:      Effort: Pulmonary effort is normal  No respiratory distress  Breath sounds: Normal breath sounds  Abdominal:      General: Bowel sounds are normal       Tenderness: There is abdominal tenderness in the epigastric area  There is no right CVA tenderness or guarding  Musculoskeletal:         General: Normal range of motion  Cervical back: Normal range of motion and neck supple  Skin:     General: Skin is warm  Capillary Refill: Capillary refill takes less than 2 seconds  Neurological:      Mental Status: She is alert and oriented to person, place, and time                      Aspen Em MD,   [facility]  9/22/2021

## 2021-09-21 NOTE — TELEPHONE ENCOUNTER
Dr Lazaro Cunningham office just called regarding mutual pt   You referred requesting colonoscopy and endoscopy pt not due for these until April 2022 and dec 2022 so they will need an order for them faxed to [phone]

## 2021-09-22 NOTE — TELEPHONE ENCOUNTER
Pt advised pantoprazole increased to 40 mg she knows, asked how pt made out with Dr Candace Britt she said she had hard time scheduling prodedure without an order, I explained she needs to see the doctor first and review her issues before procedure is scheduled  She will call and make appt to see Dr Candace Britt    S/w Kacie in [facility] we are all sqaured away on what happened here, pt didn't understand what to do

## 2021-09-22 NOTE — TELEPHONE ENCOUNTER
Pt called and wanted to see if we recd ref from PCP to schedule FLIP  I advised her we had not rcd anything yet  She stated she was going to call them

## 2021-09-22 NOTE — TELEPHONE ENCOUNTER
Increase it to 40mg daily  please advise patient      Also please call Dr Vero Reed office and advise them only requesting Endoscopy given persistent epigastric pain  Unsure why an order is needed for an established patient and a consult to see what other recommendations he has for her epigastric pain

## 2021-10-13 ENCOUNTER — HOSPITAL ENCOUNTER (OUTPATIENT)
Dept: RADIOLOGY | Facility: HOSPITAL | Age: 66
Discharge: HOME | End: 2021-10-13
Attending: FAMILY MEDICINE
Payer: COMMERCIAL

## 2021-10-13 ENCOUNTER — OFFICE VISIT (OUTPATIENT)
Dept: FAMILY MEDICINE CLINIC | Facility: CLINIC | Age: 66
End: 2021-10-13
Payer: COMMERCIAL

## 2021-10-13 VITALS
WEIGHT: 131.4 LBS | DIASTOLIC BLOOD PRESSURE: 70 MMHG | RESPIRATION RATE: 16 BRPM | OXYGEN SATURATION: 97 % | HEART RATE: 67 BPM | HEIGHT: 59 IN | SYSTOLIC BLOOD PRESSURE: 116 MMHG | BODY MASS INDEX: 26.49 KG/M2 | TEMPERATURE: 98.4 F

## 2021-10-13 DIAGNOSIS — M54.10 RADICULOPATHY OF LEG: Primary | ICD-10-CM

## 2021-10-13 DIAGNOSIS — M54.10 RADICULOPATHY OF LEG: ICD-10-CM

## 2021-10-13 PROCEDURE — 73523 X-RAY EXAM HIPS BI 5/> VIEWS: CPT

## 2021-10-13 PROCEDURE — 3725F SCREEN DEPRESSION PERFORMED: CPT | Performed by: FAMILY MEDICINE

## 2021-10-13 PROCEDURE — 99214 OFFICE O/P EST MOD 30 MIN: CPT | Performed by: FAMILY MEDICINE

## 2021-10-13 PROCEDURE — 72110 X-RAY EXAM L-2 SPINE 4/>VWS: CPT

## 2021-10-13 RX ORDER — GABAPENTIN 100 MG/1
100 CAPSULE ORAL
Qty: 30 CAPSULE | Refills: 0 | Status: SHIPPED | OUTPATIENT
Start: 2021-10-13 | End: 2022-02-09

## 2021-10-15 ENCOUNTER — OFFICE VISIT (OUTPATIENT)
Dept: GASTROENTEROLOGY | Facility: CLINIC | Age: 66
End: 2021-10-15
Payer: COMMERCIAL

## 2021-10-15 VITALS
SYSTOLIC BLOOD PRESSURE: 122 MMHG | DIASTOLIC BLOOD PRESSURE: 70 MMHG | HEART RATE: 64 BPM | BODY MASS INDEX: 40.34 KG/M2 | HEIGHT: 66 IN | WEIGHT: 251 LBS

## 2021-10-15 DIAGNOSIS — Z86.010 HX OF COLONIC POLYPS: ICD-10-CM

## 2021-10-15 DIAGNOSIS — K22.70 BARRETT'S ESOPHAGUS WITHOUT DYSPLASIA: Primary | ICD-10-CM

## 2021-10-15 DIAGNOSIS — K21.9 GASTROESOPHAGEAL REFLUX DISEASE WITHOUT ESOPHAGITIS: ICD-10-CM

## 2021-10-15 DIAGNOSIS — R10.13 EPIGASTRIC PAIN: ICD-10-CM

## 2021-10-15 PROBLEM — Z86.0100 HX OF COLONIC POLYPS: Status: ACTIVE | Noted: 2021-10-15

## 2021-10-15 PROCEDURE — 99214 OFFICE O/P EST MOD 30 MIN: CPT | Performed by: NURSE PRACTITIONER

## 2021-10-15 RX ORDER — PANTOPRAZOLE SODIUM 40 MG/1
40 TABLET, DELAYED RELEASE ORAL
Qty: 30 TABLET | Refills: 4 | Status: SHIPPED | OUTPATIENT
Start: 2021-10-15 | End: 2022-02-22 | Stop reason: ALTCHOICE

## 2021-10-18 ENCOUNTER — TELEPHONE (OUTPATIENT)
Dept: FAMILY MEDICINE CLINIC | Facility: CLINIC | Age: 66
End: 2021-10-18

## 2021-10-20 ENCOUNTER — APPOINTMENT (OUTPATIENT)
Dept: RADIOLOGY | Facility: CLINIC | Age: 66
End: 2021-10-20
Payer: COMMERCIAL

## 2021-10-20 ENCOUNTER — OFFICE VISIT (OUTPATIENT)
Dept: OBGYN CLINIC | Facility: CLINIC | Age: 66
End: 2021-10-20
Payer: COMMERCIAL

## 2021-10-20 VITALS
BODY MASS INDEX: 25.72 KG/M2 | HEART RATE: 66 BPM | WEIGHT: 131 LBS | SYSTOLIC BLOOD PRESSURE: 111 MMHG | HEIGHT: 60 IN | DIASTOLIC BLOOD PRESSURE: 66 MMHG

## 2021-10-20 DIAGNOSIS — M54.10 RADICULOPATHY OF LEG: ICD-10-CM

## 2021-10-20 DIAGNOSIS — M17.11 PRIMARY OSTEOARTHRITIS OF RIGHT KNEE: ICD-10-CM

## 2021-10-20 DIAGNOSIS — M16.11 PRIMARY LOCALIZED OSTEOARTHRITIS OF RIGHT HIP: Primary | ICD-10-CM

## 2021-10-20 DIAGNOSIS — Z01.89 ENCOUNTER FOR LOWER EXTREMITY COMPARISON IMAGING STUDY: ICD-10-CM

## 2021-10-20 PROCEDURE — 73562 X-RAY EXAM OF KNEE 3: CPT

## 2021-10-20 PROCEDURE — 3008F BODY MASS INDEX DOCD: CPT | Performed by: ORTHOPAEDIC SURGERY

## 2021-10-20 PROCEDURE — 20610 DRAIN/INJ JOINT/BURSA W/O US: CPT | Performed by: ORTHOPAEDIC SURGERY

## 2021-10-20 PROCEDURE — 1036F TOBACCO NON-USER: CPT | Performed by: ORTHOPAEDIC SURGERY

## 2021-10-20 PROCEDURE — 99213 OFFICE O/P EST LOW 20 MIN: CPT | Performed by: ORTHOPAEDIC SURGERY

## 2021-10-20 PROCEDURE — 73560 X-RAY EXAM OF KNEE 1 OR 2: CPT

## 2021-10-20 RX ORDER — LIDOCAINE HYDROCHLORIDE 10 MG/ML
4 INJECTION, SOLUTION INFILTRATION; PERINEURAL
Status: COMPLETED | OUTPATIENT
Start: 2021-10-20 | End: 2021-10-20

## 2021-10-20 RX ORDER — DICLOFENAC SODIUM 75 MG/1
75 TABLET, DELAYED RELEASE ORAL 2 TIMES DAILY
Qty: 60 TABLET | Refills: 0 | Status: SHIPPED | OUTPATIENT
Start: 2021-10-20 | End: 2021-10-20 | Stop reason: CLARIF

## 2021-10-20 RX ORDER — DEXAMETHASONE SODIUM PHOSPHATE 100 MG/10ML
40 INJECTION INTRAMUSCULAR; INTRAVENOUS
Status: COMPLETED | OUTPATIENT
Start: 2021-10-20 | End: 2021-10-20

## 2021-10-20 RX ORDER — CELECOXIB 100 MG/1
100 CAPSULE ORAL 2 TIMES DAILY
Qty: 60 CAPSULE | Refills: 0 | Status: SHIPPED | OUTPATIENT
Start: 2021-10-20 | End: 2022-02-09

## 2021-10-20 RX ADMIN — LIDOCAINE HYDROCHLORIDE 4 ML: 10 INJECTION, SOLUTION INFILTRATION; PERINEURAL at 15:51

## 2021-10-20 RX ADMIN — DEXAMETHASONE SODIUM PHOSPHATE 40 MG: 100 INJECTION INTRAMUSCULAR; INTRAVENOUS at 15:51

## 2021-10-25 ENCOUNTER — EVALUATION (OUTPATIENT)
Dept: PHYSICAL THERAPY | Facility: CLINIC | Age: 66
End: 2021-10-25
Payer: COMMERCIAL

## 2021-10-25 DIAGNOSIS — M54.10 RADICULOPATHY OF LEG: ICD-10-CM

## 2021-10-25 DIAGNOSIS — M17.11 PRIMARY OSTEOARTHRITIS OF RIGHT KNEE: ICD-10-CM

## 2021-10-25 DIAGNOSIS — M16.11 PRIMARY LOCALIZED OSTEOARTHRITIS OF RIGHT HIP: ICD-10-CM

## 2021-10-25 PROCEDURE — 97161 PT EVAL LOW COMPLEX 20 MIN: CPT | Performed by: PHYSICAL THERAPIST

## 2021-10-28 ENCOUNTER — OFFICE VISIT (OUTPATIENT)
Dept: PHYSICAL THERAPY | Facility: CLINIC | Age: 66
End: 2021-10-28
Payer: COMMERCIAL

## 2021-10-28 DIAGNOSIS — M16.11 PRIMARY LOCALIZED OSTEOARTHRITIS OF RIGHT HIP: ICD-10-CM

## 2021-10-28 DIAGNOSIS — M54.10 RADICULOPATHY OF LEG: Primary | ICD-10-CM

## 2021-10-28 PROCEDURE — 97110 THERAPEUTIC EXERCISES: CPT | Performed by: PHYSICAL THERAPIST

## 2021-10-28 PROCEDURE — 97112 NEUROMUSCULAR REEDUCATION: CPT | Performed by: PHYSICAL THERAPIST

## 2021-11-01 ENCOUNTER — APPOINTMENT (OUTPATIENT)
Dept: PHYSICAL THERAPY | Facility: CLINIC | Age: 66
End: 2021-11-01
Payer: COMMERCIAL

## 2021-11-04 ENCOUNTER — OFFICE VISIT (OUTPATIENT)
Dept: PHYSICAL THERAPY | Facility: CLINIC | Age: 66
End: 2021-11-04
Payer: COMMERCIAL

## 2021-11-04 DIAGNOSIS — M54.10 RADICULOPATHY OF LEG: Primary | ICD-10-CM

## 2021-11-04 DIAGNOSIS — M17.11 PRIMARY OSTEOARTHRITIS OF RIGHT KNEE: ICD-10-CM

## 2021-11-04 DIAGNOSIS — M16.11 PRIMARY LOCALIZED OSTEOARTHRITIS OF RIGHT HIP: ICD-10-CM

## 2021-11-04 PROCEDURE — 97110 THERAPEUTIC EXERCISES: CPT | Performed by: PHYSICAL THERAPIST

## 2021-11-04 PROCEDURE — 97112 NEUROMUSCULAR REEDUCATION: CPT | Performed by: PHYSICAL THERAPIST

## 2021-11-06 NOTE — PATIENT INSTRUCTIONS
It itchiness returns please use hydrocortisone 2% over-the-counter twice a day
PAST MEDICAL HISTORY:  HTN (Hypertension)     Osteoporosis

## 2021-11-08 ENCOUNTER — OFFICE VISIT (OUTPATIENT)
Dept: PHYSICAL THERAPY | Facility: CLINIC | Age: 66
End: 2021-11-08
Payer: COMMERCIAL

## 2021-11-08 DIAGNOSIS — M17.11 PRIMARY OSTEOARTHRITIS OF RIGHT KNEE: Primary | ICD-10-CM

## 2021-11-08 PROCEDURE — 97112 NEUROMUSCULAR REEDUCATION: CPT

## 2021-11-11 ENCOUNTER — TELEMEDICINE (OUTPATIENT)
Dept: FAMILY MEDICINE CLINIC | Facility: CLINIC | Age: 66
End: 2021-11-11

## 2021-11-11 ENCOUNTER — APPOINTMENT (OUTPATIENT)
Dept: PHYSICAL THERAPY | Facility: CLINIC | Age: 66
End: 2021-11-11
Payer: COMMERCIAL

## 2021-11-11 DIAGNOSIS — Z20.828 CONTACT WITH AND (SUSPECTED) EXPOSURE TO OTHER VIRAL COMMUNICABLE DISEASES: ICD-10-CM

## 2021-11-11 DIAGNOSIS — R05.9 COUGH: Primary | ICD-10-CM

## 2021-11-11 PROCEDURE — 99213 OFFICE O/P EST LOW 20 MIN: CPT | Performed by: FAMILY MEDICINE

## 2021-11-11 PROCEDURE — 1036F TOBACCO NON-USER: CPT | Performed by: FAMILY MEDICINE

## 2021-11-13 PROCEDURE — U0003 INFECTIOUS AGENT DETECTION BY NUCLEIC ACID (DNA OR RNA); SEVERE ACUTE RESPIRATORY SYNDROME CORONAVIRUS 2 (SARS-COV-2) (CORONAVIRUS DISEASE [COVID-19]), AMPLIFIED PROBE TECHNIQUE, MAKING USE OF HIGH THROUGHPUT TECHNOLOGIES AS DESCRIBED BY CMS-2020-01-R: HCPCS | Performed by: FAMILY MEDICINE

## 2021-11-13 PROCEDURE — U0005 INFEC AGEN DETEC AMPLI PROBE: HCPCS | Performed by: FAMILY MEDICINE

## 2021-11-15 ENCOUNTER — APPOINTMENT (OUTPATIENT)
Dept: PHYSICAL THERAPY | Facility: CLINIC | Age: 66
End: 2021-11-15
Payer: COMMERCIAL

## 2021-11-18 ENCOUNTER — APPOINTMENT (OUTPATIENT)
Dept: PHYSICAL THERAPY | Facility: CLINIC | Age: 66
End: 2021-11-18
Payer: COMMERCIAL

## 2021-11-22 ENCOUNTER — APPOINTMENT (OUTPATIENT)
Dept: PHYSICAL THERAPY | Facility: CLINIC | Age: 66
End: 2021-11-22
Payer: COMMERCIAL

## 2021-12-01 ENCOUNTER — OFFICE VISIT (OUTPATIENT)
Dept: URGENT CARE | Facility: CLINIC | Age: 66
End: 2021-12-01
Payer: COMMERCIAL

## 2021-12-01 ENCOUNTER — OFFICE VISIT (OUTPATIENT)
Dept: OBGYN CLINIC | Facility: CLINIC | Age: 66
End: 2021-12-01
Payer: COMMERCIAL

## 2021-12-01 VITALS — BODY MASS INDEX: 27.09 KG/M2 | HEIGHT: 60 IN | TEMPERATURE: 97.5 F | WEIGHT: 138 LBS

## 2021-12-01 VITALS
OXYGEN SATURATION: 97 % | HEIGHT: 60 IN | DIASTOLIC BLOOD PRESSURE: 70 MMHG | TEMPERATURE: 99.5 F | WEIGHT: 138.6 LBS | RESPIRATION RATE: 18 BRPM | SYSTOLIC BLOOD PRESSURE: 120 MMHG | BODY MASS INDEX: 27.21 KG/M2 | HEART RATE: 64 BPM

## 2021-12-01 DIAGNOSIS — M16.11 PRIMARY LOCALIZED OSTEOARTHRITIS OF RIGHT HIP: ICD-10-CM

## 2021-12-01 DIAGNOSIS — M17.11 PRIMARY OSTEOARTHRITIS OF RIGHT KNEE: ICD-10-CM

## 2021-12-01 DIAGNOSIS — M54.50 ACUTE LEFT-SIDED LOW BACK PAIN WITHOUT SCIATICA: Primary | ICD-10-CM

## 2021-12-01 DIAGNOSIS — J06.9 ACUTE URI: Primary | ICD-10-CM

## 2021-12-01 PROCEDURE — 3008F BODY MASS INDEX DOCD: CPT | Performed by: INTERNAL MEDICINE

## 2021-12-01 PROCEDURE — 99213 OFFICE O/P EST LOW 20 MIN: CPT | Performed by: FAMILY MEDICINE

## 2021-12-01 PROCEDURE — 99213 OFFICE O/P EST LOW 20 MIN: CPT | Performed by: ORTHOPAEDIC SURGERY

## 2021-12-01 RX ORDER — BENZONATATE 100 MG/1
100 CAPSULE ORAL 3 TIMES DAILY PRN
Qty: 20 CAPSULE | Refills: 0 | Status: SHIPPED | OUTPATIENT
Start: 2021-12-01 | End: 2022-02-09

## 2021-12-07 ENCOUNTER — EVALUATION (OUTPATIENT)
Dept: PHYSICAL THERAPY | Facility: CLINIC | Age: 66
End: 2021-12-07
Payer: COMMERCIAL

## 2021-12-07 DIAGNOSIS — M54.16 LUMBAR RADICULOPATHY: ICD-10-CM

## 2021-12-07 DIAGNOSIS — M17.11 PRIMARY OSTEOARTHRITIS OF RIGHT KNEE: Primary | ICD-10-CM

## 2021-12-07 PROCEDURE — 97112 NEUROMUSCULAR REEDUCATION: CPT | Performed by: PHYSICAL THERAPIST

## 2021-12-07 PROCEDURE — 97110 THERAPEUTIC EXERCISES: CPT | Performed by: PHYSICAL THERAPIST

## 2021-12-13 ENCOUNTER — TELEPHONE (OUTPATIENT)
Dept: PULMONOLOGY | Facility: MEDICAL CENTER | Age: 66
End: 2021-12-13

## 2021-12-13 ENCOUNTER — TELEMEDICINE (OUTPATIENT)
Dept: PULMONOLOGY | Facility: MEDICAL CENTER | Age: 66
End: 2021-12-13

## 2021-12-13 DIAGNOSIS — G47.19 DAYTIME HYPERSOMNOLENCE: ICD-10-CM

## 2021-12-13 DIAGNOSIS — R40.0 DAYTIME SLEEPINESS: ICD-10-CM

## 2021-12-13 DIAGNOSIS — R06.83 SNORING: ICD-10-CM

## 2021-12-13 DIAGNOSIS — G47.33 OBSTRUCTIVE SLEEP APNEA: Primary | ICD-10-CM

## 2021-12-13 PROCEDURE — 1036F TOBACCO NON-USER: CPT | Performed by: INTERNAL MEDICINE

## 2021-12-13 PROCEDURE — 99214 OFFICE O/P EST MOD 30 MIN: CPT | Performed by: INTERNAL MEDICINE

## 2021-12-28 ENCOUNTER — TELEPHONE (OUTPATIENT)
Dept: OBGYN CLINIC | Facility: HOSPITAL | Age: 66
End: 2021-12-28

## 2022-01-05 ENCOUNTER — TELEMEDICINE (OUTPATIENT)
Dept: FAMILY MEDICINE CLINIC | Facility: CLINIC | Age: 67
End: 2022-01-05
Payer: COMMERCIAL

## 2022-01-05 DIAGNOSIS — R52 BODY ACHES: Primary | ICD-10-CM

## 2022-01-05 PROCEDURE — U0003 INFECTIOUS AGENT DETECTION BY NUCLEIC ACID (DNA OR RNA); SEVERE ACUTE RESPIRATORY SYNDROME CORONAVIRUS 2 (SARS-COV-2) (CORONAVIRUS DISEASE [COVID-19]), AMPLIFIED PROBE TECHNIQUE, MAKING USE OF HIGH THROUGHPUT TECHNOLOGIES AS DESCRIBED BY CMS-2020-01-R: HCPCS | Performed by: FAMILY MEDICINE

## 2022-01-05 PROCEDURE — 99213 OFFICE O/P EST LOW 20 MIN: CPT | Performed by: FAMILY MEDICINE

## 2022-01-05 PROCEDURE — U0005 INFEC AGEN DETEC AMPLI PROBE: HCPCS | Performed by: FAMILY MEDICINE

## 2022-01-05 NOTE — PROGRESS NOTES
COVID-19 Outpatient Progress Note    Assessment/Plan:    Problem List Items Addressed This Visit     None      Visit Diagnoses     Body aches    -  Primary    Relevant Orders    COVID Only - Office Collect         Disposition:     PCR testing will be performed to test for COVID-19  Discussed COVID-19 antibody testing with the patient  If you choose to have a COVID-19 antibody test, you should understand some important limitations of this test   Antibody tests detect the body's immune response to infection rather than detecting the virus itself  It can take weeks for antibodies to show up in the blood  For this reason, antibody tests are not used to detect or manage infection  They may be better for tracking infections in the community  Current antibody tests have not undergone the usual strict FDA approval process  The FDA decided to make it easier to have more tests available  The result is that these new tests may not be reliable  Since COVID-19 antibody testing is so new, we do not know how accurate it is  You can have a positive test and have not actually been infected  You also can have a negative test even though you might have been infected  There are other coronaviruses that are known to cause the common cold  Many people may have antibodies to these other viruses that could make the COVID-19 antibody test positive  More importantly, even if you test positive, this does not necessarily mean you are immune to the virus  Even so, your [facility]'s provider understands that you may still want a COVID-19 antibody test and can order this test for you  It is important that you review the results with your provider and decide together how to use them  COVID instructions provided to patient and patient instructed to self isolate at home until swab returns  Will follow up when COVID swab is available, Recommend to check [address] [place] [address] for quickest access to results     If result is positive individual must quarantine for 5 days from symptom onset and then wear a mask around family and work for a total of 5 days more  If symptoms persist then a total of 10 days of quarantine needed  If a significant exposure occurred ( within 6 feet of a COVID positive pt for 15 minutes or more without a mask on) the exposed individual must self isolate for 14 days from that date and monitor for symptoms  If symptoms present after your test results please call for retesting        While in self isolation you should be using a private bathroom, sleeping area, mask in the home if any other people are present, no shared eating utensils, etc    Deep breathing exercises to expand lung fields  Monitor temperature daily or if you feel you have a fever  Tylenol or Advil/Ibuprofen is appropriate for fever control  Check pulse oximetry,  if able,  daily and if you are having any increased shortness of breath or chest tightness  Call the office if you are having any increased respiratory symptoms or if pulse oximetry is persistently less than 92%  Current recommendations for treatment include daily multivitamin, vitamin C, zinc, and vitamin D supplementation  f you develop any chest pain, chest pain with deep breathing, shortness of breath, or trouble breathing go to the ER/911  If you should need to seek medical care, you should notify the ED or EMS that you are under investigation and/or positive for COVID-19 prior and wear a facemask if possible, scarf or bandana           I have spent 15 minutes directly with the patient  Encounter provider Bryon Florez MD    Provider located at [address] [place] [phone]    Recent Visits  No visits were found meeting these conditions    Showing recent visits within past 7 days and meeting all other requirements  Today's Visits  Date Type Provider Dept   01/05/22 Savage Nelson MD [facility]   Showing today's visits and meeting all other requirements  Future Appointments  No visits were found meeting these conditions  Showing future appointments within next 150 days and meeting all other requirements     Subjective:   Yany Francis is a 61 y o  female who is concerned about COVID-19  Patient's symptoms include fever (102-103 resolved with Tylenol), chills, fatigue (at baseline), nasal congestion, cough (Dry), myalgias and headache  Patient denies rhinorrhea, sore throat (nasal drip and a tickle), anosmia, loss of taste, shortness of breath, chest tightness, abdominal pain, nausea, vomiting and diarrhea       Date of symptom onset: 1/3/2022  Date of exposure: 12/31/2021  COVID-19 vaccination status: Not vaccinated    Exposure:   Contact with a person who is under investigation (PUI) for or who is positive for COVID-19 within the last 14 days?: Yes    Hospitalized recently for fever and/or lower respiratory symptoms?: No      Currently a healthcare worker that is involved in direct patient care?: No      Works in a special setting where the risk of COVID-19 transmission may be high? (this may include long-term care, correctional and jail facilities; homeless shelters; assisted-living facilities and group homes ): No      Resident in a special setting where the risk of COVID-19 transmission may be high? (this may include long-term care, correctional and jail facilities; homeless shelters; assisted-living facilities and group homes ): No      Lab Results   Component Value Date    SARSCOV2 Negative 11/13/2021    Delmer Grate Negative 06/26/2021     Past Medical History:   Diagnosis Date    Anxiety disorder 01/27/2009    last assessed 1/27/09    Chronic constipation 04/22/2004    last assessed 4/22/04    Depression, recurrent ([facility] [address] ) 10/15/2015    Gait disturbance 07/12/2012    last assessed 7/12/12    GERD (gastroesophageal reflux disease) 03/23/2007    last assessed 3/23/07    Primary localized osteoarthritis of hip 10/27/2011    last assessed 10/27/11    Thoracic neuritis 2007    last assessed 07     Past Surgical History:   Procedure Laterality Date     SECTION      CHOLECYSTECTOMY      COLONOSCOPY      DENTAL SURGERY      UPPER GASTROINTESTINAL ENDOSCOPY       Current Outpatient Medications   Medication Sig Dispense Refill    albuterol (Proventil HFA) 90 mcg/act inhaler Inhale 2 puffs every 6 (six) hours as needed for wheezing (Patient not taking: Reported on 2021) 6 7 g 5    b complex-vitamin C-folic acid (NEPHROCAPS) 1 mg capsule Take 1 capsule by mouth daily      benzonatate (TESSALON PERLES) 100 mg capsule Take 1 capsule (100 mg total) by mouth 3 (three) times a day as needed for cough 20 capsule 0    celecoxib (CeleBREX) 100 mg capsule Take 1 capsule (100 mg total) by mouth 2 (two) times a day (Patient not taking: Reported on 2021 ) 60 capsule 0    cholecalciferol (VITAMIN D3) 1,000 units tablet Take 1,000 Units by mouth daily      Diclofenac Sodium (VOLTAREN) 1 % Apply 2 g topically 4 (four) times a day (Patient not taking: Reported on 2021 ) 100 g 1    gabapentin (Neurontin) 100 mg capsule Take 1 capsule (100 mg total) by mouth daily at bedtime (Patient not taking: Reported on 2021 ) 30 capsule 0    LYSINE PO Take by mouth      pantoprazole (PROTONIX) 40 mg tablet Take 1 tablet (40 mg total) by mouth daily before breakfast 30 tablet 4    phenol (CHLORASEPTIC) 1 4 % mucosal liquid Apply 1 spray to the mouth or throat every 2 (two) hours as needed (Sore throat) 20 mL 0     No current facility-administered medications for this visit  Allergies   Allergen Reactions    Ciprofloxacin Vomiting     Reaction Date: ;        Review of Systems   Constitutional: Positive for chills, fatigue (at baseline) and fever (102-103 resolved with Tylenol)  HENT: Positive for congestion   Negative for rhinorrhea and sore throat (nasal drip and a tickle)  Respiratory: Positive for cough (Dry)  Negative for chest tightness and shortness of breath  Gastrointestinal: Negative for abdominal pain, diarrhea, nausea and vomiting  Musculoskeletal: Positive for myalgias  Neurological: Positive for headaches  Objective: There were no vitals filed for this visit  Exam in Car  Physical Exam  Constitutional:       Appearance: She is well-developed  HENT:      Head: Normocephalic and atraumatic  Pulmonary:      Effort: Pulmonary effort is normal    Musculoskeletal:         General: Normal range of motion  Neurological:      Mental Status: She is alert and oriented to person, place, and time  Psychiatric:         Behavior: Behavior normal          Thought Content: Thought content normal          Judgment: Judgment normal          VIRTUAL VISIT DISCLAIMER    Michael Patton verbally agrees to participate in [employer]  Pt is aware that [employer] could be limited without vital signs or the ability to perform a full hands-on physical Katy Fall understands she or the provider may request at any time to terminate the video visit and request the patient to seek care or treatment in person

## 2022-01-07 LAB — SARS-COV-2 RNA RESP QL NAA+PROBE: POSITIVE

## 2022-01-21 ENCOUNTER — HOSPITAL ENCOUNTER (OUTPATIENT)
Dept: SLEEP CENTER | Facility: CLINIC | Age: 67
Discharge: HOME | End: 2022-01-21
Payer: COMMERCIAL

## 2022-01-21 DIAGNOSIS — R06.83 SNORING: ICD-10-CM

## 2022-01-21 DIAGNOSIS — R40.0 DAYTIME SLEEPINESS: ICD-10-CM

## 2022-01-21 DIAGNOSIS — G47.33 OBSTRUCTIVE SLEEP APNEA: ICD-10-CM

## 2022-01-21 DIAGNOSIS — G47.19 DAYTIME HYPERSOMNOLENCE: ICD-10-CM

## 2022-01-21 PROCEDURE — 95811 POLYSOM 6/>YRS CPAP 4/> PARM: CPT

## 2022-01-21 PROCEDURE — 95811 POLYSOM 6/>YRS CPAP 4/> PARM: CPT | Performed by: INTERNAL MEDICINE

## 2022-01-22 NOTE — PROGRESS NOTES
Sleep Study Documentation    Pre-Sleep Study       Sleep testing procedure explained to patient:YES    Patient napped prior to study:NO    Caffeine:Dayshift worker after 12PM   Caffeine use:YES- tea  6 ounces and soda  12 ounces    Alcohol:Dayshift workers after 5PM: Alcohol use:NO    Typical day for patient:YES       Study Documentation    Sleep Study Indications: TERRELL, snoring, daytime sleepiness, fatigue, neck pain, vertigo, SOB, GERD,     Sleep Study: Split Optimal PAP pressure: 7cm  Leak:Small  Snore:Eliminated  REM Obtained:yes  Supplemental O2: no    Minimum SaO2 87%  Baseline SaO2 93%  PAP mask tried (list all) small resmed airfit N30  PAP mask choice (final) small resmed airfit N30  PAP mask type:nasal  PAP pressure at which snoring was eliminated 6cm  Minimum SaO2 at final PAP pressure 91%  Mode of Therapy:CPAP  ETCO2:No  CPAP changed to BiPAP:No    Mode of Therapy:CPAP    EKG abnormalities: no     EEG abnormalities: no    Sleep Study Recorded < 2 hours: N/A    Sleep Study Recorded > 2 hours but incomplete study: N/A    Sleep Study Recorded 6 hours but no sleep obtained: NO    Patient classification: retired       Post-Sleep Study    Medication used at bedtime or during sleep study:NO    Patient reports time it took to fall asleep:20 to 30 minutes    Patient reports waking up during study:1 to 2 times  Patient reports returning to sleep in 10 to 30 minutes  Patient reports sleeping 6 to 8 hours without dreaming  Patient reports sleep during study:better than usual    Patient rated sleepiness: Somewhat sleepy or tired    PAP treatment:yes: Post PAP treatment patient reports feeling better and  would wear PAP mask at home

## 2022-01-24 DIAGNOSIS — G47.33 OBSTRUCTIVE SLEEP APNEA: Primary | ICD-10-CM

## 2022-01-27 ENCOUNTER — TELEPHONE (OUTPATIENT)
Dept: SLEEP CENTER | Facility: CLINIC | Age: 67
End: 2022-01-27

## 2022-01-27 ENCOUNTER — DOCUMENTATION (OUTPATIENT)
Dept: PULMONOLOGY | Facility: MEDICAL CENTER | Age: 67
End: 2022-01-27

## 2022-01-27 NOTE — TELEPHONE ENCOUNTER
Patient of Dr Jenny Bagley in the [facility] pulmonary office  Called patient and advised sleep study resulted and shows mild TERRELL  APAP ordered  Patient agreeable to use [employer] for her DME provider  Advised I will send message to pulmonary office to process order  Someone from [facility] should then reach out to arrange set up of machine  Has follow up with Dr Jenny Bagley in the [facility] pulmonary office on 3/15/22

## 2022-02-09 ENCOUNTER — OFFICE VISIT (OUTPATIENT)
Dept: FAMILY MEDICINE CLINIC | Facility: CLINIC | Age: 67
End: 2022-02-09
Payer: COMMERCIAL

## 2022-02-09 VITALS
RESPIRATION RATE: 14 BRPM | HEIGHT: 60 IN | SYSTOLIC BLOOD PRESSURE: 102 MMHG | TEMPERATURE: 98.3 F | HEART RATE: 72 BPM | DIASTOLIC BLOOD PRESSURE: 70 MMHG | BODY MASS INDEX: 29.06 KG/M2 | WEIGHT: 148 LBS

## 2022-02-09 DIAGNOSIS — E78.5 HYPERLIPIDEMIA, UNSPECIFIED HYPERLIPIDEMIA TYPE: ICD-10-CM

## 2022-02-09 DIAGNOSIS — G47.30 SLEEP APNEA, UNSPECIFIED TYPE: ICD-10-CM

## 2022-02-09 DIAGNOSIS — Z12.31 ENCOUNTER FOR SCREENING MAMMOGRAM FOR MALIGNANT NEOPLASM OF BREAST: ICD-10-CM

## 2022-02-09 DIAGNOSIS — R73.09 ABNORMAL GLUCOSE: Primary | ICD-10-CM

## 2022-02-09 LAB — SL AMB POCT HEMOGLOBIN AIC: 5.8 (ref ?–6.5)

## 2022-02-09 PROCEDURE — 99214 OFFICE O/P EST MOD 30 MIN: CPT | Performed by: FAMILY MEDICINE

## 2022-02-09 PROCEDURE — 83036 HEMOGLOBIN GLYCOSYLATED A1C: CPT | Performed by: FAMILY MEDICINE

## 2022-02-09 NOTE — PROGRESS NOTES
Jarrell Chong 1955 female MRN: [id number]    [address]      ASSESSMENT/PLAN  Jarrell Chong is a 61 y o  female presents to the office for     Diagnoses and all orders for this visit:    Encounter for screening mammogram for malignant neoplasm of breast  -     Mammo screening bilateral w 3d & cad; Future    Abnormal glucose  -     POCT hemoglobin A1c  -     CBC and differential; Future  -     Comprehensive metabolic panel; Future    Sleep apnea, unspecified type    Hyperlipidemia, unspecified hyperlipidemia type  -     Lipid panel; Future    Reviewed A1c with the patient  Education given on life style modifications  Patient just recently diagnosed with sleep apnea getting a new machine  Hyperlipidemia discussed results with patient repeat levels  Due for mammogram in [facility] Maintence:  BMI Counseling: Body mass index is 29 39 kg/m²  The BMI is above normal  Nutrition recommendations include decreasing portion sizes, decreasing fast food intake and consuming healthier snacks  Exercise recommendations include exercising 3-5 times per week  Rationale for BMI follow-up plan is due to patient being overweight or obese  Disposition: Return to the office in 6 months  Future Appointments   Date Time Provider Cindy Nelson   2/22/2022 11:20 AM Gualberto Good MD [facility]   3/15/2022  1:40 PM Vidhi Dueñas MD [facility]          SUBJECTIVE  CC: Blood Pressure Check (A1C 5 8)      HPI:  Jarrell Chong is a 61 y o  femalepresenting to the office for a follow up on chronic condition  Patient states that she is going to  her new sleep apnea machine  Currently has time for herself given that her grandkids are being taken care by her mother-in-law  Patient has not been eating healthy and focusing herself  Dr Sophia Rivera Egd-> upcoming this year; feels controlled      Review of Systems Constitutional: Negative for activity change, appetite change, chills, fatigue and fever  HENT: Negative for congestion  Respiratory: Negative for cough, chest tightness and shortness of breath  Cardiovascular: Negative for chest pain and leg swelling  Gastrointestinal: Negative for abdominal distention, abdominal pain, constipation, diarrhea, nausea and vomiting  All other systems reviewed and are negative        Historical Information   The patient history was reviewed as follows:    Past Medical History:   Diagnosis Date    Anxiety disorder 2009    last assessed 09    Chronic constipation 2004    last assessed 04    Depression, recurrent ([facility] [address] ) 10/15/2015    Gait disturbance 2012    last assessed 12    GERD (gastroesophageal reflux disease) 2007    last assessed 3/23/07    Primary localized osteoarthritis of hip 10/27/2011    last assessed 10/27/11    Thoracic neuritis 2007    last assessed 07     Past Surgical History:   Procedure Laterality Date     SECTION      CHOLECYSTECTOMY      COLONOSCOPY      DENTAL SURGERY      UPPER GASTROINTESTINAL ENDOSCOPY       Family History   Problem Relation Age of Onset    Coronary artery disease Father     No Known Problems Sister     No Known Problems Mother     No Known Problems Maternal Grandmother     No Known Problems Paternal Grandmother     No Known Problems Sister     No Known Problems Sister     No Known Problems Sister     Esophageal cancer Paternal Aunt       Social History   Social History     Substance and Sexual Activity   Alcohol Use Yes    Comment: occ wine / occasional      Social History     Substance and Sexual Activity   Drug Use No     Social History     Tobacco Use   Smoking Status Former Smoker    Packs/day: 1 00    Years: 15 00    Pack years: 15 00    Quit date: 1986    Years since quittin 9   Smokeless Tobacco Never Used       Medications: Current Outpatient Medications:     b complex-vitamin C-folic acid (NEPHROCAPS) 1 mg capsule, Take 1 capsule by mouth daily, Disp: , Rfl:     cholecalciferol (VITAMIN D3) 1,000 units tablet, Take 1,000 Units by mouth daily, Disp: , Rfl:     LYSINE PO, Take by mouth, Disp: , Rfl:     pantoprazole (PROTONIX) 40 mg tablet, Take 1 tablet (40 mg total) by mouth daily before breakfast, Disp: 30 tablet, Rfl: 4  Allergies   Allergen Reactions    Ciprofloxacin Vomiting     Reaction Date: [id number];        OBJECTIVE    Vitals:   Vitals:    02/09/22 0848   BP: 102/70   BP Location: Left arm   Patient Position: Sitting   Cuff Size: Adult   Pulse: 72   Resp: 14   Temp: 98 3 °F (36 8 °C)   TempSrc: Temporal   Weight: 67 1 kg (148 lb)   Height: 4' 11 5" (1 511 m)           Physical Exam  Vitals reviewed  Constitutional:       Appearance: She is well-developed  HENT:      Head: Normocephalic and atraumatic  Eyes:      Conjunctiva/sclera: Conjunctivae normal       Pupils: Pupils are equal, round, and reactive to light  Cardiovascular:      Rate and Rhythm: Normal rate and regular rhythm  Heart sounds: Normal heart sounds  Pulmonary:      Effort: Pulmonary effort is normal  No respiratory distress  Breath sounds: Normal breath sounds  Chest:   Breasts:      Right: No inverted nipple, mass, nipple discharge, skin change or tenderness  Left: No inverted nipple, mass, nipple discharge, skin change or tenderness  Abdominal:      Hernia: There is no hernia in the left inguinal area  Genitourinary:     Labia:         Right: No rash, tenderness, lesion or injury  Left: No rash, tenderness, lesion or injury  Vagina: Normal       Cervix: No cervical motion tenderness, discharge or friability  Adnexa:         Right: No mass or tenderness  Left: No mass or tenderness  Rectum: No external hemorrhoid  Musculoskeletal:         General: Normal range of motion  Cervical back: Normal range of motion and neck supple  Skin:     General: Skin is warm and dry  Capillary Refill: Capillary refill takes less than 2 seconds  Neurological:      Mental Status: She is alert and oriented to person, place, and time  Psychiatric:         Behavior: Behavior normal          Thought Content:  Thought content normal          Judgment: Judgment normal             Darcy Stanley MD  [address]

## 2022-02-22 ENCOUNTER — OFFICE VISIT (OUTPATIENT)
Dept: GASTROENTEROLOGY | Facility: CLINIC | Age: 67
End: 2022-02-22
Payer: COMMERCIAL

## 2022-02-22 VITALS
DIASTOLIC BLOOD PRESSURE: 75 MMHG | HEART RATE: 74 BPM | SYSTOLIC BLOOD PRESSURE: 117 MMHG | HEIGHT: 58 IN | WEIGHT: 148 LBS | BODY MASS INDEX: 31.07 KG/M2

## 2022-02-22 DIAGNOSIS — K21.9 GASTROESOPHAGEAL REFLUX DISEASE WITHOUT ESOPHAGITIS: Primary | ICD-10-CM

## 2022-02-22 DIAGNOSIS — Z86.010 HX OF COLONIC POLYPS: ICD-10-CM

## 2022-02-22 DIAGNOSIS — R10.13 EPIGASTRIC PAIN: ICD-10-CM

## 2022-02-22 DIAGNOSIS — K22.70 BARRETT'S ESOPHAGUS WITHOUT DYSPLASIA: ICD-10-CM

## 2022-02-22 PROCEDURE — 1036F TOBACCO NON-USER: CPT | Performed by: INTERNAL MEDICINE

## 2022-02-22 PROCEDURE — 99214 OFFICE O/P EST MOD 30 MIN: CPT | Performed by: INTERNAL MEDICINE

## 2022-02-22 PROCEDURE — 3008F BODY MASS INDEX DOCD: CPT | Performed by: INTERNAL MEDICINE

## 2022-02-22 RX ORDER — ESOMEPRAZOLE MAGNESIUM 40 MG/1
40 CAPSULE, DELAYED RELEASE ORAL
Qty: 90 CAPSULE | Refills: 2 | Status: SHIPPED | OUTPATIENT
Start: 2022-02-22

## 2022-02-22 NOTE — PROGRESS NOTES
[facility] - Outpatient Follow-up Note  Herminio Sawant 61 y o  female MRN: [id number]  Encounter: [id number]          ASSESSMENT AND PLAN:      1  Gastroesophageal reflux disease without esophagitis  -     esomeprazole (NexIUM) 40 MG capsule; Take 1 capsule (40 mg total) by mouth daily before breakfast    2  Xiong's esophagus without dysplasia  -     esomeprazole (NexIUM) 40 MG capsule; Take 1 capsule (40 mg total) by mouth daily before breakfast    3  Epigastric pain  -     esomeprazole (NexIUM) 40 MG capsule; Take 1 capsule (40 mg total) by mouth daily before breakfast    4  Hx of colonic polyps      History of GERD, epigastric discomfort fullness bloating, most of the symptoms from dyspepsia have improved  Will switch to esomeprazole at patient's request   No apparent chest pains  Dyspepsia IBS management reviewed, anti-reflux measures emphasized  Side effects reviewed, follow-up with me as needed  Recent EGD colonoscopy biopsy results noted  ______________________________________________________________________    SUBJECTIVE:      Patient came in for evaluation of her history of heartburn acid reflux indigestion occasional discomfort upper abdomen, feeling much better, the upper abdominal pain is much improved since she is watching her diet and taking medication  She had a fullness bloating which seems to have improved as well, taking PPI on a daily basis, she is concerned if she is feeling more tired fatigued on pantoprazole wants to try a different PPI  Bowels are irregular  Denies any chest pain shortness of breath fever chills rash  Diet medications more than 10 systems reviewed  REVIEW OF SYSTEMS IS OTHERWISE NEGATIVE        Historical Information   Past Medical History:   Diagnosis Date    Anxiety disorder 01/27/2009    last assessed 1/27/09    Chronic constipation 04/22/2004    last assessed 4/22/04    [facility]    Gait disturbance 2012    last assessed 12    GERD (gastroesophageal reflux disease) 2007    last assessed 3/23/07    Primary localized osteoarthritis of hip 10/27/2011    last assessed 10/27/11    Thoracic neuritis 2007    last assessed 07     Past Surgical History:   Procedure Laterality Date     SECTION      CHOLECYSTECTOMY      COLONOSCOPY      DENTAL SURGERY      UPPER GASTROINTESTINAL ENDOSCOPY       Social History   Social History     Substance and Sexual Activity   Alcohol Use Yes    Comment: occ wine / occasional      Social History     Substance and Sexual Activity   Drug Use No     Social History     Tobacco Use   Smoking Status Former Smoker    Packs/day: 1     Years: 15     Pack years: 15     Quit date: 1986    Years since quittin 0   Smokeless Tobacco Never Used     Family History   Problem Relation Age of Onset    Coronary artery disease Father     No Known Problems Sister     No Known Problems Mother     No Known Problems Maternal Grandmother     No Known Problems Paternal Grandmother     No Known Problems Sister     No Known Problems Sister     No Known Problems Sister     Esophageal cancer Paternal Aunt        Meds/Allergies       Current Outpatient Medications:     b complex-vitamin C-folic acid (NEPHROCAPS) 1 mg capsule    cholecalciferol (VITAMIN D3) 1,000 units tablet    LYSINE PO    esomeprazole (NexIUM) 40 MG capsule    Allergies   Allergen Reactions    Ciprofloxacin Vomiting     Reaction Date: 2010; Objective     Blood pressure 117/75, pulse 74, height 4' 10" (1 473 m), weight 67 1 kg (148 lb)  Body mass index is 30 93 kg/m²  PHYSICAL EXAM:      General Appearance:   Alert, cooperative, no distress   HEENT:   Normocephalic, atraumatic, anicteric       Neck:  Supple, symmetrical, trachea midline   Lungs:   Clear to auscultation bilaterally; no rales, rhonchi or wheezing; respirations unlabored Heart[de-identified]   Regular rate and rhythm; no murmur  Abdomen:   Soft, non-tender, non-distended; normal bowel sounds; no masses, no organomegaly    Genitalia:   Deferred    Rectal:   Deferred    Extremities:  No cyanosis, clubbing or edema    Skin:  No jaundice, rashes, or lesions    Lymph nodes:  No palpable cervical lymphadenopathy        Lab Results:   No visits with results within 1 Day(s) from this visit  Latest known visit with results is:   Office Visit on 02/09/2022   Component Date Value    Hemoglobin A1C 02/09/2022 5 8          Radiology Results:   No results found

## 2022-03-31 ENCOUNTER — OFFICE VISIT (OUTPATIENT)
Dept: FAMILY MEDICINE CLINIC | Facility: CLINIC | Age: 67
End: 2022-03-31
Payer: COMMERCIAL

## 2022-03-31 VITALS
SYSTOLIC BLOOD PRESSURE: 110 MMHG | HEART RATE: 72 BPM | RESPIRATION RATE: 16 BRPM | HEIGHT: 58 IN | BODY MASS INDEX: 30.44 KG/M2 | DIASTOLIC BLOOD PRESSURE: 70 MMHG | TEMPERATURE: 98.2 F | WEIGHT: 145 LBS

## 2022-03-31 DIAGNOSIS — M79.641 RIGHT HAND PAIN: Primary | ICD-10-CM

## 2022-03-31 DIAGNOSIS — M25.562 ACUTE PAIN OF LEFT KNEE: ICD-10-CM

## 2022-03-31 DIAGNOSIS — S46.212S STRAIN OF LEFT BICEPS, SEQUELA: ICD-10-CM

## 2022-03-31 PROCEDURE — 3008F BODY MASS INDEX DOCD: CPT | Performed by: INTERNAL MEDICINE

## 2022-03-31 PROCEDURE — 99214 OFFICE O/P EST MOD 30 MIN: CPT | Performed by: FAMILY MEDICINE

## 2022-03-31 RX ORDER — METHYLPREDNISOLONE 4 MG/1
TABLET ORAL
Qty: 21 EACH | Refills: 0 | Status: SHIPPED | OUTPATIENT
Start: 2022-03-31

## 2022-03-31 NOTE — PROGRESS NOTES
Gregg Cantu 1955 female MRN: [id number]    FAMILY PRACTICE OFFICE VISIT  [facility] - [address]      ASSESSMENT/PLAN  Gregg Cantu is a 74 y o  female presents to the office for    Diagnoses and all orders for this visit:    Right hand pain  -     XR hand 3+ vw right; Future  -     methylPREDNISolone 4 MG tablet therapy pack; Use as directed on package    Acute pain of left knee  -     XR knee 3 vw left non injury; Future    Strain of left biceps, sequela  -     methylPREDNISolone 4 MG tablet therapy pack; Use as directed on package     left arm likely a muscle strain  No signs of wore indications of a blood clot  If it worsens then at that time will do a Doppler  See the rest above for treatment plan           Future Appointments   Date Time Provider Cindy Nelson   6/7/2022 11:00 AM Isha Nixon MD [facility]          SUBJECTIVE  CC: Arm Pain (left upper arm pain), Hand Pain (rt hand pain), and Knee Pain (left knee)      HPI:  Gregg Cantu is a 74 y o  female who presents for acute appointment  Right hand pain-> pain with motion,this has swelling  Specifically between the 4th and the 3rd digit  Left knee only hurt when kneeling and specifically over her patella  1 week of left upper arm pain  Concerned about possible blood clot  Review of Systems   Constitutional: Negative for activity change, appetite change, chills, fatigue and fever  HENT: Negative for congestion  Respiratory: Negative for cough, chest tightness and shortness of breath  Cardiovascular: Negative for chest pain and leg swelling  Gastrointestinal: Negative for abdominal distention, abdominal pain, constipation, diarrhea, nausea and vomiting  Musculoskeletal: Positive for arthralgias and joint swelling  All other systems reviewed and are negative        Historical Information   The patient history was reviewed as follows:  Past Medical History:   Diagnosis Date  Anxiety disorder 01/27/2009    last assessed 1/27/09    Chronic constipation 04/22/2004    last assessed 4/22/04    [facility]    Gait disturbance 07/12/2012    last assessed 7/12/12    GERD (gastroesophageal reflux disease) 03/23/2007    last assessed 3/23/07    Primary localized osteoarthritis of hip 10/27/2011    last assessed 10/27/11    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    last assessed 11/14/07         Medications:     Current Outpatient Medications:     b complex-vitamin C-folic acid (NEPHROCAPS) 1 mg capsule, Take 1 capsule by mouth daily, Disp: , Rfl:     cholecalciferol (VITAMIN D3) 1,000 units tablet, Take 1,000 Units by mouth daily, Disp: , Rfl:     esomeprazole (NexIUM) 40 MG capsule, Take 1 capsule (40 mg total) by mouth daily before breakfast, Disp: 90 capsule, Rfl: 2    LYSINE PO, Take by mouth, Disp: , Rfl:     methylPREDNISolone 4 MG tablet therapy pack, Use as directed on package, Disp: 21 each, Rfl: 0    Allergies   Allergen Reactions    Ciprofloxacin Vomiting     Reaction Date: 25Mar2010;        OBJECTIVE  Vitals:   Vitals:    03/31/22 1618   BP: 110/70   BP Location: Left arm   Patient Position: Sitting   Cuff Size: Adult   Pulse: 72   Resp: 16   Temp: 98 2 °F (36 8 °C)   TempSrc: Temporal   Weight: 65 8 kg (145 lb)   Height: 4' 10" (1 473 m)         Physical Exam  Vitals reviewed  Constitutional:       Appearance: She is well-developed  HENT:      Head: Normocephalic and atraumatic  Eyes:      Conjunctiva/sclera: Conjunctivae normal       Pupils: Pupils are equal, round, and reactive to light  Cardiovascular:      Rate and Rhythm: Normal rate and regular rhythm  Heart sounds: Normal heart sounds  Pulmonary:      Effort: Pulmonary effort is normal  No respiratory distress  Breath sounds: Normal breath sounds  Musculoskeletal:         General: Normal range of motion  Cervical back: Normal range of motion and neck supple  Comments: Right hand swelling  Left knee palpable nodule over the patella  Tenderness over the left bicep but nothing appreciated with mass  Not warm or showing signs of erythema   Skin:     General: Skin is warm  Capillary Refill: Capillary refill takes less than 2 seconds  Neurological:      Mental Status: She is alert and oriented to person, place, and time                      Na Baker MD,   [facility]  3/31/2022

## 2022-04-02 ENCOUNTER — APPOINTMENT (OUTPATIENT)
Dept: RADIOLOGY | Facility: CLINIC | Age: 67
End: 2022-04-02
Payer: COMMERCIAL

## 2022-04-02 DIAGNOSIS — M79.641 RIGHT HAND PAIN: ICD-10-CM

## 2022-04-02 DIAGNOSIS — M25.562 ACUTE PAIN OF LEFT KNEE: ICD-10-CM

## 2022-04-02 PROCEDURE — 73562 X-RAY EXAM OF KNEE 3: CPT

## 2022-04-02 PROCEDURE — 73130 X-RAY EXAM OF HAND: CPT

## 2022-04-06 ENCOUNTER — TELEPHONE (OUTPATIENT)
Dept: FAMILY MEDICINE CLINIC | Facility: CLINIC | Age: 67
End: 2022-04-06

## 2022-04-06 NOTE — TELEPHONE ENCOUNTER
----- Message from Esther Abraham MD sent at 4/5/2022 10:10 PM EDT -----  I see some arthritis changes in her fingers  Exactly where her swelling is  But very minimal   If she would like we could always send her to a hand surgeon for  an evaluation  Other then that no other abnormalities  On hand xray  Left knee, does show swelling with changes of arthritis   I do recommend see ortho for her knee  Please give her information on [facility]      Let me know what she says

## 2022-04-06 NOTE — TELEPHONE ENCOUNTER
Patient advised of the same - watching grandson and cannot take down information at this time, will call back

## 2022-06-07 ENCOUNTER — TELEMEDICINE (OUTPATIENT)
Dept: PULMONOLOGY | Facility: MEDICAL CENTER | Age: 67
End: 2022-06-07
Payer: COMMERCIAL

## 2022-06-07 DIAGNOSIS — G47.19 DAYTIME HYPERSOMNOLENCE: ICD-10-CM

## 2022-06-07 DIAGNOSIS — G47.33 OBSTRUCTIVE SLEEP APNEA: Primary | ICD-10-CM

## 2022-06-07 PROCEDURE — 99214 OFFICE O/P EST MOD 30 MIN: CPT | Performed by: INTERNAL MEDICINE

## 2022-06-07 PROCEDURE — 1036F TOBACCO NON-USER: CPT | Performed by: INTERNAL MEDICINE

## 2022-06-07 NOTE — PROGRESS NOTES
Virtual Regular Visit    Verification of patient location:    Patient is located in the following state in which I hold an active license [place]      Assessment/Plan:    Problem List Items Addressed This Visit        Respiratory    Obstructive sleep apnea - Primary     Mild TERRELL with AHI 5 8 events per hour, and a trial of auto CPAP with pressure range of 7-20 cm H2O compliant using the machine with 97% compliance averaging 5 hours 16 minutes per night with residual AHI of 0 2 events per hour  Other    Daytime hypersomnolence               Reason for visit is   Chief Complaint   Patient presents with    Virtual Regular Visit        Encounter provider Luther Rodriguez MD    Provider located at [address] [place] [id number]      Recent Visits  No visits were found meeting these conditions  Showing recent visits within past 7 days and meeting all other requirements  Today's Visits  Date Type Provider Dept   06/07/22 Telemedicine Cresencio Mckenzie MD [facility]   Showing today's visits and meeting all other requirements  Future Appointments  No visits were found meeting these conditions  Showing future appointments within next 150 days and meeting all other requirements       The patient was identified by name and date of birth  Delonte Youssef was informed that this is a telemedicine visit and that the visit is being conducted through [facility] and patient was informed that this is not a secure, HIPAA-compliant platform  She agrees to proceed     My office door was closed  No one else was in the room  She acknowledged consent and understanding of privacy and security of the video platform  The patient has agreed to participate and understands they can discontinue the visit at any time  Patient is aware this is a billable service       Subjective  Delonte Youssef is a 74 y o  female having a virtual visit today for recent diagnosed mild TERRELL and symptoms of excessive daytime sleepiness and interrupted sleep pattern  The patient started using a trial of auto CPAP with pressure range of 7-20 cm H2O initially had issues with significantly dry mouth due to dry air conditioning conditions in her room and the machine was utilizing all the air that was in the humidifier very quickly every night  She recently bought a new humidifier in her room with subsequent improvement of the dry mouth  She is currently doing very well with using the machine every night her compliance reflects 97% compliance 90% of which are more than 4 hours averaging 5 hours 16 minutes per night with residual AHI of 0 2 events per hour with no significant mask leak  She also feels significant improvement of her daytime sleepiness and sleep quality at night   Julita Fuentes is a 74 y o  female having a virtual visit today for recent diagnosed mild TERRELL and symptoms of excessive daytime sleepiness and interrupted sleep pattern  The patient started using a trial of auto CPAP with pressure range of 7-20 cm H2O initially had issues with significantly dry mouth due to dry air conditioning conditions in her room and the machine was utilizing all the air that was in the humidifier very quickly every night  She recently bought a new humidifier in her room with subsequent improvement of the dry mouth  She is currently doing very well with using the machine every night her compliance reflects 97% compliance 90% of which are more than 4 hours averaging 5 hours 16 minutes per night with residual AHI of 0 2 events per hour with no significant mask leak  She also feels significant improvement of her daytime sleepiness and sleep quality at night          Past Medical History:   Diagnosis Date    Anxiety disorder 01/27/2009    last assessed 1/27/09    Chronic constipation 04/22/2004    last assessed 4/22/04    Depression, recurrent ([facility] ) 10/15/2015    Gait disturbance 2012    last assessed 12    GERD (gastroesophageal reflux disease) 2007    last assessed 3/23/07    Primary localized osteoarthritis of hip 10/27/2011    last assessed 10/27/11    Thoracic neuritis 2007    last assessed 07       Past Surgical History:   Procedure Laterality Date     SECTION      CHOLECYSTECTOMY      COLONOSCOPY      DENTAL SURGERY      UPPER GASTROINTESTINAL ENDOSCOPY         Current Outpatient Medications   Medication Sig Dispense Refill    b complex-vitamin C-folic acid (NEPHROCAPS) 1 mg capsule Take 1 capsule by mouth daily      cholecalciferol (VITAMIN D3) 1,000 units tablet Take 1,000 Units by mouth daily      esomeprazole (NexIUM) 40 MG capsule Take 1 capsule (40 mg total) by mouth daily before breakfast 90 capsule 2    LYSINE PO Take by mouth      methylPREDNISolone 4 MG tablet therapy pack Use as directed on package (Patient not taking: Reported on 2022) 21 each 0     No current facility-administered medications for this visit  Allergies   Allergen Reactions    Ciprofloxacin Vomiting     Reaction Date: ;        Review of Systems   All other systems reviewed and are negative  Video Exam    There were no vitals filed for this visit  Physical Exam  Constitutional:       General: She is not in acute distress  Appearance: Normal appearance  She is not ill-appearing, toxic-appearing or diaphoretic  HENT:      Head: Normocephalic and atraumatic  Nose: No congestion or rhinorrhea  Eyes:      General: No scleral icterus  Right eye: No discharge  Left eye: No discharge  Conjunctiva/sclera: Conjunctivae normal    Pulmonary:      Effort: Pulmonary effort is normal    Musculoskeletal:      Cervical back: Normal range of motion and neck supple  Skin:     Coloration: Skin is not jaundiced or pale  Neurological:      Mental Status: She is oriented to person, place, and time  Psychiatric:         Mood and Affect: Mood normal          Behavior: Behavior normal          Thought Content: Thought content normal          Judgment: Judgment normal           I spent 25 minutes with patient today in which greater than 50% of the time was spent in counseling/coordination of care regarding TERRELL    VIRTUAL VISIT 2025 Sim Almeida verbally agrees to participate in [employer]  Pt is aware that [employer] could be limited without vital signs or the ability to perform a full hands-on physical Inna Sly understands she or the provider may request at any time to terminate the video visit and request the patient to seek care or treatment in person

## 2022-06-07 NOTE — ASSESSMENT & PLAN NOTE
Mild TERRELL with AHI 5 8 events per hour, and a trial of auto CPAP with pressure range of 7-20 cm H2O compliant using the machine with 97% compliance averaging 5 hours 16 minutes per night with residual AHI of 0 2 events per hour

## 2022-10-18 ENCOUNTER — TELEPHONE (OUTPATIENT)
Dept: GASTROENTEROLOGY | Facility: CLINIC | Age: 67
End: 2022-10-18

## 2022-10-18 ENCOUNTER — TELEPHONE (OUTPATIENT)
Dept: FAMILY MEDICINE CLINIC | Facility: CLINIC | Age: 67
End: 2022-10-18

## 2022-10-18 ENCOUNTER — PREP FOR PROCEDURE (OUTPATIENT)
Dept: GASTROENTEROLOGY | Facility: CLINIC | Age: 67
End: 2022-10-18

## 2022-10-18 DIAGNOSIS — Z86.010 HX OF COLONIC POLYPS: Primary | ICD-10-CM

## 2022-10-18 DIAGNOSIS — R10.13 EPIGASTRIC PAIN: ICD-10-CM

## 2022-10-18 DIAGNOSIS — K21.9 GASTROESOPHAGEAL REFLUX DISEASE WITHOUT ESOPHAGITIS: Primary | ICD-10-CM

## 2022-10-18 DIAGNOSIS — Z86.010 HISTORY OF COLON POLYPS: ICD-10-CM

## 2022-10-18 DIAGNOSIS — K22.70 BARRETT'S ESOPHAGUS WITHOUT DYSPLASIA: ICD-10-CM

## 2022-10-18 NOTE — TELEPHONE ENCOUNTER
Dr Jeff Driver    Patient is requesting order for colonoscopy and EGD so she can follow up with gastro Dr Jamey Nelson  She has not yet done labs that were ordered as she is trying to lose weight

## 2022-10-18 NOTE — TELEPHONE ENCOUNTER
Patient does not need referral as Dr Nisa Burgos told her she was already due so the referral is unnecessary

## 2022-10-18 NOTE — TELEPHONE ENCOUNTER
Scheduled date of colonoscopy (as of today): 12/27/2022    Physician performing colonoscopy: Dr Pallavi Estrada    Location of colonoscopy: [facility]    Bowel prep reviewed with patient: Loy    Instructions reviewed with patient by: AS    Clearances: N/A

## 2022-10-18 NOTE — TELEPHONE ENCOUNTER
I[facility] Keenan [facility] Assessment    Name: Roseann Lopez  YOB: 1955  Last Height: 5'0"  Last weight: 150 lbs  BMI: 30 31 kg/m²  Procedure: Colon and Egd  Diagnosis: History of polyps, GERD, Barrets, epigastric pain  Date of procedure: 12/27/2022  Prep: Golytely  Responsible :  Manolo Escobar  Phone#: [phone]  Name completing form: Yaidra Carrizales  [place] form completed: 10/18/22      If the patient answers yes to any of these questions, schedule in a hospital  Are you pregnant: No  Do you rely on a wheelchair for mobility: No  Have you been diagnosed with End Stage Renal Disease (ESRD): No  Do you need oxygen during the day: No  Have you had a heart attack or stroke within the past three months: No  Have you had a seizure within the past three months: No  Have you ever been informed by anesthesia that you have a difficult airway: No  Additional Questions  Have you had any cardiac testing or are under the care of a Cardiologist (see cardiac list): No  Cardiac list:   Do you have an implanted cardiac defibrillator: No (Comment:  This patient should be scheduled in the hospital)    Have any bleeding problems, such as anemia or hemophilia (If patient has H&H result below 8, schedule in hospital   H&H must be within 30 days of procedure): No    Had an organ transplant within the past 3 months: No    Do you have any present infections: No  Do you get short of breath when walking a few blocks: No  Have you been diagnosed with diabetes: No  Comments (provide cardiac provider information if applicable):

## 2022-12-23 ENCOUNTER — TELEPHONE (OUTPATIENT)
Dept: GASTROENTEROLOGY | Facility: CLINIC | Age: 67
End: 2022-12-23

## 2022-12-23 DIAGNOSIS — Z86.010 HX OF COLONIC POLYPS: ICD-10-CM

## 2022-12-23 NOTE — TELEPHONE ENCOUNTER
Please resend Golytely preparation into pt's [employer] in [place]  Thank you so much!

## 2022-12-23 NOTE — TELEPHONE ENCOUNTER
Spoke to pt  confirming pt's colonoscopy/egd scheduled on 1/4/23 at [facility] with Dr Mitchell Morning   Informed [facility] would be calling 1-2 days prior with the arrival time  Informed of clear liquid diet day prior as well as the bowel cleansing preparation  Informed would need a  the day of the procedure due to being under sedation  Pt did not receive instructions or the bowel cleansing preparation  I emailed her the instructions and have a message to provider to please resend Golytely

## 2022-12-27 ENCOUNTER — NURSE TRIAGE (OUTPATIENT)
Dept: OTHER | Facility: OTHER | Age: 67
End: 2022-12-27

## 2022-12-27 NOTE — TELEPHONE ENCOUNTER
Patient is asking if someone can please send her the instructions for her bowel prep to her home or she will come pick it up  Attempted to help patient set up [employer] to send it that way but she states that she is not good with computers  Patient states "i'm not good with computers" sent the code for [employer] nicholas  Patient states she would  the information on paper or is asking if someone will send it to her home  Attempted to give patient the instructions for her bowel prep but she stated she wanted the information on paper

## 2022-12-27 NOTE — TELEPHONE ENCOUNTER
Regarding: colon prep questions  ----- Message from Johana Bucio sent at 12/27/2022  3:36 PM EST -----  "I am having a colonoscopy on 1/4/2023 and I have question about my prep and what not to eat and things like that"

## 2022-12-27 NOTE — TELEPHONE ENCOUNTER
Reason for Disposition  • Bowel prep for colonoscopy, questions about    Answer Assessment - Initial Assessment Questions  1  DATE/TIME: "When did you have your colonoscopy?"       Having it on 1/4  2  MAIN CONCERN: "What is your main concern right now?" "What questions do you have?"      Bowel prep  3  ABDOMEN PAIN: "Are you having any abdomen (belly or stomach) pain?" If Yes, ask: "How bad is it?" (e g , Scale 1-10; mild, moderate, severe)  - MILD (1-3): doesn't interfere with normal activities, abdomen soft and not tender to touch      - MODERATE (4-7): interferes with normal activities or awakens from sleep, tender to touch      - SEVERE (8-10): excruciating pain, doubled over, unable to do any normal activities        n/a  4  OTHER SYMPTOMS: "What other symptoms are you having?" (e g , rectal bleeding, bloating or feeling gassy, passing gas, vomiting, dizziness, fever)  n/a  5  ONSET: "When did your symptoms start?"      n/a  6   PATTERN: "Is the symptom(s) constant or does it come and go?" "Is your symptom(s) getting worse, better, or staying the same?"      n/a    Protocols used: COLONOSCOPY SYMPTOMS AND QUESTIONS-ADULT-AH

## 2022-12-29 ENCOUNTER — OFFICE VISIT (OUTPATIENT)
Dept: URGENT CARE | Facility: CLINIC | Age: 67
End: 2022-12-29

## 2022-12-29 ENCOUNTER — APPOINTMENT (OUTPATIENT)
Dept: RADIOLOGY | Facility: CLINIC | Age: 67
End: 2022-12-29

## 2022-12-29 VITALS
WEIGHT: 154 LBS | HEART RATE: 78 BPM | HEIGHT: 60 IN | BODY MASS INDEX: 30.23 KG/M2 | OXYGEN SATURATION: 98 % | TEMPERATURE: 99 F | RESPIRATION RATE: 20 BRPM

## 2022-12-29 DIAGNOSIS — R05.1 ACUTE COUGH: Primary | ICD-10-CM

## 2022-12-29 DIAGNOSIS — R05.1 ACUTE COUGH: ICD-10-CM

## 2022-12-29 RX ORDER — ALBUTEROL SULFATE 90 UG/1
2 AEROSOL, METERED RESPIRATORY (INHALATION) EVERY 6 HOURS PRN
Qty: 8 G | Refills: 0 | Status: SHIPPED | OUTPATIENT
Start: 2022-12-29

## 2022-12-29 RX ORDER — BENZONATATE 100 MG/1
100 CAPSULE ORAL 3 TIMES DAILY PRN
Qty: 30 CAPSULE | Refills: 0 | Status: SHIPPED | OUTPATIENT
Start: 2022-12-29

## 2022-12-29 NOTE — PROGRESS NOTES
[address]        NAME: Rich Knight is a 74 y o  female  : 1955    MRN: [id number]  DATE: 2022  TIME: 11:55 AM    Assessment and Plan   Acute cough [R05 1]  1  Acute cough  XR chest pa & lateral    benzonatate (TESSALON PERLES) 100 mg capsule    albuterol (PROVENTIL HFA,VENTOLIN HFA) 90 mcg/act inhaler    Covid/Flu-Office Collect            Patient Instructions   Patient Instructions   Take Tessalon as needed and use the albuterol every 6 hours for the next few days   Normal chest xray per my read  Will follow up with radiology if they see anything I missed         Follow up with PCP in 3-5 days  Proceed to  ER if symptoms worsen  Chief Complaint     Chief Complaint   Patient presents with   • Cough     Cough, (runny nose previously )         History of Present Illness       The patient is a 79-year-old female presenting today for a cough and rhinorrhea since   Her sister currently has pneumonia but she has not been around her  Denies any fevers or chills  Denies any chest pain or shortness of breath  Cough is productive  Review of Systems   Review of Systems   Constitutional: Negative for activity change, appetite change, chills, fatigue and fever  HENT: Positive for congestion  Negative for ear pain, rhinorrhea, sinus pressure, sinus pain and sore throat  Eyes: Negative for pain and visual disturbance  Respiratory: Positive for cough  Negative for chest tightness and shortness of breath  Cardiovascular: Negative for chest pain and palpitations  Gastrointestinal: Negative for abdominal pain, diarrhea, nausea and vomiting  Genitourinary: Negative for dysuria and hematuria  Musculoskeletal: Negative for arthralgias, back pain and myalgias  Skin: Negative for color change, pallor and rash  Neurological: Negative for seizures, syncope and headaches  All other systems reviewed and are negative          Current Medications       Current Outpatient Medications:   •  albuterol (PROVENTIL HFA,VENTOLIN HFA) 90 mcg/act inhaler, Inhale 2 puffs every 6 (six) hours as needed for wheezing, Disp: 8 g, Rfl: 0  •  b complex-vitamin C-folic acid (NEPHROCAPS) 1 mg capsule, Take 1 capsule by mouth daily, Disp: , Rfl:   •  benzonatate (TESSALON PERLES) 100 mg capsule, Take 1 capsule (100 mg total) by mouth 3 (three) times a day as needed for cough, Disp: 30 capsule, Rfl: 0  •  cholecalciferol (VITAMIN D3) 1,000 units tablet, Take 1,000 Units by mouth daily, Disp: , Rfl:   •  esomeprazole (NexIUM) 40 MG capsule, Take 1 capsule (40 mg total) by mouth daily before breakfast, Disp: 90 capsule, Rfl: 2  •  LYSINE PO, Take by mouth, Disp: , Rfl:   •  methylPREDNISolone 4 MG tablet therapy pack, Use as directed on package (Patient not taking: Reported on 6/7/2022), Disp: 21 each, Rfl: 0  •  polyethylene glycol (GOLYTELY) 4000 mL solution, Take by mouth as directed by the office for colonoscopy prep   (Patient not taking: Reported on 12/29/2022), Disp: 4000 mL, Rfl: 0    Current Allergies     Allergies as of 12/29/2022 - Reviewed 03/31/2022   Allergen Reaction Noted   • Ciprofloxacin Vomiting 03/25/2010            The following portions of the patient's history were reviewed and updated as appropriate: allergies, current medications, past family history, past medical history, past social history, past surgical history and problem list      Past Medical History:   Diagnosis Date   • Anxiety disorder 01/27/2009    last assessed 1/27/09   • Chronic constipation 04/22/2004    last assessed 4/22/04   • Depression, recurrent ([facility] ) 10/15/2015   • Gait disturbance 07/12/2012    last assessed 7/12/12   • GERD (gastroesophageal reflux disease) 03/23/2007    last assessed 3/23/07   • Primary localized osteoarthritis of hip 10/27/2011    last assessed 10/27/11   • 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    last assessed 11/14/07       Past Surgical History:   Procedure Laterality Date   •  SECTION     • CHOLECYSTECTOMY     • COLONOSCOPY     • DENTAL SURGERY     • UPPER GASTROINTESTINAL ENDOSCOPY         Family History   Problem Relation Age of Onset   • Coronary artery disease Father    • No Known Problems Sister    • No Known Problems Mother    • No Known Problems Maternal Grandmother    • No Known Problems Paternal Grandmother    • No Known Problems Sister    • No Known Problems Sister    • No Known Problems Sister    • Esophageal cancer Paternal Aunt          Medications have been verified  Objective   Pulse 78   Temp 99 °F (37 2 °C)   Resp 20   Ht 5' (1 524 m)   Wt 69 9 kg (154 lb)   SpO2 98%   BMI 30 08 kg/m²        Physical Exam     Physical Exam  Vitals and nursing note reviewed  Constitutional:       General: She is not in acute distress  Appearance: Normal appearance  She is well-developed and normal weight  She is not ill-appearing, toxic-appearing or diaphoretic  HENT:      Head: Normocephalic and atraumatic  Right Ear: Tympanic membrane and ear canal normal  No drainage, swelling or tenderness  No middle ear effusion  Tympanic membrane is not erythematous  Left Ear: Tympanic membrane and ear canal normal  No drainage, swelling or tenderness  No middle ear effusion  Tympanic membrane is not erythematous  Nose: No congestion or rhinorrhea  Mouth/Throat:      Mouth: Mucous membranes are moist  No oral lesions  Pharynx: Oropharynx is clear  Uvula midline  Posterior oropharyngeal erythema present  No pharyngeal swelling, oropharyngeal exudate or uvula swelling  Tonsils: No tonsillar exudate or tonsillar abscesses  0 on the right  0 on the left  Eyes:      Extraocular Movements:      Right eye: Normal extraocular motion  Left eye: Normal extraocular motion  Conjunctiva/sclera: Conjunctivae normal       Pupils: Pupils are equal, round, and reactive to light     Cardiovascular:      Rate and Rhythm: Normal rate and regular rhythm  Heart sounds: Normal heart sounds  No murmur heard  No friction rub  No gallop  Pulmonary:      Effort: Pulmonary effort is normal  No respiratory distress  Breath sounds: No stridor  Rhonchi (very slight in the lower lung fields) present  No wheezing or rales  Chest:      Chest wall: No tenderness  Skin:     General: Skin is warm and dry  Capillary Refill: Capillary refill takes less than 2 seconds  Neurological:      Mental Status: She is alert

## 2022-12-29 NOTE — PATIENT INSTRUCTIONS
Take Tessalon as needed and use the albuterol every 6 hours for the next few days   Normal chest xray per my read   Will follow up with radiology if they see anything I missed

## 2022-12-30 LAB
FLUAV RNA RESP QL NAA+PROBE: NEGATIVE
FLUBV RNA RESP QL NAA+PROBE: NEGATIVE
SARS-COV-2 RNA RESP QL NAA+PROBE: NEGATIVE

## 2023-01-03 DIAGNOSIS — K21.9 GASTROESOPHAGEAL REFLUX DISEASE WITHOUT ESOPHAGITIS: ICD-10-CM

## 2023-01-03 DIAGNOSIS — R10.13 EPIGASTRIC PAIN: ICD-10-CM

## 2023-01-03 DIAGNOSIS — K22.70 BARRETT'S ESOPHAGUS WITHOUT DYSPLASIA: ICD-10-CM

## 2023-01-03 RX ORDER — ESOMEPRAZOLE MAGNESIUM 40 MG/1
40 CAPSULE, DELAYED RELEASE ORAL
Qty: 90 CAPSULE | Refills: 2 | Status: SHIPPED | OUTPATIENT
Start: 2023-01-03

## 2023-01-11 ENCOUNTER — RA CDI HCC (OUTPATIENT)
Dept: OTHER | Facility: HOSPITAL | Age: 68
End: 2023-01-11

## 2023-01-11 NOTE — PROGRESS NOTES
[facility]  coding opportunities       Chart reviewed, no opportunity found:   [address]        Patients Insurance     Medicare Insurance: [employer]

## 2023-01-12 ENCOUNTER — TELEPHONE (OUTPATIENT)
Dept: OTHER | Facility: OTHER | Age: 68
End: 2023-01-12

## 2023-01-12 NOTE — TELEPHONE ENCOUNTER
Call from patient advising she has called 3 times regarding having her colonoscopy instructions mailed to her and she still has not received them  Please mail instructions to patients home

## 2023-01-16 ENCOUNTER — OFFICE VISIT (OUTPATIENT)
Dept: FAMILY MEDICINE CLINIC | Facility: CLINIC | Age: 68
End: 2023-01-16

## 2023-01-16 VITALS
RESPIRATION RATE: 14 BRPM | HEIGHT: 60 IN | WEIGHT: 154 LBS | HEART RATE: 74 BPM | OXYGEN SATURATION: 97 % | TEMPERATURE: 98 F | DIASTOLIC BLOOD PRESSURE: 72 MMHG | SYSTOLIC BLOOD PRESSURE: 110 MMHG | BODY MASS INDEX: 30.23 KG/M2

## 2023-01-16 DIAGNOSIS — Z11.4 SCREENING FOR HIV (HUMAN IMMUNODEFICIENCY VIRUS): ICD-10-CM

## 2023-01-16 DIAGNOSIS — Z00.00 WELCOME TO MEDICARE PREVENTIVE VISIT: Primary | ICD-10-CM

## 2023-01-16 DIAGNOSIS — M46.1 SACROILIITIS (HCC): ICD-10-CM

## 2023-01-16 DIAGNOSIS — Z11.59 NEED FOR HEPATITIS C SCREENING TEST: ICD-10-CM

## 2023-01-16 DIAGNOSIS — R73.03 PRE-DIABETES: ICD-10-CM

## 2023-01-16 DIAGNOSIS — K22.70 BARRETT'S ESOPHAGUS WITHOUT DYSPLASIA: ICD-10-CM

## 2023-01-16 DIAGNOSIS — E78.5 HYPERLIPIDEMIA, UNSPECIFIED HYPERLIPIDEMIA TYPE: ICD-10-CM

## 2023-01-16 NOTE — PROGRESS NOTES
Assessment and Plan:     Problem List Items Addressed This Visit    None  Visit Diagnoses     Welcome to Medicare preventive visit    -  Primary    Relevant Orders    POCT ECG      HPV Negative last year  (+) several years ago  Given vaginal atrophy unable to perform exam  Attempt was made  Xiong-> EGD scheduled  EKG normal  HLD lipid orderd  All screening labs sent  Sacroilitis stable    BMI Counseling: Body mass index is 30 08 kg/m²  The BMI is above normal  Nutrition recommendations include decreasing portion sizes, encouraging healthy choices of fruits and vegetables and consuming healthier snacks  Exercise recommendations include exercising 3-5 times per week  Rationale for BMI follow-up plan is due to patient being overweight or obese  Depression Screening and Follow-up Plan: Patient was screened for depression during today's encounter  They screened negative with a PHQ-2 score of 0  Preventive health issues were discussed with patient, and age appropriate screening tests were ordered as noted in patient's After Visit Summary  Personalized health advice and appropriate referrals for health education or preventive services given if needed, as noted in patient's After Visit Summary  History of Present Illness:     Patient presents for a Medicare Wellness Visit  Patient is doing well  She states that she has no acute concerns  Is scheduled for her endoscopy  Patient states that she has no lower back pain  HPI   Patient Care Team:  Merlene Chahal MD as PCP - General (Family Medicine)  XAVIER Jaimes     Review of Systems:     Review of Systems   Constitutional: Negative for activity change, appetite change, chills, fatigue and fever  HENT: Negative for congestion  Respiratory: Negative for cough, chest tightness and shortness of breath  Cardiovascular: Negative for chest pain and leg swelling     Gastrointestinal: Negative for abdominal distention, abdominal pain, constipation, diarrhea, nausea and vomiting  All other systems reviewed and are negative         Problem List:     Patient Active Problem List   Diagnosis   • Xiong esophagus   • Vitamin D deficiency   • Increased frequency of urination   • Snoring   • Menopause   • Impaired fasting glucose   • Hyperlipidemia   • Herpes simplex virus (HSV) infection   • Positive test for human papillomavirus (HPV)   • Fatigue   • Neck pain   • Benign paroxysmal positional vertigo due to bilateral vestibular disorder   • Sacroiliitis (HCC)   • Closed fracture of first lumbar vertebra (HCC)   • Daytime hypersomnolence   • Daytime sleepiness   • Obstructive sleep apnea   • SOB (shortness of breath)   • Epigastric pain   • Gastroesophageal reflux disease without esophagitis   • Hx of colonic polyps      Past Medical and Surgical History:     Past Medical History:   Diagnosis Date   • Anxiety disorder 2009    last assessed 09   • Chronic constipation 2004    last assessed 04   • [facility] ) 10/15/2015   • Gait disturbance 2012    last assessed 12   • GERD (gastroesophageal reflux disease) 2007    last assessed 3/23/07   • Primary localized osteoarthritis of hip 10/27/2011    last assessed 10/27/11   • Thoracic neuritis 2007    last assessed 07     Past Surgical History:   Procedure Laterality Date   •  SECTION     • CHOLECYSTECTOMY     • COLONOSCOPY     • DENTAL SURGERY     • UPPER GASTROINTESTINAL ENDOSCOPY        Family History:     Family History   Problem Relation Age of Onset   • Coronary artery disease Father    • No Known Problems Sister    • No Known Problems Mother    • No Known Problems Maternal Grandmother    • No Known Problems Paternal Grandmother    • No Known Problems Sister    • No Known Problems Sister    • No Known Problems Sister    • Esophageal cancer Paternal Aunt       Social History:     Social History     Socioeconomic History   • Marital status: /Civil [employer]     Spouse name: None   • Number of children: None   • Years of education: None   • Highest education level: None   Occupational History   • None   Tobacco Use   • Smoking status: Former     Packs/day: 1 00     Years: 15 00     Pack years: 15 00     Types: Cigarettes     Quit date: 1986     Years since quittin 9   • Smokeless tobacco: Never   Vaping Use   • Vaping Use: Never used   Substance and Sexual Activity   • Alcohol use: Yes     Comment: occ wine / occasional    • Drug use: No   • Sexual activity: None   Other Topics Concern   • None   Social History Narrative    Caffeine use      Social Determinants of Health     Financial Resource Strain: Not on file   Food Insecurity: Not on file   Transportation Needs: Not on file   Physical Activity: Not on file   Stress: Not on file   Social Connections: Not on file   Intimate Partner Violence: Not on file   Housing Stability: Not on file      Medications and Allergies:     Current Outpatient Medications   Medication Sig Dispense Refill   • esomeprazole (NexIUM) 40 MG capsule Take 1 capsule (40 mg total) by mouth daily before breakfast 90 capsule 2   • albuterol (PROVENTIL HFA,VENTOLIN HFA) 90 mcg/act inhaler Inhale 2 puffs every 6 (six) hours as needed for wheezing (Patient not taking: Reported on 2023) 8 g 0   • b complex-vitamin C-folic acid (NEPHROCAPS) 1 mg capsule Take 1 capsule by mouth daily (Patient not taking: Reported on 2023)     • benzonatate (TESSALON PERLES) 100 mg capsule Take 1 capsule (100 mg total) by mouth 3 (three) times a day as needed for cough (Patient not taking: Reported on 2023) 30 capsule 0   • cholecalciferol (VITAMIN D3) 1,000 units tablet Take 1,000 Units by mouth daily (Patient not taking: Reported on 2023)     • LYSINE PO Take by mouth (Patient not taking: Reported on 2023)     • methylPREDNISolone 4 MG tablet therapy pack Use as directed on package (Patient not taking: Reported on 6/7/2022) 21 each 0   • polyethylene glycol (GOLYTELY) 4000 mL solution Take by mouth as directed by the office for colonoscopy prep  (Patient not taking: Reported on 12/29/2022) 4000 mL 0     No current facility-administered medications for this visit  Allergies   Allergen Reactions   • Ciprofloxacin Vomiting     Reaction Date: [id number];       Immunizations:     Immunization History   Administered Date(s) Administered   • Hep B, adult 12/13/2012, 01/15/2013, 06/13/2013   • Influenza, seasonal, injectable 1955, 12/13/2012, 10/22/2013   • Tdap 08/28/2013, 06/15/2020      Health Maintenance:         Topic Date Due   • Hepatitis C Screening  Never done   • HIV Screening  Never done   • Breast Cancer Screening: Mammogram  03/01/2022   • Colorectal Cancer Screening  12/11/2022         Topic Date Due   • COVID-19 Vaccine (1) Never done   • Pneumococcal Vaccine: 65+ Years (1 - PCV) Never done      Medicare Screening Tests and Risk Assessments:     Shira Liao is here for her Welcome to Medicare visit  Health Risk Assessment:   Patient rates overall health as very good  Patient feels that their physical health rating is much better  Patient is very satisfied with their life  Eyesight was rated as same  Hearing was rated as same  Patient feels that their emotional and mental health rating is same  Patients states they are never, rarely angry  Patient states they are never, rarely unusually tired/fatigued  Pain experienced in the last 7 days has been none  Patient states that she has experienced weight loss or gain in last 6 months  Depression Screening:   PHQ-2 Score: 0      Fall Risk Screening: In the past year, patient has experienced: no history of falling in past year      Urinary Incontinence Screening:   Patient has not leaked urine accidently in the last six months  Home Safety:  Patient does not have trouble with stairs inside or outside of their home   Patient has working smoke alarms and has working carbon monoxide detector  Home safety hazards include: none  Nutrition:   Current diet is Regular  Medications:   Patient is not currently taking any over-the-counter supplements  Patient is able to manage medications  Activities of Daily Living (ADLs)/Instrumental Activities of Daily Living (IADLs):   Walk and transfer into and out of bed and chair?: Yes  Dress and groom yourself?: Yes    Bathe or shower yourself?: Yes    Feed yourself? Yes  Do your laundry/housekeeping?: Yes  Manage your money, pay your bills and track your expenses?: Yes  Make your own meals?: Yes    Do your own shopping?: Yes    Previous Hospitalizations:   Any hospitalizations or ED visits within the last 12 months?: No      Advance Care Planning:   Living will: No    Durable POA for healthcare: No    Advanced directive: No      Cognitive Screening:   Provider or family/friend/caregiver concerned regarding cognition?: No    PREVENTIVE SCREENINGS      Cardiovascular Screening:    General: Screening Not Indicated and History Lipid Disorder      Diabetes Screening:     General: Screening Current      Colorectal Cancer Screening:     General: Screening Current      Breast Cancer Screening:     General: Screening Current      Cervical Cancer Screening:    General: Screening Not Indicated      Osteoporosis Screening:    General: Screening Current      Abdominal Aortic Aneurysm (AAA) Screening:        General: Risks and Benefits Discussed      Lung Cancer Screening:     General: Screening Not Indicated    Screening, Brief Intervention, and Referral to Treatment (SBIRT)    Screening  Typical number of drinks in a day: 0  Typical number of drinks in a week: 1  Interpretation: Low risk drinking behavior      Vision Screening    Right eye Left eye Both eyes   Without correction      With correction 20/40 20/40 20/30        Physical Exam:     /72 (BP Location: Left arm, Patient Position: Sitting, Cuff Size: Standard)   Pulse 74 Temp 98 °F (36 7 °C) (Temporal)   Resp 14   Ht 5' (1 524 m)   Wt 69 9 kg (154 lb)   SpO2 97%   BMI 30 08 kg/m²     Physical Exam  Vitals reviewed  Constitutional:       Appearance: Normal appearance  She is well-developed  HENT:      Head: Normocephalic and atraumatic  Right Ear: Tympanic membrane, ear canal and external ear normal  There is no impacted cerumen  Left Ear: Tympanic membrane, ear canal and external ear normal  There is no impacted cerumen  Nose: Nose normal       Mouth/Throat:      Mouth: Mucous membranes are moist       Pharynx: Oropharynx is clear  Eyes:      Conjunctiva/sclera: Conjunctivae normal       Pupils: Pupils are equal, round, and reactive to light  Cardiovascular:      Rate and Rhythm: Normal rate and regular rhythm  Heart sounds: Normal heart sounds  Pulmonary:      Effort: Pulmonary effort is normal       Breath sounds: Normal breath sounds  Abdominal:      General: Abdomen is flat  Bowel sounds are normal       Palpations: Abdomen is soft  Musculoskeletal:         General: Normal range of motion  Cervical back: Normal range of motion and neck supple  Skin:     General: Skin is warm  Capillary Refill: Capillary refill takes less than 2 seconds  Neurological:      General: No focal deficit present  Mental Status: She is alert and oriented to person, place, and time  Mental status is at baseline  Psychiatric:         Mood and Affect: Mood normal          Behavior: Behavior normal          Thought Content:  Thought content normal          Judgment: Judgment normal           Rosalia Beltran MD

## 2023-01-16 NOTE — PATIENT INSTRUCTIONS
Medicare Preventive Visit Patient Instructions  Thank you for completing your Welcome to Medicare Visit or Medicare Annual Wellness Visit today  Your next wellness visit will be due in one year (1/17/2024)  The screening/preventive services that you may require over the next 5-10 years are detailed below  Some tests may not apply to you based off risk factors and/or age  Screening tests ordered at today's visit but not completed yet may show as past due  Also, please note that scanned in results may not display below  Preventive Screenings:  Service Recommendations Previous Testing/Comments   Colorectal Cancer Screening  * Colonoscopy    * Fecal Occult Blood Test (FOBT)/Fecal Immunochemical Test (FIT)  * Fecal DNA/Cologuard Test  * Flexible Sigmoidoscopy Age: 39-70 years old   Colonoscopy: every 10 years (may be performed more frequently if at higher risk)  OR  FOBT/FIT: every 1 year  OR  Cologuard: every 3 years  OR  Sigmoidoscopy: every 5 years  Screening may be recommended earlier than age 39 if at higher risk for colorectal cancer  Also, an individualized decision between you and your healthcare provider will decide whether screening between the ages of 74-80 would be appropriate  Colonoscopy: 12/11/2017  FOBT/FIT: Not on file  Cologuard: Not on file  Sigmoidoscopy: Not on file    Screening Current     Breast Cancer Screening Age: 36 years old  Frequency: every 1-2 years  Not required if history of left and right mastectomy Mammogram: 03/01/2021    Screening Current   Cervical Cancer Screening Between the ages of 21-29, pap smear recommended once every 3 years  Between the ages of 33-67, can perform pap smear with HPV co-testing every 5 years     Recommendations may differ for women with a history of total hysterectomy, cervical cancer, or abnormal pap smears in past  Pap Smear: Not on file    Screening Not Indicated   Hepatitis C Screening Once for adults born between 1945 and 1965  More frequently in patients at high risk for Hepatitis C Hep C Antibody: Not on file        Diabetes Screening 1-2 times per year if you're at risk for diabetes or have pre-diabetes Fasting glucose: 97 mg/dL (1/26/2021)  A1C: 5 8 (2/9/2022)  Screening Current   Cholesterol Screening Once every 5 years if you don't have a lipid disorder  May order more often based on risk factors  Lipid panel: 01/26/2021    Screening Not Indicated  History Lipid Disorder     Other Preventive Screenings Covered by Medicare:  1  Abdominal Aortic Aneurysm (AAA) Screening: covered once if your at risk  You're considered to be at risk if you have a family history of AAA  2  Lung Cancer Screening: covers low dose CT scan once per year if you meet all of the following conditions: (1) Age 50-69; (2) No signs or symptoms of lung cancer; (3) Current smoker or have quit smoking within the last 15 years; (4) You have a tobacco smoking history of at least 20 pack years (packs per day multiplied by number of years you smoked); (5) You get a written order from a healthcare provider  3  Glaucoma Screening: covered annually if you're considered high risk: (1) You have diabetes OR (2) Family history of glaucoma OR (3)  aged 48 and older OR (3)  American aged 72 and older  3  Osteoporosis Screening: covered every 2 years if you meet one of the following conditions: (1) You're estrogen deficient and at risk for osteoporosis based off medical history and other findings; (2) Have a vertebral abnormality; (3) On glucocorticoid therapy for more than 3 months; (4) Have primary hyperparathyroidism; (5) On osteoporosis medications and need to assess response to drug therapy  · Last bone density test (DXA Scan): 03/01/2021   5  HIV Screening: covered annually if you're between the age of 15-65  Also covered annually if you are younger than 13 and older than 72 with risk factors for HIV infection   For pregnant patients, it is covered up to 3 times per pregnancy  Immunizations:  Immunization Recommendations   Influenza Vaccine Annual influenza vaccination during flu season is recommended for all persons aged >= 6 months who do not have contraindications   Pneumococcal Vaccine   * Pneumococcal conjugate vaccine = PCV13 (Prevnar 13), PCV15 (Vaxneuvance), PCV20 (Prevnar 20)  * Pneumococcal polysaccharide vaccine = PPSV23 (Pneumovax) Adults 25-60 years old: 1-3 doses may be recommended based on certain risk factors  Adults 72 years old: 1-2 doses may be recommended based off what pneumonia vaccine you previously received   Hepatitis B Vaccine 3 dose series if at intermediate or high risk (ex: diabetes, end stage renal disease, liver disease)   Tetanus (Td) Vaccine - COST NOT COVERED BY MEDICARE PART B Following completion of primary series, a booster dose should be given every 10 years to maintain immunity against tetanus  Td may also be given as tetanus wound prophylaxis  Tdap Vaccine - COST NOT COVERED BY MEDICARE PART B Recommended at least once for all adults  For pregnant patients, recommended with each pregnancy  Shingles Vaccine (Shingrix) - COST NOT COVERED BY MEDICARE PART B  2 shot series recommended in those aged 48 and above     Health Maintenance Due:      Topic Date Due   • Hepatitis C Screening  Never done   • HIV Screening  Never done   • Breast Cancer Screening: Mammogram  03/01/2022   • Colorectal Cancer Screening  12/11/2022     Immunizations Due:      Topic Date Due   • COVID-19 Vaccine (1) Never done   • Pneumococcal Vaccine: 65+ Years (1 - PCV) Never done     Advance Directives   What are advance directives? Advance directives are legal documents that state your wishes and plans for medical care  These plans are made ahead of time in case you lose your ability to make decisions for yourself  Advance directives can apply to any medical decision, such as the treatments you want, and if you want to donate organs     What are the types of advance directives? There are many types of advance directives, and each state has rules about how to use them  You may choose a combination of any of the following:  · Living will: This is a written record of the treatment you want  You can also choose which treatments you do not want, which to limit, and which to stop at a certain time  This includes surgery, medicine, IV fluid, and tube feedings  · Durable power of  for healthcare [facility]): This is a written record that states who you want to make healthcare choices for you when you are unable to make them for yourself  This person, called a proxy, is usually a family member or a friend  You may choose more than 1 proxy  · Do not resuscitate (DNR) order:  A DNR order is used in case your heart stops beating or you stop breathing  It is a request not to have certain forms of treatment, such as CPR  A DNR order may be included in other types of advance directives  · Medical directive: This covers the care that you want if you are in a coma, near death, or unable to make decisions for yourself  You can list the treatments you want for each condition  Treatment may include pain medicine, surgery, blood transfusions, dialysis, IV or tube feedings, and a ventilator (breathing machine)  · Values history: This document has questions about your views, beliefs, and how you feel and think about life  This information can help others choose the care that you would choose  Why are advance directives important? An advance directive helps you control your care  Although spoken wishes may be used, it is better to have your wishes written down  Spoken wishes can be misunderstood, or not followed  Treatments may be given even if you do not want them  An advance directive may make it easier for your family to make difficult choices about your care     Weight Management   Why it is important to manage your weight:  Being overweight increases your risk of health conditions such as heart disease, high blood pressure, type 2 diabetes, and certain types of cancer  It can also increase your risk for osteoarthritis, sleep apnea, and other respiratory problems  Aim for a slow, steady weight loss  Even a small amount of weight loss can lower your risk of health problems  How to lose weight safely:  A safe and healthy way to lose weight is to eat fewer calories and get regular exercise  You can lose up about 1 pound a week by decreasing the number of calories you eat by 500 calories each day  Healthy meal plan for weight management:  A healthy meal plan includes a variety of foods, contains fewer calories, and helps you stay healthy  A healthy meal plan includes the following:  · Eat whole-grain foods more often  A healthy meal plan should contain fiber  Fiber is the part of grains, fruits, and vegetables that is not broken down by your body  Whole-grain foods are healthy and provide extra fiber in your diet  Some examples of whole-grain foods are whole-wheat breads and pastas, oatmeal, brown rice, and bulgur  · Eat a variety of vegetables every day  Include dark, leafy greens such as spinach, kale, uday greens, and mustard greens  Eat yellow and orange vegetables such as carrots, sweet potatoes, and winter squash  · Eat a variety of fruits every day  Choose fresh or canned fruit (canned in its own juice or light syrup) instead of juice  Fruit juice has very little or no fiber  · Eat low-fat dairy foods  Drink fat-free (skim) milk or 1% milk  Eat fat-free yogurt and low-fat cottage cheese  Try low-fat cheeses such as mozzarella and other reduced-fat cheeses  · Choose meat and other protein foods that are low in fat  Choose beans or other legumes such as split peas or lentils  Choose fish, skinless poultry (chicken or turkey), or lean cuts of red meat (beef or pork)  Before you cook meat or poultry, cut off any visible fat  · Use less fat and oil    Try baking foods instead of frying them  Add less fat, such as margarine, sour cream, regular salad dressing and mayonnaise to foods  Eat fewer high-fat foods  Some examples of high-fat foods include french fries, doughnuts, ice cream, and cakes  · Eat fewer sweets  Limit foods and drinks that are high in sugar  This includes candy, cookies, regular soda, and sweetened drinks  Exercise:  Exercise at least 30 minutes per day on most days of the week  Some examples of exercise include walking, biking, dancing, and swimming  You can also fit in more physical activity by taking the stairs instead of the elevator or parking farther away from stores  Ask your healthcare provider about the best exercise plan for you  © Copyright [employer] 2018 Information is for End User's use only and may not be sold, redistributed or otherwise used for commercial purposes   All illustrations and images included in CareNotes® are the copyrighted property of A D A M , Inc  or [address] [place] [address]

## 2023-01-27 ENCOUNTER — TELEPHONE (OUTPATIENT)
Dept: GASTROENTEROLOGY | Facility: CLINIC | Age: 68
End: 2023-01-27

## 2023-01-27 NOTE — TELEPHONE ENCOUNTER
Spoke to pt confirming pt's colonoscopy/egd scheduled on 2/6/23 at [facility] with Dr Fareed Swift   Informed [facility] would be calling 1-2 days prior with the arrival time  Pt has her instructions and did not have any questions

## 2023-02-06 ENCOUNTER — ANESTHESIA (OUTPATIENT)
Dept: GASTROENTEROLOGY | Facility: AMBULATORY SURGERY CENTER | Age: 68
End: 2023-02-06

## 2023-02-06 ENCOUNTER — HOSPITAL ENCOUNTER (OUTPATIENT)
Dept: GASTROENTEROLOGY | Facility: AMBULATORY SURGERY CENTER | Age: 68
Discharge: HOME | End: 2023-02-06

## 2023-02-06 ENCOUNTER — ANESTHESIA EVENT (OUTPATIENT)
Dept: GASTROENTEROLOGY | Facility: AMBULATORY SURGERY CENTER | Age: 68
End: 2023-02-06

## 2023-02-06 VITALS
HEART RATE: 74 BPM | OXYGEN SATURATION: 98 % | DIASTOLIC BLOOD PRESSURE: 64 MMHG | BODY MASS INDEX: 30.43 KG/M2 | TEMPERATURE: 98.8 F | RESPIRATION RATE: 18 BRPM | WEIGHT: 155 LBS | SYSTOLIC BLOOD PRESSURE: 122 MMHG | HEIGHT: 60 IN

## 2023-02-06 DIAGNOSIS — R10.13 EPIGASTRIC PAIN: ICD-10-CM

## 2023-02-06 DIAGNOSIS — K21.9 GASTROESOPHAGEAL REFLUX DISEASE WITHOUT ESOPHAGITIS: ICD-10-CM

## 2023-02-06 DIAGNOSIS — K22.70 BARRETT'S ESOPHAGUS WITHOUT DYSPLASIA: ICD-10-CM

## 2023-02-06 DIAGNOSIS — Z86.010 HISTORY OF COLON POLYPS: ICD-10-CM

## 2023-02-06 RX ORDER — SODIUM CHLORIDE 9 MG/ML
20 INJECTION, SOLUTION INTRAVENOUS CONTINUOUS
Status: DISCONTINUED | OUTPATIENT
Start: 2023-02-06 | End: 2023-02-10 | Stop reason: HOSPADM

## 2023-02-06 RX ORDER — PROPOFOL 10 MG/ML
INJECTION, EMULSION INTRAVENOUS AS NEEDED
Status: DISCONTINUED | OUTPATIENT
Start: 2023-02-06 | End: 2023-02-06

## 2023-02-06 RX ORDER — SODIUM CHLORIDE 9 MG/ML
125 INJECTION, SOLUTION INTRAVENOUS CONTINUOUS
Status: DISCONTINUED | OUTPATIENT
Start: 2023-02-06 | End: 2023-02-10 | Stop reason: HOSPADM

## 2023-02-06 RX ADMIN — PROPOFOL 50 MG: 10 INJECTION, EMULSION INTRAVENOUS at 12:45

## 2023-02-06 RX ADMIN — PROPOFOL 100 MG: 10 INJECTION, EMULSION INTRAVENOUS at 12:38

## 2023-02-06 RX ADMIN — PROPOFOL 100 MG: 10 INJECTION, EMULSION INTRAVENOUS at 12:32

## 2023-02-06 RX ADMIN — SODIUM CHLORIDE: 9 INJECTION, SOLUTION INTRAVENOUS at 12:21

## 2023-02-06 RX ADMIN — PROPOFOL 50 MG: 10 INJECTION, EMULSION INTRAVENOUS at 12:50

## 2023-02-06 RX ADMIN — SODIUM CHLORIDE: 9 INJECTION, SOLUTION INTRAVENOUS at 12:54

## 2023-02-06 RX ADMIN — PROPOFOL 50 MG: 10 INJECTION, EMULSION INTRAVENOUS at 12:39

## 2023-02-06 NOTE — ANESTHESIA PREPROCEDURE EVALUATION
Procedure:  COLONOSCOPY  EGD    Relevant Problems   CARDIO   (+) Hyperlipidemia      GI/HEPATIC   (+) Gastroesophageal reflux disease without esophagitis      MUSCULOSKELETAL   (+) Sacroiliitis (HCC)      NEURO/PSYCH   (+) Hx of colonic polyps      PULMONARY   (+) Obstructive sleep apnea (uses CPAP)   (+) SOB (shortness of breath)     Anxiety/depression     Physical Exam    Airway    Mallampati score: I  TM Distance: >3 FB  Neck ROM: full     Dental   Comment: None loose, No notable dental hx     Cardiovascular      Pulmonary      Other Findings        Anesthesia Plan  ASA Score- 2     Anesthesia Type- IV sedation with anesthesia with ASA Monitors  Additional Monitors:   Airway Plan:     Comment: Last of PO bowel prep: 06:45     Patient educated on the possibility for awareness under sedation and of the possibility of airway intervention in the event of an airway or procedural emergency    Plan Factors-Exercise tolerance (METS): >4 METS  Chart reviewed  Patient summary reviewed  Patient is not a current smoker  Induction- intravenous  Postoperative Plan-     Informed Consent- Anesthetic plan and risks discussed with patient  I personally reviewed this patient with the CRNA  Discussed and agreed on the Anesthesia Plan with the CRNA  Sammy Canseco

## 2023-02-06 NOTE — H&P
History and Physical - [facility]  Patricia Holley 74 y o  female MRN: [id number]                  HPI: Patricia Holley is a 74y o  year old female who presents for history of reflux and polyps      REVIEW OF SYSTEMS: Per the HPI, and otherwise unremarkable      Historical Information   Past Medical History:   Diagnosis Date   • Anxiety    • Anxiety disorder 2009    last assessed 09   • Chronic constipation 2004    last assessed 04; resolved 23   • Depression, recurrent ([facility] [address] ) 10/15/2015   • Gait disturbance 2012    last assessed 12   • GERD (gastroesophageal reflux disease) 2007    last assessed 3/23/07   • History of vertigo     feels well today; has had crystal therapy in past   23   • Hyperlipidemia    • Primary localized osteoarthritis of hip 10/27/2011    last assessed 10/27/11   • Sleep apnea    • Thoracic neuritis 2007    last assessed 07     Past Surgical History:   Procedure Laterality Date   •  SECTION     • CHOLECYSTECTOMY     • COLONOSCOPY     • DENTAL SURGERY      implants   • UPPER GASTROINTESTINAL ENDOSCOPY       Social History   Social History     Substance and Sexual Activity   Alcohol Use Yes    Comment: occ wine / occasional      Social History     Substance and Sexual Activity   Drug Use No     Social History     Tobacco Use   Smoking Status Former   • Packs/day: 1 00   • Years: 15 00   • Pack years: 15 00   • Types: Cigarettes   • Quit date: 1986   • Years since quittin 9   Smokeless Tobacco Never     Family History   Problem Relation Age of Onset   • No Known Problems Mother    • Coronary artery disease Father    • Colon cancer Sister    • No Known Problems Sister    • No Known Problems Sister    • No Known Problems Sister    • No Known Problems Maternal Grandmother    • No Known Problems Paternal Grandmother    • Esophageal cancer Paternal Aunt        Meds/Allergies       Current Outpatient Medications:   •  esomeprazole (NexIUM) 40 MG capsule  •  cholecalciferol (VITAMIN D3) 1,000 units tablet  •  LYSINE PO    Current Facility-Administered Medications:   •  sodium chloride 0 9 % infusion, 125 mL/hr, Intravenous, Continuous, New Bag at 02/06/23 1221    Allergies   Allergen Reactions   • Ciprofloxacin Vomiting     Reaction Date: [id number]; Objective     /67   Pulse 65   Temp 98 8 °F (37 1 °C) (Skin)   Resp 18   Ht 5' (1 524 m)   Wt 70 3 kg (155 lb)   SpO2 97%   BMI 30 27 kg/m²       PHYSICAL EXAM    Gen: NAD  Head: NCAT  CV: RRR  CHEST: Clear  ABD: soft, NT/ND  EXT: no edema      ASSESSMENT/PLAN:  This is a 74y o  year old female here for EGD colonoscopy, and she is stable and optimized for her procedure

## 2023-02-06 NOTE — ANESTHESIA POSTPROCEDURE EVALUATION
Post-Op Assessment Note    CV Status:  Stable  Pain Score: 0    Pain management: adequate     Mental Status:  Alert and awake   Hydration Status:  Euvolemic   PONV Controlled:  Controlled   Airway Patency:  Patent      Post Op Vitals Reviewed: Yes      Staff: CRNA         No notable events documented      BP   106/57   Temp 98   Pulse  72   Resp   16   SpO2   96

## 2023-06-09 ENCOUNTER — APPOINTMENT (OUTPATIENT)
Dept: RADIOLOGY | Facility: CLINIC | Age: 68
End: 2023-06-09
Payer: COMMERCIAL

## 2023-06-09 ENCOUNTER — TELEPHONE (OUTPATIENT)
Dept: OBGYN CLINIC | Facility: HOSPITAL | Age: 68
End: 2023-06-09

## 2023-06-09 ENCOUNTER — OFFICE VISIT (OUTPATIENT)
Dept: URGENT CARE | Facility: CLINIC | Age: 68
End: 2023-06-09
Payer: COMMERCIAL

## 2023-06-09 VITALS
HEIGHT: 60 IN | OXYGEN SATURATION: 97 % | HEART RATE: 65 BPM | WEIGHT: 141 LBS | BODY MASS INDEX: 27.68 KG/M2 | SYSTOLIC BLOOD PRESSURE: 138 MMHG | DIASTOLIC BLOOD PRESSURE: 65 MMHG

## 2023-06-09 DIAGNOSIS — S76.112A QUADRICEPS MUSCLE RUPTURE, LEFT, INITIAL ENCOUNTER: Primary | ICD-10-CM

## 2023-06-09 DIAGNOSIS — M79.605 PAIN OF LEFT LOWER EXTREMITY: ICD-10-CM

## 2023-06-09 PROCEDURE — 99213 OFFICE O/P EST LOW 20 MIN: CPT | Performed by: FAMILY MEDICINE

## 2023-06-09 PROCEDURE — 73552 X-RAY EXAM OF FEMUR 2/>: CPT

## 2023-06-09 RX ORDER — NAPROXEN 500 MG/1
500 TABLET ORAL 2 TIMES DAILY WITH MEALS
Qty: 14 TABLET | Refills: 0 | Status: SHIPPED | OUTPATIENT
Start: 2023-06-09 | End: 2023-06-16

## 2023-06-09 NOTE — TELEPHONE ENCOUNTER
Caller: [facility]    Doctor: Davi Cardoza    Reason for call: Attempted to schedule appt for patient   1st avail 6/23 patient has to confirm w/transport and will call back to schedule    Call back#: [phone]

## 2023-06-09 NOTE — PROGRESS NOTES
[facility]        NAME: Daniel Burton is a 79 y o  female  : 1955    MRN: [id number]  DATE: 2023  TIME: 12:23 PM    Assessment and Plan   Quadriceps muscle rupture, left, initial encounter [S76 112A]  1  Quadriceps muscle rupture, left, initial encounter  XR femur 2 vw left    Ambulatory Referral to Orthopedic Surgery    naproxen (Naprosyn) 500 mg tablet        No fractures on x-ray; official read pending  Likely sustained a left vastus lateralis rupture  Ace wrap applied to the distal thigh and knee  Patient given a cane for ambulation  Naproxen for pain relief  Referred to orthopedics for further evaluation and management  Patient Instructions     Follow up with PCP in 3-5 days  Proceed to  ER if symptoms worsen  Chief Complaint   No chief complaint on file  History of Present Illness       62-year-old female presents today due to a left lower extremity injury sustained around 9 AM today  Was outside on her front lawn where her dog was on a leash tied to a tree  The dog proceeded to liz her cat and in doing so, the leash hit her in the back causing her to fall forward  She fell forward, she felt a strain in the left thigh prompting her evaluation today  Denies any knee pain, but reports pain and soreness just above the knee on the lateral aspect  Is concerned that she pulled a muscle  Denies any direct trauma to that area  Review of Systems   Review of Systems   Constitutional: Negative for chills and fever  Respiratory: Negative for cough, shortness of breath and wheezing  Cardiovascular: Negative for chest pain  Gastrointestinal: Negative for abdominal pain and nausea  Musculoskeletal: Positive for gait problem and myalgias  Negative for arthralgias and joint swelling  Skin: Negative for color change and rash  Neurological: Negative for dizziness and headaches       Current Medications       Current Outpatient Medications:   •  naproxen (Naprosyn) 500 mg tablet, Take 1 tablet (500 mg total) by mouth 2 (two) times a day with meals for 7 days, Disp: 14 tablet, Rfl: 0  •  cholecalciferol (VITAMIN D3) 1,000 units tablet, Take 1,000 Units by mouth daily (Patient not taking: Reported on 2023), Disp: , Rfl:   •  esomeprazole (NexIUM) 40 MG capsule, Take 1 capsule (40 mg total) by mouth daily before breakfast, Disp: 90 capsule, Rfl: 2  •  LYSINE PO, Take by mouth (Patient not taking: Reported on 2023), Disp: , Rfl:     Current Allergies     Allergies as of 2023 - Reviewed 2023   Allergen Reaction Noted   • Ciprofloxacin Vomiting 2010            The following portions of the patient's history were reviewed and updated as appropriate: allergies, current medications, past family history, past medical history, past social history, past surgical history and problem list      Past Medical History:   Diagnosis Date   • Anxiety    • Anxiety disorder 2009    last assessed 09   • Chronic constipation 2004    last assessed 04; resolved 23   • Depression, recurrent ([facility] [address] ) 10/15/2015   • Gait disturbance 2012    last assessed 12   • GERD (gastroesophageal reflux disease) 2007    last assessed 3/23/07   • History of vertigo     feels well today; has had crystal therapy in past   23   • Hyperlipidemia    • Primary localized osteoarthritis of hip 10/27/2011    last assessed 10/27/11   • Sleep apnea    • Thoracic neuritis 2007    last assessed 07       Past Surgical History:   Procedure Laterality Date   •  SECTION     • CHOLECYSTECTOMY     • COLONOSCOPY     • DENTAL SURGERY      implants   • UPPER GASTROINTESTINAL ENDOSCOPY         Family History   Problem Relation Age of Onset   • No Known Problems Mother    • Coronary artery disease Father    • Colon cancer Sister    • No Known Problems Sister    • No Known Problems Sister    • No Known Problems Sister    • No Known Problems Maternal Grandmother    • No Known Problems Paternal Grandmother    • Esophageal cancer Paternal Aunt          Medications have been verified  Objective   /65   Pulse 65   Ht 5' (1 524 m)   Wt 64 kg (141 lb)   SpO2 97%   BMI 27 54 kg/m²   No LMP recorded  Patient is postmenopausal        Physical Exam     Physical Exam  Vitals and nursing note reviewed  Constitutional:       General: She is in acute distress  Appearance: Normal appearance  She is obese  She is not ill-appearing, toxic-appearing or diaphoretic  HENT:      Head: Normocephalic and atraumatic  Eyes:      General:         Right eye: No discharge  Left eye: No discharge  Conjunctiva/sclera: Conjunctivae normal    Pulmonary:      Effort: Pulmonary effort is normal    Musculoskeletal:         General: Swelling, tenderness, deformity and signs of injury present  Comments: Bulge of the distal vastus lateralis (just above the knee)  Point tenderness over the bulge and proximally  Exquisite pain in the L mid thigh (anterolateral aspect) with resisted knee extension  No overlying ecchymosis  Skin:     General: Skin is warm  Findings: No erythema  Neurological:      General: No focal deficit present  Mental Status: She is alert and oriented to person, place, and time  Psychiatric:         Mood and Affect: Mood normal          Behavior: Behavior normal          Thought Content:  Thought content normal          Judgment: Judgment normal

## 2023-06-13 ENCOUNTER — OFFICE VISIT (OUTPATIENT)
Dept: OBGYN CLINIC | Facility: CLINIC | Age: 68
End: 2023-06-13
Payer: COMMERCIAL

## 2023-06-13 VITALS
HEART RATE: 64 BPM | SYSTOLIC BLOOD PRESSURE: 126 MMHG | HEIGHT: 60 IN | WEIGHT: 141 LBS | BODY MASS INDEX: 27.68 KG/M2 | DIASTOLIC BLOOD PRESSURE: 81 MMHG

## 2023-06-13 DIAGNOSIS — S83.242A OTHER TEAR OF MEDIAL MENISCUS OF LEFT KNEE AS CURRENT INJURY, INITIAL ENCOUNTER: Primary | ICD-10-CM

## 2023-06-13 DIAGNOSIS — S76.112A QUADRICEPS MUSCLE RUPTURE, LEFT, INITIAL ENCOUNTER: ICD-10-CM

## 2023-06-13 DIAGNOSIS — S76.112A TRAUMATIC RUPTURE OF LEFT QUADRICEPS TENDON, INITIAL ENCOUNTER: ICD-10-CM

## 2023-06-13 PROCEDURE — 99213 OFFICE O/P EST LOW 20 MIN: CPT | Performed by: ORTHOPAEDIC SURGERY

## 2023-06-13 NOTE — TELEPHONE ENCOUNTER
See other note Subsequent Stages Histo Method Verbiage: Using a similar technique to that described above, a thin layer of tissue was removed from all areas where tumor was visible on the previous stage.  The tissue was again oriented, mapped, dyed, and processed as above.

## 2023-06-13 NOTE — PROGRESS NOTES
Ortho Sports Medicine New Patient Visit     Assesment:   79 y o  female with left knee contusion, possible left quadriceps strain and meniscus tear    Plan:    Because the patient had a traumatic knee injury 4 days ago with medial joint line tenderness and possible defect in her distal quad, I believe pursuing an MRI to assess for any internal derangement in the knee as well as potential quadriceps injury is warranted  The knee was asymptomatic before this injury  She can follow up after the MRI to discuss next steps  In the meantime, the patient can continue using OTC medications, ice/heat, ACE wrap, and the cane for support and stability  We can also consider a referral to PT, which the patient will consider following MRI review  Follow up: After MRI        Chief Complaint   Patient presents with   • Right Knee - Pain       History of Present Illness: The patient is a 79 y o  female presenting with acute, traumatic left leg pain after her dog ran into her back and she fell forward  The patient cannot recall exactly how she fell, but does not believe she landed directly on the knee  She denies history of knee injury, swelling, locking episodes, instability, knee pain, back pain, or numbness/tingling  She localizes her pain along vastus lateralis musculature and notes bruising that extends from the lateral aspect of the knee to mid-shin  Although ambulating is painful after extended periods, the patient denies gait changes, instability, swelling, locking episodes, or inability to straighten the leg and hold that position  The pain is worst with stairs, such that the patient cannot initiate climbing or descending stairs with the left leg  The patient reports normal knee functionality prior to this injury  She has tried OTC medications and an ACE wrap so far         Knee Surgical History:  None    Past Medical, Social and Family History:  Past Medical History:   Diagnosis Date   • Anxiety    • Anxiety disorder 2009    last assessed 09   • Chronic constipation 2004    last assessed 04; resolved 23   • Depression[facility] ) 10/15/2015   • Gait disturbance 2012    last assessed 12   • GERD (gastroesophageal reflux disease) 2007    last assessed 3/23/07   • History of vertigo     feels well today; has had crystal therapy in past   23   • Hyperlipidemia    • Primary localized osteoarthritis of hip 10/27/2011    last assessed 10/27/11   • Sleep apnea    • Thoracic neuritis 2007    last assessed 07     Past Surgical History:   Procedure Laterality Date   •  SECTION     • CHOLECYSTECTOMY     • COLONOSCOPY     • DENTAL SURGERY      implants   • UPPER GASTROINTESTINAL ENDOSCOPY       Allergies   Allergen Reactions   • Ciprofloxacin Vomiting     Reaction Date: 2010;      Current Outpatient Medications on File Prior to Visit   Medication Sig Dispense Refill   • cholecalciferol (VITAMIN D3) 1,000 units tablet Take 1,000 Units by mouth daily (Patient not taking: Reported on 2023)     • esomeprazole (NexIUM) 40 MG capsule Take 1 capsule (40 mg total) by mouth daily before breakfast 90 capsule 2   • LYSINE PO Take by mouth (Patient not taking: Reported on 2023)     • naproxen (Naprosyn) 500 mg tablet Take 1 tablet (500 mg total) by mouth 2 (two) times a day with meals for 7 days 14 tablet 0     No current facility-administered medications on file prior to visit       Social History     Socioeconomic History   • Marital status: /Civil Union     Spouse name: Not on file   • Number of children: Not on file   • Years of education: Not on file   • Highest education level: Not on file   Occupational History   • Not on file   Tobacco Use   • Smoking status: Former     Packs/day: 1 00     Years: 15 00     Total pack years: 15 00     Types: Cigarettes     Quit date: 1986     Years since quittin 3   • Smokeless tobacco: Never   Vaping Use   • Vaping Use: Never used   Substance and Sexual Activity   • Alcohol use: Yes     Comment: occ wine / occasional    • Drug use: No   • Sexual activity: Not on file   Other Topics Concern   • Not on file   Social History Narrative    Caffeine use      Social Determinants of Health     Financial Resource Strain: Low Risk  (1/16/2023)    Overall Financial Resource Strain (CARDIA)    • Difficulty of Paying Living Expenses: Not hard at all   Food Insecurity: Not on file   Transportation Needs: No Transportation Needs (1/16/2023)    PRAPARE - Transportation    • Lack of Transportation (Medical): No    • Lack of Transportation (Non-Medical): No   Physical Activity: Not on file   Stress: Not on file   Social Connections: Not on file   Intimate Partner Violence: Not on file   Housing Stability: Not on file         I have reviewed the past medical, surgical, social and family history, medications and allergies as documented in the EMR  Review of systems: ROS is negative other than that noted in the HPI  Constitutional: Negative for fatigue and fever  HENT: Negative for sore throat  Respiratory: Negative for shortness of breath  Cardiovascular: Negative for chest pain  Gastrointestinal: Negative for abdominal pain  Endocrine: Negative for cold intolerance and heat intolerance  Genitourinary: Negative for flank pain  Musculoskeletal: Negative for back pain  Skin: Negative for rash  Allergic/Immunologic: Negative for immunocompromised state  Neurological: Negative for dizziness  Psychiatric/Behavioral: Negative for agitation  Physical Exam:    Blood pressure 126/81, pulse 64, height 5' (1 524 m), weight 64 kg (141 lb)      General/Constitutional: NAD, well developed, well nourished  HENT: Normocephalic, atraumatic  CV: Intact distal pulses, regular rate  Resp: No respiratory distress or labored breathing  Abdomen: soft, nondistended   Lymphatic: No lymphadenopathy palpated  Neuro: Alert and Oriented x 3, no focal deficits  Psych: Normal mood, normal affect  Skin: Warm, dry, no rashes, no erythema      Knee Exam:   Ecchymosis over the lateral side of the knee  Mild  Range of motion from 0 to 130 with pain along the vastus lateralis while in flexion  Mild medial joint line tenderness   Knee is stable to varus stress, valgus stress, Lachman, and anterior/posterior drawer  No palpable defect in the distal quad tendon  No patellar tendon tenderness  Able to straight leg raise  No extensor lag  Weakness with resisted extension  Neurovascularly intact distally    Knee Imaging    X-rays of the left femur reviewed from 6/9/2023  These show no acute osseous abnormalities  Mild degenerative changes in hip and knee joints  Superior patellar enthesophyte  Patella well-centered on the trochlea

## 2023-06-26 ENCOUNTER — HOSPITAL ENCOUNTER (OUTPATIENT)
Dept: MRI IMAGING | Facility: HOSPITAL | Age: 68
Discharge: HOME | End: 2023-06-26
Payer: COMMERCIAL

## 2023-06-26 DIAGNOSIS — S83.242A OTHER TEAR OF MEDIAL MENISCUS OF LEFT KNEE AS CURRENT INJURY, INITIAL ENCOUNTER: ICD-10-CM

## 2023-06-26 DIAGNOSIS — S76.112A TRAUMATIC RUPTURE OF LEFT QUADRICEPS TENDON, INITIAL ENCOUNTER: ICD-10-CM

## 2023-06-26 PROCEDURE — 73721 MRI JNT OF LWR EXTRE W/O DYE: CPT

## 2023-06-26 PROCEDURE — G1004 CDSM NDSC: HCPCS

## 2023-06-28 ENCOUNTER — TELEPHONE (OUTPATIENT)
Dept: OBGYN CLINIC | Facility: CLINIC | Age: 68
End: 2023-06-28

## 2023-07-03 NOTE — TELEPHONE ENCOUNTER
Caller: Erik Parra    Doctor: Teja Berg    Reason for call: Returning call, patient unable to come in sooner due to being on vacation.     Call back#: na

## 2023-07-11 ENCOUNTER — OFFICE VISIT (OUTPATIENT)
Dept: OBGYN CLINIC | Facility: CLINIC | Age: 68
End: 2023-07-11
Payer: COMMERCIAL

## 2023-07-11 VITALS
BODY MASS INDEX: 27.68 KG/M2 | WEIGHT: 141 LBS | HEART RATE: 60 BPM | SYSTOLIC BLOOD PRESSURE: 125 MMHG | DIASTOLIC BLOOD PRESSURE: 80 MMHG | HEIGHT: 60 IN

## 2023-07-11 DIAGNOSIS — S83.242D OTHER TEAR OF MEDIAL MENISCUS OF LEFT KNEE AS CURRENT INJURY, SUBSEQUENT ENCOUNTER: Primary | ICD-10-CM

## 2023-07-11 PROCEDURE — 99213 OFFICE O/P EST LOW 20 MIN: CPT | Performed by: ORTHOPAEDIC SURGERY

## 2023-07-11 NOTE — PROGRESS NOTES
Ortho Sports Medicine New Patient Visit     Assesment:   79 y.o. female with left medial meniscus tear and partial vastus lateralis tear    Plan: We reviewed the MRI findings of medial meniscus tear and partial tear of the distal vastus lateralis muscle. Given the patient's significant symptomatic improvement and ability to complete all activities without any issues since her last visit and completing her MRI, I recommended treating her meniscus tear non-operatively at this time. Although the patient does have medial joint line on exam today, she did not previously notice this pain and it is not restricting her in any way. We discussed that the partial quad tear can be treated non-operatively, including with bracing and physical therapy. The patient is not interested in formal PT at this time because she is responsible for watching her grandchildren, but we placed an order today if she decides she would benefit from PT in the future. Wendy Maguire can use OTC medications and ice/heat as needed for pain management. She can plan to follow up as needed if her symptoms worsen or become more persistent. Follow up:    As needed        Chief Complaint   Patient presents with   • Left Knee - Follow-up       History of Present Illness: The patient is a 79 y.o. female presenting for left knee MRI review. The patient reports near complete resolution in her symptoms since last visit, stating she has no restrictions in her activities. She reports some bulging and mild pain around the distal vastus lateralis with deep squatting, both of which have improved overall. The patient denies any new injury, joint effusion, instability, locking episodes, or numbness/tingling. The patient is no longer using OTC medications. She has not attended formal PT as she wanted to consider PT after MRI review if necessary.        Knee Surgical History:  None    Past Medical, Social and Family History:  Past Medical History:   Diagnosis Date   • Anxiety    • Anxiety disorder 2009    last assessed 09   • Chronic constipation 2004    last assessed 04; resolved 23   • Depression, recurrent ([address]) 10/15/2015   • Gait disturbance 2012    last assessed 12   • GERD (gastroesophageal reflux disease) 2007    last assessed 3/23/07   • History of vertigo     feels well today; has had crystal therapy in past.  23   • Hyperlipidemia    • Primary localized osteoarthritis of hip 10/27/2011    last assessed 10/27/11   • Sleep apnea    • Thoracic neuritis 2007    last assessed 07     Past Surgical History:   Procedure Laterality Date   •  SECTION     • CHOLECYSTECTOMY     • COLONOSCOPY     • DENTAL SURGERY      implants   • UPPER GASTROINTESTINAL ENDOSCOPY       Allergies   Allergen Reactions   • Ciprofloxacin Vomiting     Reaction Date: 2010;      Current Outpatient Medications on File Prior to Visit   Medication Sig Dispense Refill   • cholecalciferol (VITAMIN D3) 1,000 units tablet Take 1,000 Units by mouth daily (Patient not taking: Reported on 2023)     • esomeprazole (NexIUM) 40 MG capsule Take 1 capsule (40 mg total) by mouth daily before breakfast 90 capsule 2   • LYSINE PO Take by mouth (Patient not taking: Reported on 2023)     • naproxen (Naprosyn) 500 mg tablet Take 1 tablet (500 mg total) by mouth 2 (two) times a day with meals for 7 days 14 tablet 0     No current facility-administered medications on file prior to visit.      Social History     Socioeconomic History   • Marital status: /Civil Union     Spouse name: Not on file   • Number of children: Not on file   • Years of education: Not on file   • Highest education level: Not on file   Occupational History   • Not on file   Tobacco Use   • Smoking status: Former     Packs/day: 1.00     Years: 15.00     Total pack years: 15.00     Types: Cigarettes     Quit date: 1986     Years since quittin.4   • Smokeless tobacco: Never   Vaping Use   • Vaping Use: Never used   Substance and Sexual Activity   • Alcohol use: Yes     Comment: occ wine / occasional    • Drug use: No   • Sexual activity: Not on file   Other Topics Concern   • Not on file   Social History Narrative    Caffeine use      Social Determinants of Health     Financial Resource Strain: Low Risk  (1/16/2023)    Overall Financial Resource Strain (CARDIA)    • Difficulty of Paying Living Expenses: Not hard at all   Food Insecurity: Not on file   Transportation Needs: No Transportation Needs (1/16/2023)    PRAPARE - Transportation    • Lack of Transportation (Medical): No    • Lack of Transportation (Non-Medical): No   Physical Activity: Not on file   Stress: Not on file   Social Connections: Not on file   Intimate Partner Violence: Not on file   Housing Stability: Not on file         I have reviewed the past medical, surgical, social and family history, medications and allergies as documented in the EMR. Review of systems: ROS is negative other than that noted in the HPI. Constitutional: Negative for fatigue and fever. HENT: Negative for sore throat. Respiratory: Negative for shortness of breath. Cardiovascular: Negative for chest pain. Gastrointestinal: Negative for abdominal pain. Endocrine: Negative for cold intolerance and heat intolerance. Genitourinary: Negative for flank pain. Musculoskeletal: Negative for back pain. Skin: Negative for rash. Allergic/Immunologic: Negative for immunocompromised state. Neurological: Negative for dizziness. Psychiatric/Behavioral: Negative for agitation. Physical Exam:    Blood pressure 125/80, pulse 60, height 5' (1.524 m), weight 64 kg (141 lb).     General/Constitutional: NAD, well developed, well nourished  HENT: Normocephalic, atraumatic  CV: Intact distal pulses, regular rate  Resp: No respiratory distress or labored breathing  Abdomen: soft, nondistended   Lymphatic: No lymphadenopathy palpated  Neuro: Alert and Oriented x 3, no focal deficits  Psych: Normal mood, normal affect  Skin: Warm, dry, no rashes, no erythema      Knee Exam:   No ecchymosis, skin lesions, wounds, or deformity. Range of motion from 0 to 130 without pain  Mild medial joint line tenderness   Knee is stable to varus stress, valgus stress, Lachman, anterior drawer, and posterior drawer. No palpable defect in the distal quad tendon. No patellar tendon or vastus medialis tenderness. Able to straight leg raise without extensor lag.   5/5 knee flexion and extension  Neurovascularly intact distally    Knee Imaging    MRI of the left knee reviewed from 6/26/2023, which demonstrates a small horizontal tear of the medial meniscus patrick partial tear of the distal vastus lateralis.

## 2023-10-09 DIAGNOSIS — K22.70 BARRETT'S ESOPHAGUS WITHOUT DYSPLASIA: ICD-10-CM

## 2023-10-09 DIAGNOSIS — K21.9 GASTROESOPHAGEAL REFLUX DISEASE WITHOUT ESOPHAGITIS: ICD-10-CM

## 2023-10-09 DIAGNOSIS — R10.13 EPIGASTRIC PAIN: ICD-10-CM

## 2023-10-09 RX ORDER — ESOMEPRAZOLE MAGNESIUM 40 MG/1
40 CAPSULE, DELAYED RELEASE ORAL
Qty: 90 CAPSULE | Refills: 1 | Status: SHIPPED | OUTPATIENT
Start: 2023-10-09

## 2023-10-09 NOTE — TELEPHONE ENCOUNTER
Reason for call:   [x] Refill   [] Prior Auth  [] Other:     Office:   [] PCP/Provider -   [x] Speciality/Provider -To [facility]    Medication:esoeprazole  Dose/Frequency: 40 mg daily    Quantity:90    Pharmacy: [id number] [facility]  Does the patient have enough for 3 days?    [x] Yes   [] No - Send as HP to POD

## 2024-04-08 DIAGNOSIS — R10.13 EPIGASTRIC PAIN: ICD-10-CM

## 2024-04-08 DIAGNOSIS — K22.70 BARRETT'S ESOPHAGUS WITHOUT DYSPLASIA: ICD-10-CM

## 2024-04-08 DIAGNOSIS — K21.9 GASTROESOPHAGEAL REFLUX DISEASE WITHOUT ESOPHAGITIS: ICD-10-CM

## 2024-04-08 NOTE — TELEPHONE ENCOUNTER
Reason for call:   [x] Refill   [] Prior Auth  [] Other:     Office:   [] PCP/Provider -   [x] Specialty/Provider -     Medication: esomeprazole (NexIUM) 40 MG capsule     Dose/Frequency: 40 mg, Oral, Daily before breakfast     Quantity: 90    Pharmacy:  [facility] #[id number] - [address]     Does the patient have enough for 3 days?   [x] Yes   [] No - Send as HP to POD

## 2024-04-09 RX ORDER — ESOMEPRAZOLE MAGNESIUM 40 MG/1
40 CAPSULE, DELAYED RELEASE ORAL
Qty: 30 CAPSULE | Refills: 0 | Status: SHIPPED | OUTPATIENT
Start: 2024-04-09

## 2024-04-10 ENCOUNTER — TELEPHONE (OUTPATIENT)
Age: 69
End: 2024-04-10

## 2024-04-10 DIAGNOSIS — K21.9 GASTROESOPHAGEAL REFLUX DISEASE WITHOUT ESOPHAGITIS: Primary | ICD-10-CM

## 2024-04-10 RX ORDER — ESOMEPRAZOLE MAGNESIUM 40 MG/1
40 CAPSULE, DELAYED RELEASE ORAL DAILY
Qty: 90 CAPSULE | Refills: 1 | Status: SHIPPED | OUTPATIENT
Start: 2024-04-10

## 2024-04-10 NOTE — TELEPHONE ENCOUNTER
Patients GI provider:  Dr. Alvarado    Number to return call: [phone]    Reason for call: Pt calling stating she was supposed to get 3 month prescription for esomeprazole 40 mg. Pt states she is going away and will need another script sent in.  Script can be sent to [facility] #[id number].    Scheduled procedure/appointment date if applicable: Apt/ 9/19/24

## 2024-05-28 ENCOUNTER — OFFICE VISIT (OUTPATIENT)
Dept: URGENT CARE | Facility: CLINIC | Age: 69
End: 2024-05-28
Payer: COMMERCIAL

## 2024-05-28 VITALS
BODY MASS INDEX: 27.48 KG/M2 | WEIGHT: 140 LBS | HEART RATE: 74 BPM | DIASTOLIC BLOOD PRESSURE: 86 MMHG | SYSTOLIC BLOOD PRESSURE: 144 MMHG | RESPIRATION RATE: 16 BRPM | TEMPERATURE: 97.8 F | OXYGEN SATURATION: 98 % | HEIGHT: 60 IN

## 2024-05-28 DIAGNOSIS — R00.0 TACHYCARDIA: Primary | ICD-10-CM

## 2024-05-28 LAB
ATRIAL RATE: 53 BPM
ATRIAL RATE: 53 BPM
P AXIS: 31 DEGREES
P AXIS: 31 DEGREES
PR INTERVAL: 156 MS
PR INTERVAL: 156 MS
QRS AXIS: 31 DEGREES
QRS AXIS: 31 DEGREES
QRSD INTERVAL: 74 MS
QRSD INTERVAL: 74 MS
QT INTERVAL: 436 MS
QT INTERVAL: 436 MS
QTC INTERVAL: 409 MS
QTC INTERVAL: 409 MS
T WAVE AXIS: 32 DEGREES
T WAVE AXIS: 32 DEGREES
VENTRICULAR RATE: 53 BPM
VENTRICULAR RATE: 53 BPM

## 2024-05-28 PROCEDURE — 99213 OFFICE O/P EST LOW 20 MIN: CPT | Performed by: PHYSICIAN ASSISTANT

## 2024-05-28 PROCEDURE — 93010 ELECTROCARDIOGRAM REPORT: CPT | Performed by: INTERNAL MEDICINE

## 2024-05-28 NOTE — PATIENT INSTRUCTIONS
Follow-up with your PCP or cardiology.  I recommend that you get a Holter monitor to monitor for these events.  If you develop any chest pain, shortness of breath or other symptoms associated with your high heart rate call 911 and go to the ER.

## 2024-05-28 NOTE — PROGRESS NOTES
[facility]        NAME: Stacey Jacobo is a 69 y.o. female  : 1955    MRN: [id number]  DATE: May 28, 2024  TIME: 11:26 AM    Assessment and Plan   Tachycardia [R00.0]  1. Tachycardia  Ambulatory Referral to Cardiology    ECG 12 lead        Heart rate 74 in the office today.  Heart is regular rate and rhythm.  Patient is currently and has been asymptomatic.  I recommend she follow-up with PCP or cardiology for a 24-hour Holter monitor.  Discussed in depth that if she develops any new symptoms she needs to call 911 and go to the emergency room.    Patient Instructions     Patient Instructions   Follow-up with your PCP or cardiology.  I recommend that you get a Holter monitor to monitor for these events.  If you develop any chest pain, shortness of breath or other symptoms associated with your high heart rate call 911 and go to the ER.       Follow up with PCP in 3-5 days.  Proceed to  ER if symptoms worsen.    Chief Complaint     Chief Complaint   Patient presents with    Rapid Heart Rate     Pt states that her watch alerted her to a hr of 111. No other symptoms present.          History of Present Illness       The patient is a 69-year-old female with a past medical history of Xiong's esophagus presenting today after her Apple Watch told her her heart rate was 111 this morning.  She admits that she got an Apple Watch about 2 weeks ago.  Since then she has been noticing high and low heart rates.  Reports a low of 52 and a high of 174.  She also reports being told her oxygen saturations were low.  She reports being overall healthy and exercises for 45 minutes a day.  She takes exercise classes.  These episodes of tachycardia have not been associated with exercise.  She denies having any symptoms at all.  Denies any chest pain, shortness of breath, nausea or vomiting.  When her watch tells her that her heart rate is elevated she does not feel symptoms.  Denies any history of anxiety.  Denies any  gait or balance issues.            Review of Systems   Review of Systems   Constitutional:  Negative for activity change, appetite change, chills, fatigue and fever.   HENT:  Negative for congestion, ear pain, rhinorrhea, sinus pressure, sinus pain and sore throat.    Eyes:  Negative for pain and visual disturbance.   Respiratory:  Negative for cough, chest tightness and shortness of breath.    Cardiovascular:  Negative for chest pain and palpitations.   Gastrointestinal:  Negative for abdominal pain, diarrhea, nausea and vomiting.   Genitourinary:  Negative for dysuria and hematuria.   Musculoskeletal:  Negative for arthralgias, back pain and myalgias.   Skin:  Negative for color change, pallor and rash.   Neurological:  Negative for seizures, syncope and headaches.   All other systems reviewed and are negative.        Current Medications       Current Outpatient Medications:     esomeprazole (NexIUM) 40 MG capsule, Take 1 capsule (40 mg total) by mouth daily before breakfast, Disp: 30 capsule, Rfl: 0    cholecalciferol (VITAMIN D3) 1,000 units tablet, Take 1,000 Units by mouth daily (Patient not taking: Reported on 1/16/2023), Disp: , Rfl:     esomeprazole (NexIUM) 40 MG capsule, Take 1 capsule (40 mg total) by mouth in the morning (Patient not taking: Reported on 5/28/2024), Disp: 90 capsule, Rfl: 1    LYSINE PO, Take by mouth (Patient not taking: Reported on 1/16/2023), Disp: , Rfl:     naproxen (Naprosyn) 500 mg tablet, Take 1 tablet (500 mg total) by mouth 2 (two) times a day with meals for 7 days, Disp: 14 tablet, Rfl: 0    Current Allergies     Allergies as of 05/28/2024 - Reviewed 05/28/2024   Allergen Reaction Noted    Ciprofloxacin Vomiting 03/25/2010            The following portions of the patient's history were reviewed and updated as appropriate: allergies, current medications, past family history, past medical history, past social history, past surgical history and problem list.     Past Medical  History:   Diagnosis Date    Anxiety     Anxiety disorder 2009    last assessed 09    Chronic constipation 2004    last assessed 04; resolved 23    Depression, recurrent ([facility]) 10/15/2015    Gait disturbance 2012    last assessed 12    GERD (gastroesophageal reflux disease) 2007    last assessed 3/23/07    History of vertigo     feels well today; has had crystal therapy in past.  23    Hyperlipidemia     Primary localized osteoarthritis of hip 10/27/2011    last assessed 10/27/11    Sleep apnea     Thoracic neuritis 2007    last assessed 07       Past Surgical History:   Procedure Laterality Date     SECTION      CHOLECYSTECTOMY      COLONOSCOPY      DENTAL SURGERY      implants    UPPER GASTROINTESTINAL ENDOSCOPY         Family History   Problem Relation Age of Onset    No Known Problems Mother     Coronary artery disease Father     Colon cancer Sister     No Known Problems Sister     No Known Problems Sister     No Known Problems Sister     No Known Problems Maternal Grandmother     No Known Problems Paternal Grandmother     Esophageal cancer Paternal Aunt          Medications have been verified.        Objective   /86   Pulse 74   Temp 97.8 °F (36.6 °C)   Resp 16   Ht 5' (1.524 m)   Wt 63.5 kg (140 lb)   SpO2 98%   BMI 27.34 kg/m²        Physical Exam     Physical Exam  Vitals and nursing note reviewed.   Constitutional:       General: She is not in acute distress.     Appearance: Normal appearance. She is normal weight. She is not ill-appearing, toxic-appearing or diaphoretic.   HENT:      Head: Normocephalic and atraumatic.   Cardiovascular:      Rate and Rhythm: Normal rate and regular rhythm.      Heart sounds: Normal heart sounds. No murmur heard.     No friction rub. No gallop.   Pulmonary:      Effort: Pulmonary effort is normal. No respiratory distress.      Breath sounds: Normal breath sounds. No stridor. No wheezing, rhonchi  or rales.   Chest:      Chest wall: No tenderness.   Skin:     General: Skin is warm and dry.      Capillary Refill: Capillary refill takes less than 2 seconds.   Neurological:      Mental Status: She is alert.

## 2024-06-14 ENCOUNTER — TELEPHONE (OUTPATIENT)
Age: 69
End: 2024-06-14

## 2024-06-14 ENCOUNTER — OFFICE VISIT (OUTPATIENT)
Dept: FAMILY MEDICINE CLINIC | Facility: CLINIC | Age: 69
End: 2024-06-14
Payer: COMMERCIAL

## 2024-06-14 VITALS
SYSTOLIC BLOOD PRESSURE: 110 MMHG | OXYGEN SATURATION: 98 % | TEMPERATURE: 98 F | WEIGHT: 140 LBS | HEIGHT: 60 IN | DIASTOLIC BLOOD PRESSURE: 62 MMHG | HEART RATE: 72 BPM | RESPIRATION RATE: 14 BRPM | BODY MASS INDEX: 27.48 KG/M2

## 2024-06-14 DIAGNOSIS — R00.2 PALPITATIONS: Primary | ICD-10-CM

## 2024-06-14 DIAGNOSIS — S46.212S STRAIN OF LEFT BICEPS, SEQUELA: ICD-10-CM

## 2024-06-14 DIAGNOSIS — M25.512 ACUTE PAIN OF LEFT SHOULDER: ICD-10-CM

## 2024-06-14 DIAGNOSIS — R00.2 PALPITATIONS: ICD-10-CM

## 2024-06-14 DIAGNOSIS — Z12.31 ENCOUNTER FOR SCREENING MAMMOGRAM FOR MALIGNANT NEOPLASM OF BREAST: Primary | ICD-10-CM

## 2024-06-14 DIAGNOSIS — M46.1 SACROILIITIS (HCC): ICD-10-CM

## 2024-06-14 PROCEDURE — 99214 OFFICE O/P EST MOD 30 MIN: CPT | Performed by: FAMILY MEDICINE

## 2024-06-14 PROCEDURE — G2211 COMPLEX E/M VISIT ADD ON: HCPCS | Performed by: FAMILY MEDICINE

## 2024-06-14 RX ORDER — METHYLPREDNISOLONE 4 MG/1
TABLET ORAL
Qty: 21 EACH | Refills: 0 | Status: SHIPPED | OUTPATIENT
Start: 2024-06-14

## 2024-06-14 NOTE — TELEPHONE ENCOUNTER
Spoke to patient.  At this time given that there is no availability of the Holter monitor she will be seeing the cardiologist on Monday and we will discuss more in detail at that time.  Patient agrees with my plan

## 2024-06-14 NOTE — PROGRESS NOTES
Stacey Jacobo 1955 female MRN: [id number]    [facility] PRACTICE OFFICE VISIT  [facility] - [facility]      ASSESSMENT/PLAN  Stacey Jacobo is a 69 y.o. female presents to the office for    Diagnoses and all orders for this visit:    Encounter for screening mammogram for malignant neoplasm of breast  -     Mammo screening bilateral w 3d & cad; Future    Strain of left biceps, sequela  -     Ambulatory Referral to Physical Therapy; Future  -     methylPREDNISolone 4 MG tablet therapy pack; Use as directed on package    Acute pain of left shoulder  -     Ambulatory Referral to Physical Therapy; Future  -     methylPREDNISolone 4 MG tablet therapy pack; Use as directed on package    Sacroiliitis (HCC)    Palpitations  -     Holter monitor; Future       Left shoulder/bicep seems to be more tendinitis recommend a Medrol pack to be used as well as physical therapy.  Patient will be going to [facility] for physical therapy.  Patient with acute sacroiliitis that is currently improved  Reviewed urgent care visit.  And reviewed patient's Apple Watch.  Her heart rate goes from 75-1 75 at times.  And is symptomatic I did recommend that a Holter monitor be placed.  Information given to her today.  She is already scheduled to see the cardiologist after 2 weeks of waiting.    Advised her in the future if Anyone tells her that I do not have an appointment to let them know that I will see her at any time           Future Appointments   Date Time Provider Department Center   6/17/2024 11:00 AM Nathaniel Campos MD CARD BE Practice[facility]   9/19/2024  1:00 PM Arun Alvarado MD GI TR 41 Practice-[facility]          SUBJECTIVE  CC: Pain (Pt c/o left upper arm pain x1 month)      HPI:  Stacey Jacobo is a 69 y.o. female who presents for an acute appointment.  Left upper arm x 1 month patient does walk her dog with that arm as well as doing workouts at the gym because she is trying to lose weight.  States that  it is very painful and unable to abduct or adduct that arm.  Patient states that the pain is not being controlled with over-the-counter medications.  Has a history of SI joint tenderness currently stable.  Patient states that she was seen at the urgent care because when she was in [place] her heart rate increased to the 170s.  Patient was concerned went to urgent care and was advised to follow-up with her PCP unfortunately she was advised that I do not follow-up and prescribe Holter monitors therefore she has been waiting to see a cardiologist which she has an appointment upcoming on Monday        Review of Systems   Constitutional:  Negative for activity change, appetite change, chills, fatigue and fever.   HENT:  Negative for congestion.    Respiratory:  Negative for cough, chest tightness and shortness of breath.    Cardiovascular:  Positive for palpitations. Negative for chest pain and leg swelling.   Gastrointestinal:  Negative for abdominal distention, abdominal pain, constipation, diarrhea, nausea and vomiting.   Musculoskeletal:  Positive for arthralgias.   All other systems reviewed and are negative.      Historical Information   The patient history was reviewed as follows:  Past Medical History:   Diagnosis Date   • Anxiety    • Anxiety disorder 01/27/2009    last assessed 1/27/09   • Chronic constipation 04/22/2004    last assessed 4/22/04; resolved 2/6/23   • Depression, recurrent (HCC) 10/15/2015   • Gait disturbance 07/12/2012    last assessed 7/12/12   • GERD (gastroesophageal reflux disease) 03/23/2007    last assessed 3/23/07   • History of vertigo     feels well today; has had crystal therapy in past.  2/6/23   • Hyperlipidemia    • Primary localized osteoarthritis of hip 10/27/2011    last assessed 10/27/11   • Sleep apnea    • 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    last assessed 11/14/07         Medications:     Current Outpatient Medications:   •  esomeprazole (NexIUM) 40 MG capsule, Take 1 capsule (40  mg total) by mouth daily before breakfast, Disp: 30 capsule, Rfl: 0  •  methylPREDNISolone 4 MG tablet therapy pack, Use as directed on package, Disp: 21 each, Rfl: 0  •  cholecalciferol (VITAMIN D3) 1,000 units tablet, Take 1,000 Units by mouth daily (Patient not taking: Reported on 1/16/2023), Disp: , Rfl:   •  LYSINE PO, Take by mouth (Patient not taking: Reported on 1/16/2023), Disp: , Rfl:   •  naproxen (Naprosyn) 500 mg tablet, Take 1 tablet (500 mg total) by mouth 2 (two) times a day with meals for 7 days, Disp: 14 tablet, Rfl: 0    Allergies   Allergen Reactions   • Ciprofloxacin Vomiting     Reaction Date: 25Mar2010;        OBJECTIVE  Vitals:   Vitals:    06/14/24 0952   BP: 110/62   BP Location: Left arm   Patient Position: Sitting   Cuff Size: Standard   Pulse: 72   Resp: 14   Temp: 98 °F (36.7 °C)   TempSrc: Temporal   SpO2: 98%   Weight: 63.5 kg (140 lb)   Height: 5' (1.524 m)         Physical Exam  Vitals reviewed.   Constitutional:       Appearance: She is well-developed.   HENT:      Head: Normocephalic and atraumatic.   Eyes:      Extraocular Movements: EOM normal.      Conjunctiva/sclera: Conjunctivae normal.      Pupils: Pupils are equal, round, and reactive to light.   Cardiovascular:      Rate and Rhythm: Normal rate and regular rhythm.      Heart sounds: Normal heart sounds, S1 normal and S2 normal. No murmur heard.  Pulmonary:      Effort: Pulmonary effort is normal. No respiratory distress.      Breath sounds: Normal breath sounds. No wheezing.   Musculoskeletal:         General: No edema. Normal range of motion.      Cervical back: Normal range of motion and neck supple.      Comments: Tenderness and swelling over the left bicep limited range of motion abducting and abducting secondary to pain   Skin:     General: Skin is warm.   Neurological:      Mental Status: She is alert and oriented to person, place, and time.   Psychiatric:         Mood and Affect: Mood and affect normal.          Speech: Speech normal.         Behavior: Behavior normal.         Thought Content: Thought content normal.         Judgment: Judgment normal.                    Marilu Barrera MD,   [facility]  6/14/2024

## 2024-06-14 NOTE — TELEPHONE ENCOUNTER
PT wanted to let PCP know that she went to obtain a Holter monitor and was told that because PCP did not list this as urgent they have to give her the next available and that is on Monday. Pt states she mell be seeing cardiology on Monday; so she wanted  PCP input on this situation.Please advise.

## 2024-06-14 NOTE — TELEPHONE ENCOUNTER
I change the order to stat so that she gets it immediately given that she has been having high numbers on her watch

## 2024-06-14 NOTE — TELEPHONE ENCOUNTER
Pt called to make Dr Barrera aware she went to [facility] to  a holter monitor after the order was changed to stat and they did not have any available.  She is wondering what she should do now.  Attempted to reach out to office, but was unsuccessful.  Please advise patient

## 2024-06-17 ENCOUNTER — CONSULT (OUTPATIENT)
Dept: CARDIOLOGY CLINIC | Facility: CLINIC | Age: 69
End: 2024-06-17
Payer: COMMERCIAL

## 2024-06-17 VITALS
HEIGHT: 60 IN | DIASTOLIC BLOOD PRESSURE: 64 MMHG | WEIGHT: 139.9 LBS | BODY MASS INDEX: 27.47 KG/M2 | OXYGEN SATURATION: 94 % | SYSTOLIC BLOOD PRESSURE: 120 MMHG | HEART RATE: 61 BPM

## 2024-06-17 DIAGNOSIS — R00.0 TACHYCARDIA: ICD-10-CM

## 2024-06-17 PROCEDURE — 99203 OFFICE O/P NEW LOW 30 MIN: CPT | Performed by: INTERNAL MEDICINE

## 2024-06-17 NOTE — ASSESSMENT & PLAN NOTE
She has asymptomatic episodes of tachycardia noted on her Fitbit.  Heart rates can be as high as 174 on 1 occasion, most of them seem to be between 120 and 140 bpm  They do not seem to be particularly prolonged  She did go to the ER, workup there was unremarkable including EKG.  She is extremely functional, goes to the gym frequently, has no exertional symptoms, her exam is normal.  We did discuss the fact that atrial fibrillation or atrial flutter would be the one arrhythmia we would be most concerned about because this would add significant risk such as stroke risk.  However there are multiple other arrhythmias that could be likely including SVT, PAT, and discussed the fact that a Zio patch 1 week heart monitor may be more advantageous than a Holter monitor.

## 2024-06-17 NOTE — PROGRESS NOTES
[facility]  Office Consultation  Stacey Jacobo 69 y.o. female MRN: [id number]        1. Tachycardia  Assessment & Plan:  She has asymptomatic episodes of tachycardia noted on her Fitbit.  Heart rates can be as high as 174 on 1 occasion, most of them seem to be between 120 and 140 bpm  They do not seem to be particularly prolonged  She did go to the ER, workup there was unremarkable including EKG.  She is extremely functional, goes to the gym frequently, has no exertional symptoms, her exam is normal.  We did discuss the fact that atrial fibrillation or atrial flutter would be the one arrhythmia we would be most concerned about because this would add significant risk such as stroke risk.  However there are multiple other arrhythmias that could be likely including SVT, PAT, and discussed the fact that a Zio patch 1 week heart monitor may be more advantageous than a Holter monitor.  Orders:  -     Ambulatory Referral to Cardiology  -     AMB extended holter monitor; Future  -     TSH, 3rd generation with Free T4 reflex; Future      Plan    Will proceed with a 1 week Zio patch, it will be mailed to her house, we discussed application, keeping it dry for the first 24 hours, and mailing it back in when it is completed after 7 days.  I will discuss follow-up with her once we get the results back and have an idea as to what is causing the episodes of tachycardia.  I would like her to obtain a TSH.      Reason for Consult / Principal Problem: Asymptomatic tachycardia    HPI: Stacey Jacobo is a 69 y.o. year old female with obstructive sleep apnea, very mild hypercholesterolemia, normotension, and a very active lifestyle heavily engaged with her grandchildren, and goes to the gym multiple days a week performing asymptomatic exercise up to 45 minutes cardio and weightlifting, comes to see me to evaluate episodes of elevated heart rate/tachycardia noted on her Fitbit which she recently obtained.  In review of  her Fitbit data it appears she has elevated heart rates that are episodic, brief, asymptomatic, anywhere from 120-174 bpm detected by her Fitbit.  She recently hurt her left shoulder, she has been diagnosed with a tendinitis, she tells me she was prescribed steroids, she has taken a few of those.  She has not used any significant NSAIDs.  She is having mostly discomfort at night when sleeping.        Review of Systems   Constitutional:  Negative for appetite change, diaphoresis, fatigue and fever.   Respiratory:  Negative for chest tightness, shortness of breath and wheezing.    Cardiovascular:  Negative for chest pain, palpitations and leg swelling.   Gastrointestinal:  Negative for abdominal pain and blood in stool.   Musculoskeletal:  Negative for arthralgias and joint swelling.   Skin:  Negative for rash.   Neurological:  Negative for dizziness, syncope and light-headedness.   All other systems reviewed and are negative.        Past Medical History:   Diagnosis Date   • Anxiety    • Anxiety disorder 2009    last assessed 09   • Chronic constipation 2004    last assessed 04; resolved 23   • Depression, recurrent ([facility]) 10/15/2015   • Gait disturbance 2012    last assessed 12   • GERD (gastroesophageal reflux disease) 2007    last assessed 3/23/07   • History of vertigo     feels well today; has had crystal therapy in past.  23   • Hyperlipidemia    • Primary localized osteoarthritis of hip 10/27/2011    last assessed 10/27/11   • Sleep apnea    • Thoracic neuritis 2007    last assessed 07     Past Surgical History:   Procedure Laterality Date   •  SECTION     • CHOLECYSTECTOMY     • COLONOSCOPY     • DENTAL SURGERY      implants   • UPPER GASTROINTESTINAL ENDOSCOPY       Social History     Substance and Sexual Activity   Alcohol Use Yes    Comment: occ wine / occasional      Social History     Substance and Sexual Activity   Drug Use No     Social  History     Tobacco Use   Smoking Status Former   • Current packs/day: 0.00   • Average packs/day: 1 pack/day for 15.0 years (15.0 ttl pk-yrs)   • Types: Cigarettes   • Start date: 1971   • Quit date: 1986   • Years since quittin.3   Smokeless Tobacco Never     Family History   Problem Relation Age of Onset   • No Known Problems Mother    • Coronary artery disease Father    • Colon cancer Sister    • No Known Problems Sister    • No Known Problems Sister    • No Known Problems Sister    • No Known Problems Maternal Grandmother    • No Known Problems Paternal Grandmother    • Esophageal cancer Paternal Aunt        Allergies:  Allergies   Allergen Reactions   • Ciprofloxacin Vomiting     Reaction Date: 2010;        Medications:     Current Outpatient Medications:   •  esomeprazole (NexIUM) 40 MG capsule, Take 1 capsule (40 mg total) by mouth daily before breakfast, Disp: 30 capsule, Rfl: 0  •  methylPREDNISolone 4 MG tablet therapy pack, Use as directed on package, Disp: 21 each, Rfl: 0  •  cholecalciferol (VITAMIN D3) 1,000 units tablet, Take 1,000 Units by mouth daily (Patient not taking: Reported on 2023), Disp: , Rfl:   •  LYSINE PO, Take by mouth (Patient not taking: Reported on 2023), Disp: , Rfl:   ?    Vitals:    24 1056   BP: 120/64   Pulse: 61   SpO2: 94%     Weight (last 2 days)     Date/Time Weight    24 1056 63.5 (139.9)        Physical Exam  Constitutional:       General: She is not in acute distress.     Appearance: She is well-developed. She is not diaphoretic.   HENT:      Head: Normocephalic and atraumatic.   Eyes:      General: No scleral icterus.     Conjunctiva/sclera: Conjunctivae normal.      Pupils: Pupils are equal, round, and reactive to light.   Neck:      Thyroid: No thyromegaly.      Vascular: No JVD.      Trachea: No tracheal deviation.   Cardiovascular:      Rate and Rhythm: Normal rate and regular rhythm.      Heart sounds: Normal heart sounds.  "No murmur heard.     No friction rub. No gallop.   Pulmonary:      Effort: Pulmonary effort is normal. No respiratory distress.      Breath sounds: Normal breath sounds. No wheezing or rales.   Abdominal:      General: Bowel sounds are normal. There is no distension.      Palpations: Abdomen is soft.      Tenderness: There is no abdominal tenderness.   Musculoskeletal:         General: No tenderness or deformity. Normal range of motion.      Cervical back: Normal range of motion and neck supple.      Right lower leg: No edema.      Left lower leg: No edema.   Skin:     General: Skin is warm and dry.      Findings: No erythema or rash.   Neurological:      Mental Status: She is alert and oriented to person, place, and time.      Cranial Nerves: No cranial nerve deficit.   Psychiatric:         Judgment: Judgment normal.           Laboratory Studies:    Laboratory studies personally reviewed    Cardiac testing:     EKG reviewed personally:   No results found for this visit on 06/17/24.  5/28/2024-sinus bradycardia, normal EKG, obtained in the ER.    Echocardiogram:      Stress test:      Holter:      Cardiac catheterization:        Nathaniel Campos MD    Portions of the record may have been created with voice recognition software.  Occasional wrong word or \"sound a like\" substitutions may have occurred due to the inherent limitations of voice recognition software.  Read the chart carefully and recognize, using context, where substitutions have occurred.  "

## 2024-06-19 ENCOUNTER — TELEPHONE (OUTPATIENT)
Dept: CARDIOLOGY CLINIC | Facility: CLINIC | Age: 69
End: 2024-06-19

## 2024-06-20 ENCOUNTER — APPOINTMENT (OUTPATIENT)
Dept: LAB | Facility: CLINIC | Age: 69
End: 2024-06-20
Payer: COMMERCIAL

## 2024-06-20 DIAGNOSIS — R00.0 TACHYCARDIA: ICD-10-CM

## 2024-06-20 LAB — TSH SERPL DL<=0.05 MIU/L-ACNC: 1.99 UIU/ML (ref 0.45–4.5)

## 2024-06-20 PROCEDURE — 36415 COLL VENOUS BLD VENIPUNCTURE: CPT

## 2024-06-20 PROCEDURE — 84443 ASSAY THYROID STIM HORMONE: CPT

## 2024-07-11 ENCOUNTER — CLINICAL SUPPORT (OUTPATIENT)
Dept: CARDIOLOGY CLINIC | Facility: CLINIC | Age: 69
End: 2024-07-11
Payer: COMMERCIAL

## 2024-07-11 DIAGNOSIS — R00.0 TACHYCARDIA: ICD-10-CM

## 2024-07-11 PROCEDURE — 93244 EXT ECG>48HR<7D REV&INTERPJ: CPT | Performed by: INTERNAL MEDICINE

## 2024-08-08 ENCOUNTER — TELEPHONE (OUTPATIENT)
Age: 69
End: 2024-08-08

## 2024-08-08 DIAGNOSIS — K21.9 GASTROESOPHAGEAL REFLUX DISEASE WITHOUT ESOPHAGITIS: ICD-10-CM

## 2024-08-08 DIAGNOSIS — K22.70 BARRETT'S ESOPHAGUS WITHOUT DYSPLASIA: ICD-10-CM

## 2024-08-08 DIAGNOSIS — R10.13 EPIGASTRIC PAIN: ICD-10-CM

## 2024-08-08 RX ORDER — ESOMEPRAZOLE MAGNESIUM 40 MG/1
40 CAPSULE, DELAYED RELEASE ORAL
Qty: 30 CAPSULE | Refills: 1 | Status: SHIPPED | OUTPATIENT
Start: 2024-08-08 | End: 2024-08-08 | Stop reason: SDUPTHER

## 2024-08-08 NOTE — TELEPHONE ENCOUNTER
Reason for call:   [x] Refill   [] Prior Auth  [] Other:     Office:   [] PCP/Provider -   [x] Specialty/Provider - Gastro    Medication: Esomeprazole 40 mg, take 1 capsule by mouth daily before breakfast         Pharmacy: [facility] [place]    Does the patient have enough for 3 days?   [x] Yes   [] No - Send as HP to POD

## 2024-08-08 NOTE — TELEPHONE ENCOUNTER
Reason for call: pt upset and wants a 90 day refill NOT a 30 day and wants refill resent for 90  [x] Refill   [] Prior Auth  [] Other:     Office:   [] PCP/Provider -   [x] Specialty/Provider - GI    Medication:         Pharmacy: [facility] [place]    Does the patient have enough for 3 days?   [] Yes   [x] No - Send as HP to POD

## 2024-08-09 ENCOUNTER — OFFICE VISIT (OUTPATIENT)
Dept: FAMILY MEDICINE CLINIC | Facility: CLINIC | Age: 69
End: 2024-08-09
Payer: COMMERCIAL

## 2024-08-09 VITALS
WEIGHT: 148 LBS | TEMPERATURE: 98 F | SYSTOLIC BLOOD PRESSURE: 102 MMHG | BODY MASS INDEX: 29.06 KG/M2 | HEART RATE: 70 BPM | DIASTOLIC BLOOD PRESSURE: 70 MMHG | OXYGEN SATURATION: 98 % | HEIGHT: 60 IN | RESPIRATION RATE: 12 BRPM

## 2024-08-09 DIAGNOSIS — R73.03 PRE-DIABETES: ICD-10-CM

## 2024-08-09 DIAGNOSIS — Z13.29 SCREENING FOR THYROID DISORDER: ICD-10-CM

## 2024-08-09 DIAGNOSIS — H81.13 BENIGN PAROXYSMAL POSITIONAL VERTIGO DUE TO BILATERAL VESTIBULAR DISORDER: ICD-10-CM

## 2024-08-09 DIAGNOSIS — E78.5 HYPERLIPIDEMIA, UNSPECIFIED HYPERLIPIDEMIA TYPE: Primary | ICD-10-CM

## 2024-08-09 PROCEDURE — G2211 COMPLEX E/M VISIT ADD ON: HCPCS | Performed by: FAMILY MEDICINE

## 2024-08-09 PROCEDURE — 99214 OFFICE O/P EST MOD 30 MIN: CPT | Performed by: FAMILY MEDICINE

## 2024-08-09 RX ORDER — ESOMEPRAZOLE MAGNESIUM 40 MG/1
40 CAPSULE, DELAYED RELEASE ORAL
Qty: 90 CAPSULE | Refills: 0 | Status: SHIPPED | OUTPATIENT
Start: 2024-08-09

## 2024-08-09 RX ORDER — MECLIZINE HCL 12.5 MG/1
12.5 TABLET ORAL 3 TIMES DAILY PRN
Qty: 30 TABLET | Refills: 0 | Status: SHIPPED | OUTPATIENT
Start: 2024-08-09

## 2024-08-09 NOTE — PROGRESS NOTES
Stacey Jacobo 1955 female MRN: [id number]    [facility] PRACTICE OFFICE VISIT  [facility] - [facility]      ASSESSMENT/PLAN  Stacye Jacobo is a 69 y.o. female presents to the office for    Diagnoses and all orders for this visit:    Hyperlipidemia, unspecified hyperlipidemia type  -     Comprehensive metabolic panel; Future  -     Lipid panel; Future    Screening for thyroid disorder  -     TSH, 3rd generation with Free T4 reflex; Future    Pre-diabetes  -     CBC and differential; Future  -     Comprehensive metabolic panel; Future  -     Hemoglobin A1C; Future    Benign paroxysmal positional vertigo due to bilateral vestibular disorder  -     Ambulatory Referral to Physical Therapy; Future  -     meclizine (ANTIVERT) 12.5 MG tablet; Take 1 tablet (12.5 mg total) by mouth 3 (three) times a day as needed for dizziness       At this time the vertigo is very mild recommend trying medical management if there is no improvement highly recommend that the patient be seen by vestibular therapy.  Blood pressure currently is very well-controlled.  Patient continues to be very tearful and shows signs of depression but does not want any medical attention to this today.         Future Appointments   Date Time Provider Department Center   9/5/2024  1:30 PM Marilu Stack MD [facility]   9/19/2024  1:00 PM Arun Alvarado MD GI TR  Practice-Med   10/8/2024  9:30 AM [address]          SUBJECTIVE  CC: Hypertension (Elevated bp )      HPI:  Stacey Jacobo is a 69 y.o. female who presents for an acute appointment.  Patient has had elevated blood pressure on her Apple watch.  She states that she has been feeling a little dizzy as well.  Patient has not had a formal blood pressure check in the office.  Patient unfortunately still continues to be going through stress given her son and grandchildren situation.  Patient states that she has not gone for  all her blood work given that she was worried about getting the HIV tested last year.  She states that she would like to get it done prior to seeing me for her physical.  Patient just graduated for her physical therapy in the left arm  Review of Systems   Constitutional:  Negative for activity change, appetite change, chills, fatigue and fever.   HENT:  Negative for congestion.    Respiratory:  Negative for cough, chest tightness and shortness of breath.    Cardiovascular:  Negative for chest pain and leg swelling.   Gastrointestinal:  Negative for abdominal distention, abdominal pain, constipation, diarrhea, nausea and vomiting.   Neurological:  Positive for dizziness.   All other systems reviewed and are negative.      Historical Information   The patient history was reviewed as follows:  Past Medical History:   Diagnosis Date   • Anxiety    • Anxiety disorder 01/27/2009    last assessed 1/27/09   • Chronic constipation 04/22/2004    last assessed 4/22/04; resolved 2/6/23   • Depression, recurrent (HCC) 10/15/2015   • Gait disturbance 07/12/2012    last assessed 7/12/12   • GERD (gastroesophageal reflux disease) 03/23/2007    last assessed 3/23/07   • History of vertigo     feels well today; has had crystal therapy in past.  2/6/23   • Hyperlipidemia    • Primary localized osteoarthritis of hip 10/27/2011    last assessed 10/27/11   • Sleep apnea    • 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    last assessed 11/14/07         Medications:     Current Outpatient Medications:   •  esomeprazole (NexIUM) 40 MG capsule, Take 1 capsule (40 mg total) by mouth daily before breakfast, Disp: 30 capsule, Rfl: 1  •  meclizine (ANTIVERT) 12.5 MG tablet, Take 1 tablet (12.5 mg total) by mouth 3 (three) times a day as needed for dizziness, Disp: 30 tablet, Rfl: 0  •  methylPREDNISolone 4 MG tablet therapy pack, Use as directed on package, Disp: 21 each, Rfl: 0  •  cholecalciferol (VITAMIN D3) 1,000 units tablet, Take 1,000 Units by mouth  daily (Patient not taking: Reported on 1/16/2023), Disp: , Rfl:   •  LYSINE PO, Take by mouth (Patient not taking: Reported on 1/16/2023), Disp: , Rfl:     Allergies   Allergen Reactions   • Ciprofloxacin Vomiting     Reaction Date: 25Mar2010;        OBJECTIVE  Vitals:   Vitals:    08/09/24 1217   BP: 102/70   BP Location: Left arm   Patient Position: Sitting   Cuff Size: Standard   Pulse: 70   Resp: 12   Temp: 98 °F (36.7 °C)   TempSrc: Temporal   SpO2: 98%   Weight: 67.1 kg (148 lb)   Height: 5' (1.524 m)         Physical Exam  Vitals reviewed.   Constitutional:       Appearance: She is well-developed.   HENT:      Head: Normocephalic and atraumatic.   Eyes:      Extraocular Movements: EOM normal.      Conjunctiva/sclera: Conjunctivae normal.      Pupils: Pupils are equal, round, and reactive to light.   Cardiovascular:      Rate and Rhythm: Normal rate and regular rhythm.      Heart sounds: Normal heart sounds, S1 normal and S2 normal. No murmur heard.  Pulmonary:      Effort: Pulmonary effort is normal. No respiratory distress.      Breath sounds: Normal breath sounds. No wheezing.   Musculoskeletal:         General: No edema. Normal range of motion.      Cervical back: Normal range of motion and neck supple.   Skin:     General: Skin is warm.   Neurological:      Mental Status: She is alert and oriented to person, place, and time.   Psychiatric:         Mood and Affect: Mood and affect normal.         Speech: Speech normal.         Behavior: Behavior normal.         Thought Content: Thought content normal.         Judgment: Judgment normal.                    Marilu Barrera MD,   [facility]  8/9/2024

## 2024-08-14 ENCOUNTER — APPOINTMENT (OUTPATIENT)
Dept: LAB | Facility: CLINIC | Age: 69
End: 2024-08-14
Payer: COMMERCIAL

## 2024-08-14 DIAGNOSIS — Z13.29 SCREENING FOR THYROID DISORDER: ICD-10-CM

## 2024-08-14 DIAGNOSIS — R73.03 PRE-DIABETES: ICD-10-CM

## 2024-08-14 DIAGNOSIS — E78.5 HYPERLIPIDEMIA, UNSPECIFIED HYPERLIPIDEMIA TYPE: ICD-10-CM

## 2024-08-14 LAB
ALBUMIN SERPL BCG-MCNC: 4.1 G/DL (ref 3.5–5)
ALP SERPL-CCNC: 79 U/L (ref 34–104)
ALT SERPL W P-5'-P-CCNC: 16 U/L (ref 7–52)
ANION GAP SERPL CALCULATED.3IONS-SCNC: 8 MMOL/L (ref 4–13)
AST SERPL W P-5'-P-CCNC: 19 U/L (ref 13–39)
BASOPHILS # BLD AUTO: 0.03 THOUSANDS/ÂΜL (ref 0–0.1)
BASOPHILS NFR BLD AUTO: 1 % (ref 0–1)
BILIRUB SERPL-MCNC: 0.49 MG/DL (ref 0.2–1)
BUN SERPL-MCNC: 14 MG/DL (ref 5–25)
CALCIUM SERPL-MCNC: 9 MG/DL (ref 8.4–10.2)
CHLORIDE SERPL-SCNC: 106 MMOL/L (ref 96–108)
CHOLEST SERPL-MCNC: 196 MG/DL
CO2 SERPL-SCNC: 26 MMOL/L (ref 21–32)
CREAT SERPL-MCNC: 0.57 MG/DL (ref 0.6–1.3)
EOSINOPHIL # BLD AUTO: 0.26 THOUSAND/ÂΜL (ref 0–0.61)
EOSINOPHIL NFR BLD AUTO: 4 % (ref 0–6)
ERYTHROCYTE [DISTWIDTH] IN BLOOD BY AUTOMATED COUNT: 13.3 % (ref 11.6–15.1)
EST. AVERAGE GLUCOSE BLD GHB EST-MCNC: 137 MG/DL
GFR SERPL CREATININE-BSD FRML MDRD: 96 ML/MIN/1.73SQ M
GLUCOSE P FAST SERPL-MCNC: 96 MG/DL (ref 65–99)
HBA1C MFR BLD: 6.4 %
HCT VFR BLD AUTO: 38.8 % (ref 34.8–46.1)
HDLC SERPL-MCNC: 47 MG/DL
HGB BLD-MCNC: 12.7 G/DL (ref 11.5–15.4)
IMM GRANULOCYTES # BLD AUTO: 0.03 THOUSAND/UL (ref 0–0.2)
IMM GRANULOCYTES NFR BLD AUTO: 1 % (ref 0–2)
LDLC SERPL CALC-MCNC: 120 MG/DL (ref 0–100)
LYMPHOCYTES # BLD AUTO: 2.24 THOUSANDS/ÂΜL (ref 0.6–4.47)
LYMPHOCYTES NFR BLD AUTO: 35 % (ref 14–44)
MCH RBC QN AUTO: 28.9 PG (ref 26.8–34.3)
MCHC RBC AUTO-ENTMCNC: 32.7 G/DL (ref 31.4–37.4)
MCV RBC AUTO: 88 FL (ref 82–98)
MONOCYTES # BLD AUTO: 0.42 THOUSAND/ÂΜL (ref 0.17–1.22)
MONOCYTES NFR BLD AUTO: 7 % (ref 4–12)
NEUTROPHILS # BLD AUTO: 3.43 THOUSANDS/ÂΜL (ref 1.85–7.62)
NEUTS SEG NFR BLD AUTO: 52 % (ref 43–75)
NONHDLC SERPL-MCNC: 149 MG/DL
NRBC BLD AUTO-RTO: 0 /100 WBCS
PLATELET # BLD AUTO: 211 THOUSANDS/UL (ref 149–390)
PMV BLD AUTO: 9.7 FL (ref 8.9–12.7)
POTASSIUM SERPL-SCNC: 4.1 MMOL/L (ref 3.5–5.3)
PROT SERPL-MCNC: 7.1 G/DL (ref 6.4–8.4)
RBC # BLD AUTO: 4.4 MILLION/UL (ref 3.81–5.12)
SODIUM SERPL-SCNC: 140 MMOL/L (ref 135–147)
TRIGL SERPL-MCNC: 143 MG/DL
TSH SERPL DL<=0.05 MIU/L-ACNC: 1.96 UIU/ML (ref 0.45–4.5)
WBC # BLD AUTO: 6.41 THOUSAND/UL (ref 4.31–10.16)

## 2024-08-14 PROCEDURE — 80053 COMPREHEN METABOLIC PANEL: CPT

## 2024-08-14 PROCEDURE — 80061 LIPID PANEL: CPT

## 2024-08-14 PROCEDURE — 36415 COLL VENOUS BLD VENIPUNCTURE: CPT

## 2024-08-14 PROCEDURE — 84443 ASSAY THYROID STIM HORMONE: CPT

## 2024-08-14 PROCEDURE — 83036 HEMOGLOBIN GLYCOSYLATED A1C: CPT

## 2024-08-14 PROCEDURE — 85025 COMPLETE CBC W/AUTO DIFF WBC: CPT

## 2024-08-27 ENCOUNTER — RA CDI HCC (OUTPATIENT)
Dept: OTHER | Facility: HOSPITAL | Age: 69
End: 2024-08-27

## 2024-09-05 ENCOUNTER — OFFICE VISIT (OUTPATIENT)
Dept: FAMILY MEDICINE CLINIC | Facility: CLINIC | Age: 69
End: 2024-09-05
Payer: COMMERCIAL

## 2024-09-05 VITALS
TEMPERATURE: 98 F | SYSTOLIC BLOOD PRESSURE: 120 MMHG | OXYGEN SATURATION: 97 % | BODY MASS INDEX: 28.9 KG/M2 | DIASTOLIC BLOOD PRESSURE: 78 MMHG | HEIGHT: 60 IN | WEIGHT: 147.2 LBS | HEART RATE: 71 BPM | RESPIRATION RATE: 18 BRPM

## 2024-09-05 DIAGNOSIS — R73.03 PRE-DIABETES: ICD-10-CM

## 2024-09-05 DIAGNOSIS — Z78.0 POST-MENOPAUSE: ICD-10-CM

## 2024-09-05 DIAGNOSIS — Z00.00 MEDICARE ANNUAL WELLNESS VISIT, SUBSEQUENT: Primary | ICD-10-CM

## 2024-09-05 DIAGNOSIS — E78.5 HYPERLIPIDEMIA, UNSPECIFIED HYPERLIPIDEMIA TYPE: ICD-10-CM

## 2024-09-05 PROCEDURE — G0439 PPPS, SUBSEQ VISIT: HCPCS | Performed by: FAMILY MEDICINE

## 2024-09-05 NOTE — PATIENT INSTRUCTIONS
Medicare Preventive Visit Patient Instructions  Thank you for completing your Welcome to Medicare Visit or Medicare Annual Wellness Visit today. Your next wellness visit will be due in one year (9/6/2025).  The screening/preventive services that you may require over the next 5-10 years are detailed below. Some tests may not apply to you based off risk factors and/or age. Screening tests ordered at today's visit but not completed yet may show as past due. Also, please note that scanned in results may not display below.  Preventive Screenings:  Service Recommendations Previous Testing/Comments   Colorectal Cancer Screening  * Colonoscopy    * Fecal Occult Blood Test (FOBT)/Fecal Immunochemical Test (FIT)  * Fecal DNA/Cologuard Test  * Flexible Sigmoidoscopy Age: 45-75 years old   Colonoscopy: every 10 years (may be performed more frequently if at higher risk)  OR  FOBT/FIT: every 1 year  OR  Cologuard: every 3 years  OR  Sigmoidoscopy: every 5 years  Screening may be recommended earlier than age 45 if at higher risk for colorectal cancer. Also, an individualized decision between you and your healthcare provider will decide whether screening between the ages of 76-85 would be appropriate. Colonoscopy: 02/06/2023  FOBT/FIT: Not on file  Cologuard: Not on file  Sigmoidoscopy: Not on file    Screening Current     Breast Cancer Screening Age: 40+ years old  Frequency: every 1-2 years  Not required if history of left and right mastectomy Mammogram: 03/01/2021    Screening Current   Cervical Cancer Screening Between the ages of 21-29, pap smear recommended once every 3 years.   Between the ages of 30-65, can perform pap smear with HPV co-testing every 5 years.   Recommendations may differ for women with a history of total hysterectomy, cervical cancer, or abnormal pap smears in past. Pap Smear: 01/26/2021    Screening Not Indicated   Hepatitis C Screening Once for adults born between 1945 and 1965  More frequently in  patients at high risk for Hepatitis C Hep C Antibody: Not on file        Diabetes Screening 1-2 times per year if you're at risk for diabetes or have pre-diabetes Fasting glucose: 96 mg/dL (8/14/2024)  A1C: 6.4 % (8/14/2024)  Screening Current   Cholesterol Screening Once every 5 years if you don't have a lipid disorder. May order more often based on risk factors. Lipid panel: 08/14/2024    Screening Not Indicated  History Lipid Disorder     Other Preventive Screenings Covered by Medicare:  Abdominal Aortic Aneurysm (AAA) Screening: covered once if your at risk. You're considered to be at risk if you have a family history of AAA.  Lung Cancer Screening: covers low dose CT scan once per year if you meet all of the following conditions: (1) Age 55-77; (2) No signs or symptoms of lung cancer; (3) Current smoker or have quit smoking within the last 15 years; (4) You have a tobacco smoking history of at least 20 pack years (packs per day multiplied by number of years you smoked); (5) You get a written order from a healthcare provider.  Glaucoma Screening: covered annually if you're considered high risk: (1) You have diabetes OR (2) Family history of glaucoma OR (3)  aged 50 and older OR (4)  American aged 65 and older  Osteoporosis Screening: covered every 2 years if you meet one of the following conditions: (1) You're estrogen deficient and at risk for osteoporosis based off medical history and other findings; (2) Have a vertebral abnormality; (3) On glucocorticoid therapy for more than 3 months; (4) Have primary hyperparathyroidism; (5) On osteoporosis medications and need to assess response to drug therapy.   Last bone density test (DXA Scan): 03/01/2021.  HIV Screening: covered annually if you're between the age of 15-65. Also covered annually if you are younger than 15 and older than 65 with risk factors for HIV infection. For pregnant patients, it is covered up to 3 times per  pregnancy.    Immunizations:  Immunization Recommendations   Influenza Vaccine Annual influenza vaccination during flu season is recommended for all persons aged >= 6 months who do not have contraindications   Pneumococcal Vaccine   * Pneumococcal conjugate vaccine = PCV13 (Prevnar 13), PCV15 (Vaxneuvance), PCV20 (Prevnar 20)  * Pneumococcal polysaccharide vaccine = PPSV23 (Pneumovax) Adults 19-63 yo with certain risk factors or if 65+ yo  If never received any pneumonia vaccine: recommend Prevnar 20 (PCV20)  Give PCV20 if previously received 1 dose of PCV13 or PPSV23   Hepatitis B Vaccine 3 dose series if at intermediate or high risk (ex: diabetes, end stage renal disease, liver disease)   Respiratory syncytial virus (RSV) Vaccine - COVERED BY MEDICARE PART D  * RSVPreF3 (Arexvy) CDC recommends that adults 60 years of age and older may receive a single dose of RSV vaccine using shared clinical decision-making (SCDM)   Tetanus (Td) Vaccine - COST NOT COVERED BY MEDICARE PART B Following completion of primary series, a booster dose should be given every 10 years to maintain immunity against tetanus. Td may also be given as tetanus wound prophylaxis.   Tdap Vaccine - COST NOT COVERED BY MEDICARE PART B Recommended at least once for all adults. For pregnant patients, recommended with each pregnancy.   Shingles Vaccine (Shingrix) - COST NOT COVERED BY MEDICARE PART B  2 shot series recommended in those 19 years and older who have or will have weakened immune systems or those 50 years and older     Health Maintenance Due:      Topic Date Due   • Hepatitis C Screening  Never done   • Breast Cancer Screening: Mammogram  03/01/2022   • Colorectal Cancer Screening  02/04/2030   • Cervical Cancer Screening  Discontinued     Immunizations Due:      Topic Date Due   • Pneumococcal Vaccine: 65+ Years (1 of 1 - PCV) Never done   • COVID-19 Vaccine (1 - 2023-24 season) Never done   • Influenza Vaccine (1) 09/01/2024     Advance  Directives   What are advance directives?  Advance directives are legal documents that state your wishes and plans for medical care. These plans are made ahead of time in case you lose your ability to make decisions for yourself. Advance directives can apply to any medical decision, such as the treatments you want, and if you want to donate organs.   What are the types of advance directives?  There are many types of advance directives, and each state has rules about how to use them. You may choose a combination of any of the following:  Living will:  This is a written record of the treatment you want. You can also choose which treatments you do not want, which to limit, and which to stop at a certain time. This includes surgery, medicine, IV fluid, and tube feedings.   Durable power of  for healthcare (DPAHC):  This is a written record that states who you want to make healthcare choices for you when you are unable to make them for yourself. This person, called a proxy, is usually a family member or a friend. You may choose more than 1 proxy.  Do not resuscitate (DNR) order:  A DNR order is used in case your heart stops beating or you stop breathing. It is a request not to have certain forms of treatment, such as CPR. A DNR order may be included in other types of advance directives.  Medical directive:  This covers the care that you want if you are in a coma, near death, or unable to make decisions for yourself. You can list the treatments you want for each condition. Treatment may include pain medicine, surgery, blood transfusions, dialysis, IV or tube feedings, and a ventilator (breathing machine).  Values history:  This document has questions about your views, beliefs, and how you feel and think about life. This information can help others choose the care that you would choose.  Why are advance directives important?  An advance directive helps you control your care. Although spoken wishes may be used, it  is better to have your wishes written down. Spoken wishes can be misunderstood, or not followed. Treatments may be given even if you do not want them. An advance directive may make it easier for your family to make difficult choices about your care.   Weight Management   Why it is important to manage your weight:  Being overweight increases your risk of health conditions such as heart disease, high blood pressure, type 2 diabetes, and certain types of cancer. It can also increase your risk for osteoarthritis, sleep apnea, and other respiratory problems. Aim for a slow, steady weight loss. Even a small amount of weight loss can lower your risk of health problems.  How to lose weight safely:  A safe and healthy way to lose weight is to eat fewer calories and get regular exercise. You can lose up about 1 pound a week by decreasing the number of calories you eat by 500 calories each day.   Healthy meal plan for weight management:  A healthy meal plan includes a variety of foods, contains fewer calories, and helps you stay healthy. A healthy meal plan includes the following:  Eat whole-grain foods more often.  A healthy meal plan should contain fiber. Fiber is the part of grains, fruits, and vegetables that is not broken down by your body. Whole-grain foods are healthy and provide extra fiber in your diet. Some examples of whole-grain foods are whole-wheat breads and pastas, oatmeal, brown rice, and bulgur.  Eat a variety of vegetables every day.  Include dark, leafy greens such as spinach, kale, uday greens, and mustard greens. Eat yellow and orange vegetables such as carrots, sweet potatoes, and winter squash.   Eat a variety of fruits every day.  Choose fresh or canned fruit (canned in its own juice or light syrup) instead of juice. Fruit juice has very little or no fiber.  Eat low-fat dairy foods.  Drink fat-free (skim) milk or 1% milk. Eat fat-free yogurt and low-fat cottage cheese. Try low-fat cheeses such as  mozzarella and other reduced-fat cheeses.  Choose meat and other protein foods that are low in fat.  Choose beans or other legumes such as split peas or lentils. Choose fish, skinless poultry (chicken or turkey), or lean cuts of red meat (beef or pork). Before you cook meat or poultry, cut off any visible fat.   Use less fat and oil.  Try baking foods instead of frying them. Add less fat, such as margarine, sour cream, regular salad dressing and mayonnaise to foods. Eat fewer high-fat foods. Some examples of high-fat foods include french fries, doughnuts, ice cream, and cakes.  Eat fewer sweets.  Limit foods and drinks that are high in sugar. This includes candy, cookies, regular soda, and sweetened drinks.  Exercise:  Exercise at least 30 minutes per day on most days of the week. Some examples of exercise include walking, biking, dancing, and swimming. You can also fit in more physical activity by taking the stairs instead of the elevator or parking farther away from stores. Ask your healthcare provider about the best exercise plan for you.      © Copyright [employer] 2018 Information is for End User's use only and may not be sold, redistributed or otherwise used for commercial purposes. All illustrations and images included in CareNotes® are the copyrighted property of [employer] or [employer]

## 2024-09-05 NOTE — PROGRESS NOTES
Ambulatory Visit  Name: Stacey Jacobo      : 1955      MRN: [id number]  Encounter Provider: Marilu Barrera MD  Encounter Date: 2024   Encounter department: [facility]    Assessment & Plan   1. Medicare annual wellness visit, subsequent  2. Pre-diabetes  -     Hemoglobin A1C; Future  3. Hyperlipidemia, unspecified hyperlipidemia type  -     Lipid panel; Future  4. Post-menopause  -     DXA bone density spine hip and pelvis; Future; Expected date: 2024           History of Present Illness     HPI   69-year-old female presenting to the office for follow-up on blood work as well as Medicare wellness visit.  Overall patient states that she has been doing very well.  Knows that she is due for possible bone density.  Patient states that she admits that her cholesterol is likely elevated from lifestyle.  States that she would like to trial diet and lifestyle modifications prior to starting an agent if possible  Patient Care Team:  Marilu Stack MD as PCP - General (Family Medicine)  XAVIER Waters    Review of Systems   Constitutional:  Positive for fatigue. Negative for activity change, appetite change, chills and fever.   HENT:  Negative for congestion.    Respiratory:  Negative for cough, chest tightness and shortness of breath.    Cardiovascular:  Negative for chest pain and leg swelling.   Gastrointestinal:  Negative for abdominal distention, abdominal pain, constipation, diarrhea, nausea and vomiting.   All other systems reviewed and are negative.    Medical History Reviewed by provider this encounter:       Annual Wellness Visit Questionnaire   Stacey is here for her Subsequent Wellness visit.     Health Risk Assessment:   Patient rates overall health as very good. Patient feels that their physical health rating is much better. Patient is very satisfied with their life. Eyesight was rated as same. Hearing was rated as same. Patient feels that their emotional  and mental health rating is same. Patients states they are never, rarely angry. Patient states they are never, rarely unusually tired/fatigued. Pain experienced in the last 7 days has been none. Patient states that she has experienced weight loss or gain in last 6 months.     Depression Screening:   PHQ-2 Score: 0      Fall Risk Screening:   In the past year, patient has experienced: no history of falling in past year      Urinary Incontinence Screening:   Patient has not leaked urine accidently in the last six months.     Home Safety:  Patient does not have trouble with stairs inside or outside of their home. Patient has working smoke alarms and has working carbon monoxide detector. Home safety hazards include: none.     Nutrition:   Current diet is Regular.     Medications:   Patient is not currently taking any over-the-counter supplements. Patient is able to manage medications.     Activities of Daily Living (ADLs)/Instrumental Activities of Daily Living (IADLs):   Walk and transfer into and out of bed and chair?: Yes  Dress and groom yourself?: Yes    Bathe or shower yourself?: Yes    Feed yourself? Yes  Do your laundry/housekeeping?: Yes  Manage your money, pay your bills and track your expenses?: Yes  Make your own meals?: Yes    Do your own shopping?: Yes    Previous Hospitalizations:   Any hospitalizations or ED visits within the last 12 months?: No      Advance Care Planning:   Living will: No    Durable POA for healthcare: No    Advanced directive: No    End of Life Decisions reviewed with patient: Yes    Provider agrees with end of life decisions: Yes      Comments:  is POA      Cognitive Screening:   Provider or family/friend/caregiver concerned regarding cognition?: No    PREVENTIVE SCREENINGS      Cardiovascular Screening:    General: Screening Not Indicated, History Lipid Disorder and Screening Current      Diabetes Screening:     General: Screening Current      Colorectal Cancer Screening:      General: Screening Current      Breast Cancer Screening:     General: Screening Current      Cervical Cancer Screening:    General: Screening Not Indicated      Osteoporosis Screening:    General: Screening Current      Abdominal Aortic Aneurysm (AAA) Screening:        General: Risks and Benefits Discussed      Lung Cancer Screening:     General: Screening Not Indicated and Risks and Benefits Discussed    Screening, Brief Intervention, and Referral to Treatment (SBIRT)    Screening  Typical number of drinks in a day: 0  Typical number of drinks in a week: 1  Interpretation: Low risk drinking behavior.    Social Determinants of Health     Financial Resource Strain: Low Risk  (1/16/2023)    Overall Financial Resource Strain (CARDIA)    • Difficulty of Paying Living Expenses: Not hard at all   Food Insecurity: No Food Insecurity (9/5/2024)    Hunger Vital Sign    • Worried About Running Out of Food in the Last Year: Never true    • Ran Out of Food in the Last Year: Never true   Transportation Needs: No Transportation Needs (9/5/2024)    PRAPARE - Transportation    • Lack of Transportation (Medical): No    • Lack of Transportation (Non-Medical): No   Housing Stability: Low Risk  (9/5/2024)    Housing Stability Vital Sign    • Unable to Pay for Housing in the Last Year: No    • Number of Times Moved in the Last Year: 0    • Homeless in the Last Year: No   Utilities: Not At Risk (9/5/2024)    [facility] Utilities    • Threatened with loss of utilities: No     No results found.    Objective     /78 (BP Location: Left arm, Patient Position: Sitting, Cuff Size: Standard)   Pulse 71   Temp 98 °F (36.7 °C) (Temporal)   Resp 18   Ht 5' (1.524 m)   Wt 66.8 kg (147 lb 3.2 oz)   SpO2 97%   BMI 28.75 kg/m²     Physical Exam  Vitals reviewed.   Constitutional:       Appearance: Normal appearance. She is well-developed.   HENT:      Head: Normocephalic and atraumatic.      Right Ear: Tympanic membrane, ear canal and external  ear normal. There is no impacted cerumen.      Left Ear: Tympanic membrane, ear canal and external ear normal. There is no impacted cerumen.      Nose: Nose normal.      Mouth/Throat:      Mouth: Mucous membranes are moist.      Pharynx: Oropharynx is clear.   Eyes:      Conjunctiva/sclera: Conjunctivae normal.      Pupils: Pupils are equal, round, and reactive to light.   Cardiovascular:      Rate and Rhythm: Normal rate and regular rhythm.      Heart sounds: Normal heart sounds.   Pulmonary:      Effort: Pulmonary effort is normal.      Breath sounds: Normal breath sounds.   Abdominal:      General: Abdomen is flat. Bowel sounds are normal.      Palpations: Abdomen is soft.   Musculoskeletal:         General: Normal range of motion.      Cervical back: Normal range of motion and neck supple.   Skin:     General: Skin is warm.      Capillary Refill: Capillary refill takes less than 2 seconds.   Neurological:      General: No focal deficit present.      Mental Status: She is alert and oriented to person, place, and time. Mental status is at baseline.   Psychiatric:         Mood and Affect: Mood normal.         Behavior: Behavior normal.         Thought Content: Thought content normal.         Judgment: Judgment normal.

## 2024-09-19 ENCOUNTER — OFFICE VISIT (OUTPATIENT)
Dept: GASTROENTEROLOGY | Facility: CLINIC | Age: 69
End: 2024-09-19
Payer: MEDICARE

## 2024-09-19 VITALS
BODY MASS INDEX: 28.39 KG/M2 | SYSTOLIC BLOOD PRESSURE: 116 MMHG | HEART RATE: 67 BPM | DIASTOLIC BLOOD PRESSURE: 72 MMHG | WEIGHT: 144.6 LBS | HEIGHT: 60 IN

## 2024-09-19 DIAGNOSIS — R10.13 DYSPEPSIA: ICD-10-CM

## 2024-09-19 DIAGNOSIS — K21.9 GASTROESOPHAGEAL REFLUX DISEASE WITHOUT ESOPHAGITIS: Primary | ICD-10-CM

## 2024-09-19 DIAGNOSIS — K22.70 BARRETT'S ESOPHAGUS WITHOUT DYSPLASIA: ICD-10-CM

## 2024-09-19 DIAGNOSIS — R10.13 EPIGASTRIC PAIN: ICD-10-CM

## 2024-09-19 DIAGNOSIS — K57.90 DIVERTICULAR DISEASE: ICD-10-CM

## 2024-09-19 PROCEDURE — 99214 OFFICE O/P EST MOD 30 MIN: CPT | Performed by: INTERNAL MEDICINE

## 2024-09-19 RX ORDER — ESOMEPRAZOLE MAGNESIUM 40 MG/1
40 CAPSULE, DELAYED RELEASE ORAL
Qty: 90 CAPSULE | Refills: 5 | Status: SHIPPED | OUTPATIENT
Start: 2024-09-19

## 2024-09-19 NOTE — PROGRESS NOTES
[facility] - Outpatient Follow-up Note  Stacey Jacobo 69 y.o. female MRN: [id number]  Encounter: [id number]          ASSESSMENT AND PLAN:      1. Gastroesophageal reflux disease without esophagitis  -     esomeprazole (NexIUM) 40 MG capsule; Take 1 capsule (40 mg total) by mouth daily before breakfast  2. Xiong's esophagus without dysplasia  -     esomeprazole (NexIUM) 40 MG capsule; Take 1 capsule (40 mg total) by mouth daily before breakfast  3. Epigastric pain  -     esomeprazole (NexIUM) 40 MG capsule; Take 1 capsule (40 mg total) by mouth daily before breakfast  4. Diverticular disease  5. Dyspepsia    History of GERD dyspepsia, mild discomfort upper abdomen, possible Xiong's esophagus, the prior EGD did not support the Xiong's diagnosis.  She has small hiatal hernia gastritis, antireflux measures reinforced, eat smaller portion avoid late-night meals, she is somewhat better when she follows this diet.  Side effects of long-term use of acid reducer medication reviewed.    Personal history of polyp distal diverticulosis, and good for 7 years, bowel regimen reviewed, may take fiber supplement, and they will follow-up in the office as needed.  ______________________________________________________________________    SUBJECTIVE:      Patient came in for evaluation and follow-up of her history of heartburn acid reflux indigestion, last EGD in February 2023 had some mild gastritis and a small hiatal hernia, she is managing with antireflux measures, denies dysphagia nausea vomiting hematemesis melena hematochezia, appetite is fair, bowels sometimes irregular but no apparent blood.  Trying to consume more fluid and fiber in the diet.  Prior EGD colonoscopy biopsy results noted, was recommended 5 to 7-year follow-up.  She will call us as needed.  May take fiber supplement to manage the bowels better.      REVIEW OF SYSTEMS IS OTHERWISE NEGATIVE.      Historical Information   Past  Medical History:   Diagnosis Date    Anxiety     Anxiety disorder 2009    last assessed 09    Chronic constipation 2004    last assessed 04; resolved 23    Depression, recurrent (HCC) 10/15/2015    Gait disturbance 2012    last assessed 12    GERD (gastroesophageal reflux disease) 2007    last assessed 3/23/07    History of vertigo     feels well today; has had crystal therapy in past.  23    Hyperlipidemia     Primary localized osteoarthritis of hip 10/27/2011    last assessed 10/27/11    Sleep apnea     Thoracic neuritis 2007    last assessed 07     Past Surgical History:   Procedure Laterality Date     SECTION      CHOLECYSTECTOMY      COLONOSCOPY      DENTAL SURGERY      implants    UPPER GASTROINTESTINAL ENDOSCOPY       Social History   Social History     Substance and Sexual Activity   Alcohol Use Yes    Comment: occ wine / occasional      Social History     Substance and Sexual Activity   Drug Use No     Social History     Tobacco Use   Smoking Status Former    Current packs/day: 0.00    Average packs/day: 1 pack/day for 15.0 years (15.0 ttl pk-yrs)    Types: Cigarettes    Start date: 1971    Quit date: 1986    Years since quittin.5   Smokeless Tobacco Never     Family History   Problem Relation Age of Onset    No Known Problems Mother     Coronary artery disease Father     Colon cancer Sister     No Known Problems Sister     No Known Problems Sister     No Known Problems Sister     No Known Problems Maternal Grandmother     No Known Problems Paternal Grandmother     Esophageal cancer Paternal Aunt        Meds/Allergies       Current Outpatient Medications:     esomeprazole (NexIUM) 40 MG capsule    cholecalciferol (VITAMIN D3) 1,000 units tablet    LYSINE PO    meclizine (ANTIVERT) 12.5 MG tablet    methylPREDNISolone 4 MG tablet therapy pack    Allergies   Allergen Reactions    Ciprofloxacin Vomiting     Reaction Date:  25Mar2010;            Objective     Blood pressure 116/72, pulse 67, height 5' (1.524 m), weight 65.6 kg (144 lb 9.6 oz). Body mass index is 28.24 kg/m².      PHYSICAL EXAM:      General Appearance:   Alert, cooperative, no distress   HEENT:   Normocephalic, atraumatic, anicteric.     Neck:  Supple, symmetrical, trachea midline   Lungs:   Clear to auscultation bilaterally; no rales, rhonchi or wheezing; respirations unlabored    Heart::   Regular rate and rhythm; no murmur.   Abdomen:   Soft, non-tender, non-distended; normal bowel sounds; no masses, no organomegaly    Genitalia:   Deferred    Rectal:   Deferred    Extremities:  No cyanosis, clubbing or edema    Skin:  No jaundice, rashes, or lesions    Lymph nodes:  No palpable cervical lymphadenopathy        Lab Results:   No visits with results within 1 Day(s) from this visit.   Latest known visit with results is:   Appointment on 08/14/2024   Component Date Value    WBC 08/14/2024 6.41     RBC 08/14/2024 4.40     Hemoglobin 08/14/2024 12.7     Hematocrit 08/14/2024 38.8     MCV 08/14/2024 88     MCH 08/14/2024 28.9     MCHC 08/14/2024 32.7     RDW 08/14/2024 13.3     MPV 08/14/2024 9.7     Platelets 08/14/2024 211     nRBC 08/14/2024 0     Segmented % 08/14/2024 52     Immature Grans % 08/14/2024 1     Lymphocytes % 08/14/2024 35     Monocytes % 08/14/2024 7     Eosinophils Relative 08/14/2024 4     Basophils Relative 08/14/2024 1     Absolute Neutrophils 08/14/2024 3.43     Absolute Immature Grans 08/14/2024 0.03     Absolute Lymphocytes 08/14/2024 2.24     Absolute Monocytes 08/14/2024 0.42     Eosinophils Absolute 08/14/2024 0.26     Basophils Absolute 08/14/2024 0.03     Sodium 08/14/2024 140     Potassium 08/14/2024 4.1     Chloride 08/14/2024 106     CO2 08/14/2024 26     ANION GAP 08/14/2024 8     BUN 08/14/2024 14     Creatinine 08/14/2024 0.57 (L)     Glucose, Fasting 08/14/2024 96     Calcium 08/14/2024 9.0     AST 08/14/2024 19     ALT 08/14/2024 16      Alkaline Phosphatase 08/14/2024 79     Total Protein 08/14/2024 7.1     Albumin 08/14/2024 4.1     Total Bilirubin 08/14/2024 0.49     eGFR 08/14/2024 96     Cholesterol 08/14/2024 196     Triglycerides 08/14/2024 143     HDL, Direct 08/14/2024 47 (L)     LDL Calculated 08/14/2024 120 (H)     Non-HDL-Chol (CHOL-HDL) 08/14/2024 149     TSH 3RD GENERATON 08/14/2024 1.961     Hemoglobin A1C 08/14/2024 6.4 (H)     EAG 08/14/2024 137          Radiology Results:   No results found.

## 2024-10-08 ENCOUNTER — HOSPITAL ENCOUNTER (OUTPATIENT)
Dept: RADIOLOGY | Facility: HOSPITAL | Age: 69
Discharge: HOME | End: 2024-10-08
Attending: FAMILY MEDICINE
Payer: COMMERCIAL

## 2024-10-08 VITALS — WEIGHT: 146 LBS | BODY MASS INDEX: 28.66 KG/M2 | HEIGHT: 60 IN

## 2024-10-08 DIAGNOSIS — Z12.31 ENCOUNTER FOR SCREENING MAMMOGRAM FOR MALIGNANT NEOPLASM OF BREAST: ICD-10-CM

## 2024-10-08 PROCEDURE — 77063 BREAST TOMOSYNTHESIS BI: CPT

## 2024-10-08 PROCEDURE — 77067 SCR MAMMO BI INCL CAD: CPT

## 2024-10-10 ENCOUNTER — TELEPHONE (OUTPATIENT)
Dept: FAMILY MEDICINE CLINIC | Facility: CLINIC | Age: 69
End: 2024-10-10

## 2024-10-10 NOTE — TELEPHONE ENCOUNTER
----- Message from Marilu Stack MD sent at 10/10/2024 12:24 PM EDT -----  No mammographic evidence of malignancy; Repeat in 1 year

## 2024-10-30 ENCOUNTER — OFFICE VISIT (OUTPATIENT)
Dept: FAMILY MEDICINE CLINIC | Facility: CLINIC | Age: 69
End: 2024-10-30
Payer: COMMERCIAL

## 2024-10-30 VITALS
BODY MASS INDEX: 28.66 KG/M2 | HEART RATE: 62 BPM | TEMPERATURE: 98.1 F | SYSTOLIC BLOOD PRESSURE: 116 MMHG | OXYGEN SATURATION: 96 % | WEIGHT: 146 LBS | HEIGHT: 60 IN | DIASTOLIC BLOOD PRESSURE: 70 MMHG | RESPIRATION RATE: 12 BRPM

## 2024-10-30 DIAGNOSIS — R05.1 ACUTE COUGH: Primary | ICD-10-CM

## 2024-10-30 PROCEDURE — 99213 OFFICE O/P EST LOW 20 MIN: CPT | Performed by: FAMILY MEDICINE

## 2024-10-30 PROCEDURE — G2211 COMPLEX E/M VISIT ADD ON: HCPCS | Performed by: FAMILY MEDICINE

## 2024-10-30 RX ORDER — AZITHROMYCIN 250 MG/1
TABLET, FILM COATED ORAL
Qty: 6 TABLET | Refills: 0 | Status: SHIPPED | OUTPATIENT
Start: 2024-10-30 | End: 2024-11-03

## 2024-10-30 RX ORDER — DEXTROMETHORPHAN HYDROBROMIDE AND PROMETHAZINE HYDROCHLORIDE 15; 6.25 MG/5ML; MG/5ML
5 SYRUP ORAL 4 TIMES DAILY PRN
Qty: 118 ML | Refills: 0 | Status: SHIPPED | OUTPATIENT
Start: 2024-10-30

## 2024-10-30 NOTE — PROGRESS NOTES
Chief Complaint   Patient presents with    chest congestion     Patient c/o chest congestion x 1 week, otc meds mucinex not improving sxs  Stomach pain at times        Patient ID: Stacey Jacobo is a 69 y.o. female.    Cough  The current episode started 1 to 4 weeks ago. The problem has been waxing and waning. The problem occurs every few hours. The cough is Non-productive. Associated symptoms include nasal congestion and postnasal drip. Pertinent negatives include no chest pain, chills, ear congestion, ear pain, fever, headaches, heartburn, hemoptysis, myalgias, rash, rhinorrhea, sore throat, shortness of breath, sweats, weight loss or wheezing. The symptoms are aggravated by lying down. She has tried OTC cough suppressant for the symptoms. The treatment provided mild relief. There is no history of asthma, bronchitis, COPD, emphysema or environmental allergies.         The following portions of the patient's history were reviewed and updated as appropriate: allergies, current medications, past family history, past medical history, past social history, past surgical history and problem list.    Review of Systems   Constitutional:  Positive for appetite change. Negative for chills, fever and weight loss.   HENT:  Positive for postnasal drip. Negative for ear pain, rhinorrhea and sore throat.    Respiratory:  Positive for cough. Negative for hemoptysis, shortness of breath and wheezing.    Cardiovascular:  Negative for chest pain.   Gastrointestinal:  Positive for abdominal pain (discomfort since having URI). Negative for heartburn.   Musculoskeletal:  Negative for myalgias.   Skin:  Negative for rash.   Allergic/Immunologic: Negative for environmental allergies.   Neurological:  Negative for headaches.       Current Outpatient Medications   Medication Sig Dispense Refill    esomeprazole (NexIUM) 40 MG capsule Take 1 capsule (40 mg total) by mouth daily before breakfast 90 capsule 5    cholecalciferol (VITAMIN D3)  1,000 units tablet Take 1,000 Units by mouth daily (Patient not taking: Reported on 1/16/2023)      LYSINE PO Take by mouth (Patient not taking: Reported on 1/16/2023)      meclizine (ANTIVERT) 12.5 MG tablet Take 1 tablet (12.5 mg total) by mouth 3 (three) times a day as needed for dizziness (Patient not taking: Reported on 9/5/2024) 30 tablet 0     No current facility-administered medications for this visit.       Objective:    /70 (BP Location: Left arm, Patient Position: Sitting, Cuff Size: Large)   Pulse 62   Temp 98.1 °F (36.7 °C) (Temporal)   Resp 12   Ht 5' (1.524 m)   Wt 66.2 kg (146 lb)   SpO2 96%   BMI 28.51 kg/m²        Physical Exam  Constitutional:       General: She is not in acute distress.     Appearance: She is not ill-appearing.   HENT:      Nose: No congestion or rhinorrhea.      Mouth/Throat:      Pharynx: No oropharyngeal exudate or posterior oropharyngeal erythema.   Cardiovascular:      Rate and Rhythm: Normal rate and regular rhythm.   Pulmonary:      Effort: Pulmonary effort is normal. No respiratory distress.      Breath sounds: No wheezing, rhonchi or rales.   Abdominal:      Palpations: Abdomen is soft.      Tenderness: There is no abdominal tenderness.   Neurological:      Mental Status: She is alert.                 Assessment/Plan:         Diagnoses and all orders for this visit:    Acute cough  -     promethazine-dextromethorphan (PHENERGAN-DM) 6.25-15 mg/5 mL oral syrup; Take 5 mL by mouth 4 (four) times a day as needed for cough  -     azithromycin (ZITHROMAX) 250 mg tablet; Take 2 tablets today then 1 tablet daily x 4 days          Rto prn                 Carito Killian MD

## 2025-05-30 ENCOUNTER — APPOINTMENT (OUTPATIENT)
Dept: RADIOLOGY | Facility: CLINIC | Age: 70
End: 2025-05-30
Attending: FAMILY MEDICINE
Payer: COMMERCIAL

## 2025-05-30 ENCOUNTER — OFFICE VISIT (OUTPATIENT)
Dept: FAMILY MEDICINE CLINIC | Facility: CLINIC | Age: 70
End: 2025-05-30
Payer: COMMERCIAL

## 2025-05-30 VITALS
HEART RATE: 78 BPM | OXYGEN SATURATION: 96 % | TEMPERATURE: 97.6 F | BODY MASS INDEX: 30.04 KG/M2 | SYSTOLIC BLOOD PRESSURE: 110 MMHG | WEIGHT: 153 LBS | HEIGHT: 60 IN | DIASTOLIC BLOOD PRESSURE: 72 MMHG | RESPIRATION RATE: 16 BRPM

## 2025-05-30 DIAGNOSIS — M71.22 BAKER CYST, LEFT: ICD-10-CM

## 2025-05-30 DIAGNOSIS — M25.562 CHRONIC PAIN OF LEFT KNEE: ICD-10-CM

## 2025-05-30 DIAGNOSIS — M77.8 THUMB TENDONITIS: ICD-10-CM

## 2025-05-30 DIAGNOSIS — G89.29 CHRONIC PAIN OF LEFT KNEE: ICD-10-CM

## 2025-05-30 DIAGNOSIS — M25.561 ACUTE PAIN OF RIGHT KNEE: ICD-10-CM

## 2025-05-30 DIAGNOSIS — M77.8 THUMB TENDONITIS: Primary | ICD-10-CM

## 2025-05-30 PROCEDURE — 99214 OFFICE O/P EST MOD 30 MIN: CPT | Performed by: FAMILY MEDICINE

## 2025-05-30 PROCEDURE — 73130 X-RAY EXAM OF HAND: CPT

## 2025-05-30 PROCEDURE — G2211 COMPLEX E/M VISIT ADD ON: HCPCS | Performed by: FAMILY MEDICINE

## 2025-05-30 PROCEDURE — 73564 X-RAY EXAM KNEE 4 OR MORE: CPT

## 2025-05-30 NOTE — PROGRESS NOTES
Name: Stacey Jacobo      : 1955      MRN: [id number]  Encounter Provider: Marilu Stack MD  Encounter Date: 2025   Encounter department: [facility]    Assessment & Plan  Thumb tendonitis   thumb tendinitis  Recommend an x-ray to be obtained likely the degenerative disease but want to be sure that there is no other etiology  Orders:  •  Ambulatory Referral to Orthopedic Surgery; Future  •  XR hand 3+ vw left; Future  •  Ambulatory Referral to Physical Therapy; Future    Baker cyst, left  Expressed that patient that there is likely a meniscal tear or arthritic changes that is causing the Baker's cyst to worsen.  Currently she is symptomatic on exam today.  Recommend an orthopedic referral and physical therapy  Orders:  •  Ambulatory Referral to Orthopedic Surgery; Future  •  XR knee 4+ vw left injury; Future  •  XR knee 4+ vw right injury; Future  •  Ambulatory Referral to Physical Therapy; Future    Chronic pain of left knee    Orders:  •  Ambulatory Referral to Orthopedic Surgery; Future  •  XR knee 4+ vw left injury; Future  •  XR knee 4+ vw right injury; Future  •  Ambulatory Referral to Physical Therapy; Future    Acute pain of right knee    Orders:  •  Ambulatory Referral to Orthopedic Surgery; Future  •  XR knee 4+ vw right injury; Future  •  Ambulatory Referral to Physical Therapy; Future         History of Present Illness     HPI  70-year-old female presenting to the office states that she tripped over the curb while walking her dog.  Patient has had thumb tendinitis since then.  As well as acute on chronic left knee pain and acute pain of the right knee.  Patient states that the knee on the left was observed by an orthopedic in the past and an MRI was obtained.  Patient states that she might need another referral again.  Review of Systems   Musculoskeletal:  Positive for arthralgias.     Past Medical History[1]  Past Surgical History[2]  Family  History[3]  Social History[4]  Medications[5]  Allergies   Allergen Reactions   • Ciprofloxacin Vomiting     Reaction Date: 2010;      Immunization History   Administered Date(s) Administered   • Hep B, adult 2012, 01/15/2013, 2013   • Influenza, seasonal, injectable 1955, 2012, 10/22/2013   • Tdap 2013, 06/15/2020     Objective   /72 (BP Location: Right arm, Patient Position: Sitting, Cuff Size: Large)   Pulse 78   Temp 97.6 °F (36.4 °C) (Tympanic)   Resp 16   Ht 5' (1.524 m)   Wt 69.4 kg (153 lb)   SpO2 96%   BMI 29.88 kg/m²     Physical Exam  Constitutional:       Appearance: She is well-developed.   HENT:      Head: Normocephalic and atraumatic.   Pulmonary:      Effort: Pulmonary effort is normal.     Musculoskeletal:      Right knee: Tenderness present over the patellar tendon.      Left knee: Effusion present. Decreased range of motion. Tenderness present.        Legs:      Neurological:      Mental Status: She is alert and oriented to person, place, and time.     Psychiatric:         Behavior: Behavior normal.         Thought Content: Thought content normal.         Judgment: Judgment normal.                [1]  Past Medical History:  Diagnosis Date   • Anxiety    • Anxiety disorder 2009    last assessed 09   • Chronic constipation 2004    last assessed 04; resolved 23   • Depression, recurrent (HCC) 10/15/2015   • Gait disturbance 2012    last assessed 12   • GERD (gastroesophageal reflux disease) 2007    last assessed 3/23/07   • History of vertigo     feels well today; has had crystal therapy in past.  23   • Hyperlipidemia    • Primary localized osteoarthritis of hip 10/27/2011    last assessed 10/27/11   • Sleep apnea    • Thoracic neuritis 2007    last assessed 07   [2]  Past Surgical History:  Procedure Laterality Date   •  SECTION     • CHOLECYSTECTOMY     • COLONOSCOPY     • DENTAL  SURGERY      implants   • UPPER GASTROINTESTINAL ENDOSCOPY     [3]  Family History  Problem Relation Name Age of Onset   • No Known Problems Mother     • Coronary artery disease Father     • Colon cancer Sister Alejandra    • No Known Problems Sister     • No Known Problems Sister     • No Known Problems Sister     • No Known Problems Maternal Grandmother     • No Known Problems Paternal Grandmother     • Esophageal cancer Paternal Aunt     [4]  Social History  Tobacco Use   • Smoking status: Former     Current packs/day: 0.00     Average packs/day: 1 pack/day for 15.0 years (15.0 ttl pk-yrs)     Types: Cigarettes     Start date: 1971     Quit date: 1986     Years since quittin.2   • Smokeless tobacco: Never   Vaping Use   • Vaping status: Never Used   Substance and Sexual Activity   • Alcohol use: Yes     Comment: occ wine / occasional    • Drug use: No   [5]  Current Outpatient Medications on File Prior to Visit   Medication Sig   • esomeprazole (NexIUM) 40 MG capsule Take 1 capsule (40 mg total) by mouth daily before breakfast   • LYSINE PO Take by mouth (Patient not taking: Reported on 2023)   • meclizine (ANTIVERT) 12.5 MG tablet Take 1 tablet (12.5 mg total) by mouth 3 (three) times a day as needed for dizziness (Patient not taking: Reported on 2024)   • promethazine-dextromethorphan (PHENERGAN-DM) 6.25-15 mg/5 mL oral syrup Take 5 mL by mouth 4 (four) times a day as needed for cough (Patient not taking: Reported on 2025)

## 2025-06-05 ENCOUNTER — RESULTS FOLLOW-UP (OUTPATIENT)
Dept: FAMILY MEDICINE CLINIC | Facility: CLINIC | Age: 70
End: 2025-06-05

## 2025-06-09 ENCOUNTER — OFFICE VISIT (OUTPATIENT)
Dept: OBGYN CLINIC | Facility: CLINIC | Age: 70
End: 2025-06-09
Attending: FAMILY MEDICINE
Payer: COMMERCIAL

## 2025-06-09 VITALS — BODY MASS INDEX: 30.23 KG/M2 | HEIGHT: 60 IN | WEIGHT: 154 LBS

## 2025-06-09 DIAGNOSIS — M17.0 PRIMARY OSTEOARTHRITIS OF BOTH KNEES: ICD-10-CM

## 2025-06-09 DIAGNOSIS — S83.242D OTHER TEAR OF MEDIAL MENISCUS OF LEFT KNEE AS CURRENT INJURY, SUBSEQUENT ENCOUNTER: Primary | ICD-10-CM

## 2025-06-09 PROCEDURE — 99213 OFFICE O/P EST LOW 20 MIN: CPT | Performed by: ORTHOPAEDIC SURGERY

## 2025-06-09 PROCEDURE — 20610 DRAIN/INJ JOINT/BURSA W/O US: CPT | Performed by: ORTHOPAEDIC SURGERY

## 2025-06-09 RX ORDER — ROPIVACAINE HYDROCHLORIDE 2 MG/ML
4 INJECTION, SOLUTION EPIDURAL; INFILTRATION; PERINEURAL
Status: COMPLETED | OUTPATIENT
Start: 2025-06-09 | End: 2025-06-09

## 2025-06-09 RX ORDER — TRIAMCINOLONE ACETONIDE 40 MG/ML
40 INJECTION, SUSPENSION INTRA-ARTICULAR; INTRAMUSCULAR
Status: COMPLETED | OUTPATIENT
Start: 2025-06-09 | End: 2025-06-09

## 2025-06-09 RX ADMIN — ROPIVACAINE HYDROCHLORIDE 4 ML: 2 INJECTION, SOLUTION EPIDURAL; INFILTRATION; PERINEURAL at 14:15

## 2025-06-09 RX ADMIN — TRIAMCINOLONE ACETONIDE 40 MG: 40 INJECTION, SUSPENSION INTRA-ARTICULAR; INTRAMUSCULAR at 14:15

## 2025-06-09 NOTE — PROGRESS NOTES
Patient Name: Stacey Jacobo      : 1955       MRN: [id number]   Encounter Provider: Gentry Lechuga MD   Encounter Date: 25  Encounter department: [facility]         Assessment & Plan  Primary osteoarthritis of both knees    Orders:    Ambulatory Referral to Physical Therapy; Future       Stacey is a pleasant 70-year-old female presents today for follow-up evaluation of her bilateral knee pain.  Upon examination and x-ray review her signs and symptoms are consistent with bilateral primary osteoarthritis of her knees.  I did review treatment options for this such as cortisone shot injection, formal physical therapy, activity modification.  In the future she may be a candidate for arthroplasty but at this time her arthritic changes are mild and I do not think she needs this at this time.  She elected for and received a cortisone injection which is well-tolerated no complications, postinjection instructions were provided.  I did refer her to formal physical therapy this was ordered today.  I explained to her that she can follow-up for the right knee to receive a cortisone shot injection if she experiences more pain, she must wait 3 months to receive a repeat cortisone shot injection of the left knee.  She was amenable to this.  She will follow-up on an as-needed basis for possible cortisone shot injections.  However questions concerns were addressed today.    _____________________________________________________  CHIEF COMPLAINT:  Chief Complaint   Patient presents with    Right Knee - Pain    Left Knee - Follow-up     Left knee hurts more than right knee.         SUBJECTIVE:  Stacey Jacobo is a 70 y.o. female who presents presents for follow-up evaluation of her left knee pain.  Patient states she has been experiencing a return of her left knee pain over the last 6 months.  She has been more active with her grandchildren and recently has been having  "difficulty walking more than 3 miles and riding a bike.  She does complain of pain with pivoting on her left knee and will notice a popping however her knee does not get stuck.  She notices her pain will bother her more at night when she is sleeping.  She does use OTC medication for pain control with minimal relief.  She did complete formal physical therapy in 2023 which did provide relief however she is not participated in physical therapy since then.  She does complain of right knee pain as well however this will bother her at night when she is trying to sleep she is unsure if she has been overly compensating with her right knee.  She denies any recent injury trauma or distal paresthesias.         Surgical History:  None    PAST MEDICAL HISTORY:  Past Medical History[1]    PAST SURGICAL HISTORY:  Past Surgical History[2]    FAMILY HISTORY:  Family History[3]    SOCIAL HISTORY:  Social History[4]    MEDICATIONS:  Current Medications[5]    ALLERGIES:  Allergies[6]    LABS:  HgA1c:   Lab Results   Component Value Date    HGBA1C 6.4 (H) 08/14/2024     BMP:   Lab Results   Component Value Date    CALCIUM 9.0 08/14/2024    K 4.1 08/14/2024    CO2 26 08/14/2024     08/14/2024    BUN 14 08/14/2024    CREATININE 0.57 (L) 08/14/2024     CBC: No components found for: \"CBC\"    _____________________________________________________  Review of systems: ROS is negative other than that noted in the HPI.  Constitutional: Negative for fatigue and fever.   HENT: Negative for sore throat.    Respiratory: Negative for shortness of breath.    Cardiovascular: Negative for chest pain.   Gastrointestinal: Negative for abdominal pain.   Endocrine: Negative for cold intolerance and heat intolerance.   Genitourinary: Negative for flank pain.   Musculoskeletal: Negative for back pain.   Skin: Negative for rash.   Allergic/Immunologic: Negative for immunocompromised state.   Neurological: Negative for dizziness.   Psychiatric/Behavioral: " "Negative for agitation.     Knee Exam:   No significant skin lesions or deformity  Palpable bakers cyst   Range of motion from 0 to 120  Lateral joint line tenderness   Knee is stable to varus stress, valgus stress, Lachman, and posterior drawer.    Patella tracks centrally with palpable crepitus  Calf compartments are soft and supple  (-) Charles's sign  2+ DP and PT pulses with brisk capillary refill to the toes  Sural, saphenous, tibial, superficial, and deep peroneal motor and sensory distributions intact  Sensation light touch intact distally      Physical exam:  General/Constitutional: NAD, well developed, well nourished  HENT: Normocephalic, atraumatic  CV: Intact distal pulses, regular rate  Resp: No respiratory distress or labored breathing  Abdomen: soft, nondistended   Lymphatic: No lymphadenopathy palpated  Neuro: Alert and Oriented x 3, no focal deficits  Psych: Normal mood, normal affect  Skin: Warm, dry, no rashes, no erythema  _____________________________________________________  STUDIES REVIEWED:  X-rays of the bilateral knees reviewed and interpreted today. These show mild to moderate osteoarthritis, no lytic or blastic lesions.      PROCEDURES PERFORMED:   Large joint arthrocentesis: L knee    Performed by: Gentry Lechuga MD  Authorized by: Gentry Lechuga MD    Universal Protocol:  procedure performed by consultantConsent: Verbal consent obtained  Risks and benefits: risks, benefits and alternatives were discussed  Consent given by: patient  Time out: Immediately prior to procedure a \"time out\" was called to verify the correct patient, procedure, equipment, support staff and site/side marked as required.  Timeout called at: 6/9/2025 2:29 PM.  Patient understanding: patient states understanding of the procedure being performed  Patient consent: the patient's understanding of the procedure matches consent given  Site marked: the operative site was marked  Patient identity " confirmed: verbally with patient  Supporting Documentation  Indications: pain     Is this a Visco injection? NoProcedure Details  Location: knee - L knee  Preparation: Patient was prepped and draped in the usual sterile fashion  Needle size: 22 G  Ultrasound guidance: no  Approach: anterolateral  Medications administered: 40 mg triamcinolone acetonide 40 mg/mL; 4 mL ropivacaine 0.2 %    Patient tolerance: patient tolerated the procedure well with no immediate complications  Dressing:  Sterile dressing applied           Scribe Attestation      I,:  Jeison Abernathy am acting as a scribe while in the presence of the attending physician.:       I,:  Gnetry Lechuga MD personally performed the services described in this documentation    as scribed in my presence.:                   [1]   Past Medical History:  Diagnosis Date    Anxiety     Anxiety disorder 2009    last assessed 09    Chronic constipation 2004    last assessed 04; resolved 23    Depression, recurrent (HCC) 10/15/2015    Gait disturbance 2012    last assessed 12    GERD (gastroesophageal reflux disease) 2007    last assessed 3/23/07    History of vertigo     feels well today; has had crystal therapy in past.  23    Hyperlipidemia     Primary localized osteoarthritis of hip 10/27/2011    last assessed 10/27/11    Sleep apnea     Thoracic neuritis 2007    last assessed 07   [2]   Past Surgical History:  Procedure Laterality Date     SECTION      CHOLECYSTECTOMY      COLONOSCOPY      DENTAL SURGERY      implants    UPPER GASTROINTESTINAL ENDOSCOPY     [3]   Family History  Problem Relation Name Age of Onset    No Known Problems Mother      Coronary artery disease Father      Colon cancer Sister Alejandra     No Known Problems Sister      No Known Problems Sister      No Known Problems Sister      No Known Problems Maternal Grandmother      No Known Problems Paternal Grandmother      Esophageal  cancer Paternal Aunt     [4]   Social History  Tobacco Use    Smoking status: Former     Current packs/day: 0.00     Average packs/day: 1 pack/day for 15.0 years (15.0 ttl pk-yrs)     Types: Cigarettes     Start date: 1971     Quit date: 1986     Years since quittin.3    Smokeless tobacco: Never   Vaping Use    Vaping status: Never Used   Substance Use Topics    Alcohol use: Yes     Comment: occ wine / occasional     Drug use: No   [5]   Current Outpatient Medications:     esomeprazole (NexIUM) 40 MG capsule, Take 1 capsule (40 mg total) by mouth daily before breakfast, Disp: 90 capsule, Rfl: 5    LYSINE PO, Take by mouth (Patient not taking: Reported on 2023), Disp: , Rfl:     meclizine (ANTIVERT) 12.5 MG tablet, Take 1 tablet (12.5 mg total) by mouth 3 (three) times a day as needed for dizziness (Patient not taking: Reported on 2024), Disp: 30 tablet, Rfl: 0    promethazine-dextromethorphan (PHENERGAN-DM) 6.25-15 mg/5 mL oral syrup, Take 5 mL by mouth 4 (four) times a day as needed for cough (Patient not taking: Reported on 2025), Disp: 118 mL, Rfl: 0  [6]   Allergies  Allergen Reactions    Ciprofloxacin Vomiting     Reaction Date: 2010;

## 2025-06-13 ENCOUNTER — OFFICE VISIT (OUTPATIENT)
Dept: OBGYN CLINIC | Facility: CLINIC | Age: 70
End: 2025-06-13
Payer: COMMERCIAL

## 2025-06-13 VITALS — BODY MASS INDEX: 30.23 KG/M2 | WEIGHT: 154 LBS | HEIGHT: 60 IN

## 2025-06-13 DIAGNOSIS — M19.032 LOCALIZED PRIMARY OSTEOARTHRITIS OF CARPOMETACARPAL (CMC) JOINT OF LEFT WRIST: Primary | ICD-10-CM

## 2025-06-13 PROCEDURE — 99213 OFFICE O/P EST LOW 20 MIN: CPT | Performed by: STUDENT IN AN ORGANIZED HEALTH CARE EDUCATION/TRAINING PROGRAM

## 2025-06-13 NOTE — PROGRESS NOTES
ORTHOPAEDIC HAND, WRIST, AND ELBOW OFFICE  VISIT     Name: Stacey Jacobo      : 1955      MRN: [id number]  Encounter Provider: Elijah Duke MD  Encounter Date: 2025   Encounter department: [facility]  :  Assessment & Plan  Localized primary osteoarthritis of carpometacarpal (CMC) joint of left wrist    Orders:    Ambulatory Referral to PT/OT Hand Therapy; Future             ASSESSMENT/PLAN:    Stacey Jacobo is a 70 y.o. RHD female who presents with left thumb CMC osteoarthritis    Etiology and treatment options discussed, all her questions were addressed.  Referral to OT provided for therapy and custom splinting. Discussed use of splint 24-7 for 6 weeks, can be removed for hygiene.   Voltaren topically, OTC analgesics as needed.         Follow Up:  6 weeks if symptoms have not improved, discussed possible CSI. Otherwise follow up 3 months.      General Discussions:    CMC Arthritis: The anatomy and physiology of carpometacarpal joint arthritis was discussed with the patient today in the office.  Deterioration of the articular cartilage eventually leads to hypermobility at the thumb CMC joint, resulting in joint subluxation, osteophyte formation, cystic changes within the trapezium and base of the first metacarpal, as well as subchondral sclerosis.  Eventually, pain, limited mobility, and compensatory hyperextension at the metacarpophalangeal joint may develop.  While normal activity and usage of the thumb joint may provide a painful experience to the patient, this typically does not result in damage to the thumb or hand.  Treatment options include resting thumb spica splints to decreased joint edema, pain, and inflammation.  Therapy exercises to strengthen the thenar musculature may relieve pain, but do not alter the overall continued development of osteoarthritis.  Oral medications, topical medications, dietary changes, and corticosteroid  injections may decrease pain and increase overall function.  Eventually, approximately 10-20% of patients may require surgical intervention.           ____________________________________________________________________________________________________________________________________________      CHIEF COMPLAINT:  Left thumb pain    SUBJECTIVE:  Stacey Jacobo is a 70 y.o. female who presents for initial evaluation of left thumb pain. Pain is localized to the CMC joint, aggravated with activities like gripping/twisting.   Radiation: None  Previous Treatments: Tylenol with only partial relief  Associated symptoms: Stiffness/LROM  Handedness: right  Work status: retired    I have personally reviewed all the relevant PMH, PSH, SH, FH, Medications and allergies      PAST MEDICAL HISTORY:  Past Medical History[1]    PAST SURGICAL HISTORY:  Past Surgical History[2]    FAMILY HISTORY:  Family History[3]    SOCIAL HISTORY:  Social History[4]    MEDICATIONS:  Current Medications[5]    ALLERGIES:  Allergies[6]      REVIEW OF SYSTEMS:  Pertinent items are noted in HPI.  A comprehensive review of systems was negative.    VITALS:  There were no vitals filed for this visit.    LABS:  HgA1c:   Lab Results   Component Value Date    HGBA1C 6.4 (H) 08/14/2024     BMP:   Lab Results   Component Value Date    CALCIUM 9.0 08/14/2024    K 4.1 08/14/2024    CO2 26 08/14/2024     08/14/2024    BUN 14 08/14/2024    CREATININE 0.57 (L) 08/14/2024       _____________________________________________________  PHYSICAL EXAMINATION:  General: well developed and well nourished, alert, oriented times 3, and appears comfortable  Psychiatric: Normal  HEENT: Normocephalic, Atraumatic Trachea Midline, No torticollis  Pulmonary: No audible wheezing or respiratory distress   Abdomen/GI: Non tender, non distended   Cardiovascular: Regular Rate and Rhythm. No pitting edema, 2+ radial pulse   Skin: No masses, erythema, lacerations, fluctation,  ulcerations  Neurovascular: Sensation Intact to the Median, Ulnar, Radial Nerve, Motor Intact to the Median, Ulnar, Radial Nerve, and Pulses Intact  Musculoskeletal: Normal, except as noted in detailed exam and in HPI.        FOCUSED MUSCULOSKELETAL EXAMINATION:    Left Upper Extremity  Inspection: Skin is warm and dry to touch with no signs of erythema, ecchymosis, or infection  Palpation: TTP CMC   Neurologic: 5/5 elbow flexion, 5/5 elbow extension, 5/5 wrist extension, 5/5 wrist flexion, 5/5 finger flexion, 5/5 finger extension, 5/5 FPL, 5/5 EPL, 5/5 APB, 5/5 intrinsics, sensation intact to median, radial, and ulnar nerve distributions  Vascular: Palpable radial pulse, brisk cap refill <2sec, hand warm and well perfused  MSK:     + CMC grind  5/5 APB  Full FDS, FDP, extensor mechanisms are intact  No rotational deformity with composite finger flexion      ___________________________________________________  STUDIES REVIEWED:  Xrays of the left hand were obtained on 5/30/25 were independently reviewed which demonstrates severe thumb CMC osteoarthritis.       LABS REVIEWED:    HgA1c:   Lab Results   Component Value Date    HGBA1C 6.4 (H) 08/14/2024     BMP:   Lab Results   Component Value Date    CALCIUM 9.0 08/14/2024    K 4.1 08/14/2024    CO2 26 08/14/2024     08/14/2024    BUN 14 08/14/2024    CREATININE 0.57 (L) 08/14/2024               PROCEDURES PERFORMED:  Procedures  No Procedures performed today    _____________________________________________________      Scribe Attestation      I,:  Chica Hernandez am acting as a scribe while in the presence of the attending physician.:       I,:  Elijah Duke MD personally performed the services described in this documentation    as scribed in my presence.:               I agree with the history, physical examination, assessment and plan of care as documented above.    Elijah Duke M.D.  Attending, Orthopaedic Surgery  Hand, Wrist, and Elbow  Surgery  [facility]           [1]   Past Medical History:  Diagnosis Date    Anxiety     Anxiety disorder 2009    last assessed 09    Chronic constipation 2004    last assessed 04; resolved 23    Depression, recurrent (HCC) 10/15/2015    Gait disturbance 2012    last assessed 12    GERD (gastroesophageal reflux disease) 2007    last assessed 3/23/07    History of vertigo     feels well today; has had crystal therapy in past.  23    Hyperlipidemia     Primary localized osteoarthritis of hip 10/27/2011    last assessed 10/27/11    Sleep apnea     Thoracic neuritis 2007    last assessed 07   [2]   Past Surgical History:  Procedure Laterality Date     SECTION      CHOLECYSTECTOMY      COLONOSCOPY      DENTAL SURGERY      implants    UPPER GASTROINTESTINAL ENDOSCOPY     [3]   Family History  Problem Relation Name Age of Onset    No Known Problems Mother      Coronary artery disease Father      Colon cancer Sister Alejandra     No Known Problems Sister      No Known Problems Sister      No Known Problems Sister      No Known Problems Maternal Grandmother      No Known Problems Paternal Grandmother      Esophageal cancer Paternal Aunt     [4]   Social History  Tobacco Use    Smoking status: Former     Current packs/day: 0.00     Average packs/day: 1 pack/day for 15.0 years (15.0 ttl pk-yrs)     Types: Cigarettes     Start date: 1971     Quit date: 1986     Years since quittin.3    Smokeless tobacco: Never   Vaping Use    Vaping status: Never Used   Substance Use Topics    Alcohol use: Yes     Comment: occ wine / occasional     Drug use: No   [5]   Current Outpatient Medications:     esomeprazole (NexIUM) 40 MG capsule, Take 1 capsule (40 mg total) by mouth daily before breakfast, Disp: 90 capsule, Rfl: 5    LYSINE PO, Take by mouth (Patient not taking: Reported on 2023), Disp: , Rfl:     meclizine (ANTIVERT) 12.5 MG  tablet, Take 1 tablet (12.5 mg total) by mouth 3 (three) times a day as needed for dizziness (Patient not taking: Reported on 9/5/2024), Disp: 30 tablet, Rfl: 0    promethazine-dextromethorphan (PHENERGAN-DM) 6.25-15 mg/5 mL oral syrup, Take 5 mL by mouth 4 (four) times a day as needed for cough (Patient not taking: Reported on 6/9/2025), Disp: 118 mL, Rfl: 0  [6]   Allergies  Allergen Reactions    Ciprofloxacin Vomiting     Reaction Date: 25Mar2010;